# Patient Record
Sex: FEMALE | Race: WHITE | NOT HISPANIC OR LATINO | Employment: FULL TIME | URBAN - METROPOLITAN AREA
[De-identification: names, ages, dates, MRNs, and addresses within clinical notes are randomized per-mention and may not be internally consistent; named-entity substitution may affect disease eponyms.]

---

## 2017-02-06 DIAGNOSIS — I42.0 CARDIOMYOPATHY, DILATED, NONISCHEMIC: ICD-10-CM

## 2017-02-06 DIAGNOSIS — I50.22 CHRONIC SYSTOLIC HEART FAILURE: ICD-10-CM

## 2017-02-06 RX ORDER — CARVEDILOL 6.25 MG/1
TABLET ORAL
Qty: 60 TABLET | Refills: 0 | Status: SHIPPED | OUTPATIENT
Start: 2017-02-06 | End: 2017-03-16 | Stop reason: SDUPTHER

## 2017-03-07 DIAGNOSIS — I42.0 CARDIOMYOPATHY, DILATED, NONISCHEMIC: ICD-10-CM

## 2017-03-07 DIAGNOSIS — I50.22 CHRONIC SYSTOLIC HEART FAILURE: ICD-10-CM

## 2017-03-07 RX ORDER — LISINOPRIL 5 MG/1
TABLET ORAL
Qty: 60 TABLET | Refills: 0 | Status: SHIPPED | OUTPATIENT
Start: 2017-03-07 | End: 2017-03-16 | Stop reason: SDUPTHER

## 2017-03-13 DIAGNOSIS — I50.22 CHRONIC SYSTOLIC HEART FAILURE: ICD-10-CM

## 2017-03-13 DIAGNOSIS — I42.0 CARDIOMYOPATHY, DILATED, NONISCHEMIC: ICD-10-CM

## 2017-03-13 RX ORDER — CARVEDILOL 6.25 MG/1
TABLET ORAL
Qty: 60 TABLET | Refills: 0 | OUTPATIENT
Start: 2017-03-13

## 2017-03-15 ENCOUNTER — TELEPHONE (OUTPATIENT)
Dept: CARDIOLOGY | Facility: CLINIC | Age: 52
End: 2017-03-15

## 2017-03-15 NOTE — TELEPHONE ENCOUNTER
----- Message from Jerzy Del Angel sent at 3/14/2017  4:39 PM CDT -----  Contact: Patient  Pt needs a return call regarding being worked into the schedule for an appt. Pt's states that she has this Thursday off and can come in. Pt can be reached @ ..[phone] (home)  Thank you/ [facility]

## 2017-03-16 ENCOUNTER — OFFICE VISIT (OUTPATIENT)
Dept: CARDIOLOGY | Facility: CLINIC | Age: 52
End: 2017-03-16
Payer: COMMERCIAL

## 2017-03-16 VITALS
HEIGHT: 64 IN | DIASTOLIC BLOOD PRESSURE: 70 MMHG | HEART RATE: 78 BPM | BODY MASS INDEX: 23.45 KG/M2 | SYSTOLIC BLOOD PRESSURE: 136 MMHG | WEIGHT: 137.38 LBS

## 2017-03-16 DIAGNOSIS — I50.22 CHRONIC SYSTOLIC HEART FAILURE: ICD-10-CM

## 2017-03-16 DIAGNOSIS — I50.9 CHF (NYHA CLASS III, ACC/AHA STAGE C): Primary | Chronic | ICD-10-CM

## 2017-03-16 DIAGNOSIS — F17.200 SMOKER: ICD-10-CM

## 2017-03-16 DIAGNOSIS — I42.0 CARDIOMYOPATHY, DILATED, NONISCHEMIC: ICD-10-CM

## 2017-03-16 PROCEDURE — 1160F RVW MEDS BY RX/DR IN RCRD: CPT | Mod: S$GLB,,, | Performed by: NURSE PRACTITIONER

## 2017-03-16 PROCEDURE — 99214 OFFICE O/P EST MOD 30 MIN: CPT | Mod: S$GLB,,, | Performed by: NURSE PRACTITIONER

## 2017-03-16 PROCEDURE — 93000 ELECTROCARDIOGRAM COMPLETE: CPT | Mod: S$GLB,,, | Performed by: INTERNAL MEDICINE

## 2017-03-16 PROCEDURE — 99999 PR PBB SHADOW E&M-EST. PATIENT-LVL III: CPT | Mod: PBBFAC,,, | Performed by: NURSE PRACTITIONER

## 2017-03-16 RX ORDER — CARVEDILOL 12.5 MG/1
12.5 TABLET ORAL 2 TIMES DAILY
Qty: 60 TABLET | Refills: 6 | Status: SHIPPED | OUTPATIENT
Start: 2017-03-16 | End: 2017-12-22 | Stop reason: SDUPTHER

## 2017-03-16 RX ORDER — MELOXICAM 15 MG/1
TABLET ORAL
COMMUNITY
Start: 2017-03-13 | End: 2017-06-06

## 2017-03-16 RX ORDER — FUROSEMIDE 20 MG/1
20 TABLET ORAL DAILY
Qty: 30 TABLET | Refills: 1 | Status: ON HOLD | OUTPATIENT
Start: 2017-03-16 | End: 2020-05-28 | Stop reason: HOSPADM

## 2017-03-16 RX ORDER — POTASSIUM CHLORIDE 20 MEQ/1
20 TABLET, EXTENDED RELEASE ORAL DAILY PRN
Qty: 30 TABLET | Refills: 1 | Status: SHIPPED | OUTPATIENT
Start: 2017-03-16 | End: 2020-05-29

## 2017-03-16 RX ORDER — LISINOPRIL 5 MG/1
TABLET ORAL
Qty: 60 TABLET | Refills: 6 | Status: SHIPPED | OUTPATIENT
Start: 2017-03-16 | End: 2018-02-01 | Stop reason: SDUPTHER

## 2017-03-16 NOTE — PROGRESS NOTES
Subjective:    Patient ID:  Melissa Petty is a 52 y.o. female who presents for follow-up of Congestive Heart Failure      HPI   Ms Petty is a 52 year old female with a PMHx of Systolic Heart Failure, nonischemic cardiomyopathy, and smoker. She visits the clinic today for follow up for CHF. Patient was last seen in cardiology clinic on 9/2/2015. She has been feeling well. Denies chest pain, shortness of breath, palpitations, syncope or dizziness. Denies orthopnea or PND. Vital signs stable today in clinic. 2 D Echo on 9/16/2015 showed low normal to mildly depressed left ventricular systolic function (EF 50-55%), regional wall motion abnormalities noted and left ventricular diastolic dysfunction. Improved from moderately depressed left ventricular function (EF 30%) on 6/1/2013. She reports compliance with low sodium diet and limits her fluid intake.     Review of Systems   Constitution: Negative for diaphoresis, weakness, malaise/fatigue, weight gain and weight loss.   HENT: Negative for congestion and nosebleeds.    Eyes: Negative for blurred vision and double vision.   Cardiovascular: Negative for chest pain, claudication, cyanosis, dyspnea on exertion, irregular heartbeat, leg swelling, near-syncope, orthopnea, palpitations, paroxysmal nocturnal dyspnea and syncope.   Respiratory: Negative for cough, hemoptysis, shortness of breath, sputum production and wheezing.    Hematologic/Lymphatic: Negative for bleeding problem. Does not bruise/bleed easily.   Skin: Negative for rash.   Musculoskeletal: Negative for back pain, falls, joint pain, joint swelling and neck pain.   Gastrointestinal: Negative for abdominal pain, heartburn and vomiting.   Genitourinary: Negative for dysuria, frequency and hematuria.   Neurological: Negative for difficulty with concentration, dizziness, focal weakness, light-headedness, numbness and seizures.   Psychiatric/Behavioral: Negative for depression, memory loss and substance abuse.    Allergic/Immunologic: Negative for HIV exposure and hives.               Past Medical History:   Diagnosis Date    Cardiomyopathy     CHF (congestive heart failure)     Hypertension      No past surgical history on file.  Family History   Problem Relation Age of Onset    Heart failure Mother     Hypertension Brother      Social History     Social History    Marital status: Single     Spouse name: N/A    Number of children: N/A    Years of education: N/A     Social History Main Topics    Smoking status: Current Every Day Smoker     Packs/day: 0.25     Years: 32.00    Smokeless tobacco: Never Used    Alcohol use No    Drug use: No    Sexual activity: Not on file     Other Topics Concern    Not on file     Social History Narrative     Review of patient's allergies indicates:   Allergen Reactions    Pcn [penicillins]      Unknown reaction       Current Outpatient Prescriptions on File Prior to Visit   Medication Sig Dispense Refill    aspirin (ECOTRIN) 81 MG EC tablet Take 81 mg by mouth once daily.      folic acid (FOLVITE) 800 MCG Tab Take 400 mcg by mouth once daily.      multivitamin capsule Take 1 capsule by mouth once daily.      thiamine (VITAMIN B-1) 100 MG tablet Take 250 mg by mouth once daily.      [DISCONTINUED] carvedilol (COREG) 6.25 MG tablet TAKE ONE TABLET BY MOUTH TWICE DAILY 60 tablet 0    [DISCONTINUED] lisinopril (PRINIVIL,ZESTRIL) 5 MG tablet TAKE ONE TABLET BY MOUTH EVERY 12 HOURS 60 tablet 0    [DISCONTINUED] furosemide (LASIX) 20 MG tablet Take 1 tablet (20 mg total) by mouth once daily. PRN 30 tablet 3    [DISCONTINUED] potassium chloride SA (K-DUR,KLOR-CON) 20 MEQ tablet PRN, takes with lasix      [DISCONTINUED] zolpidem (AMBIEN) 5 MG Tab        No current facility-administered medications on file prior to visit.      .  Objective:    Physical Exam   Constitutional: She is oriented to person, place, and time. She appears well-developed and well-nourished.   HENT:  "  Head: Normocephalic and atraumatic.   Eyes: Pupils are equal, round, and reactive to light.   Neck: Normal range of motion. No JVD present.   Cardiovascular: Exam reveals no gallop and no friction rub.    No murmur heard.  Pulmonary/Chest: Effort normal and breath sounds normal. No respiratory distress. She has no wheezes. She has no rales. She exhibits no tenderness.   Abdominal: Soft. Bowel sounds are normal. She exhibits no distension and no mass. There is no tenderness. There is no rebound and no guarding.   Musculoskeletal: She exhibits no edema or deformity.   Neurological: She is alert and oriented to person, place, and time.   Skin: Skin is warm and dry.   Psychiatric: She has a normal mood and affect. Her behavior is normal. Judgment and thought content normal.   Nursing note and vitals reviewed.           Chemistry        Component Value Date/Time     09/02/2015 1253    K 3.9 09/02/2015 1253     09/02/2015 1253    CO2 26 09/02/2015 1253    BUN 17 09/02/2015 1253    CREATININE 0.7 09/02/2015 1253    GLU 81 09/02/2015 1253        Component Value Date/Time    CALCIUM 9.5 09/02/2015 1253    ALKPHOS 80 06/24/2013 1503    AST 25 06/24/2013 1503    ALT 26 06/24/2013 1503    BILITOT 0.5 06/24/2013 1503          Lab Results   Component Value Date    TSH 0.332 (L) 05/31/2013     Lab Results   Component Value Date    INR 1.1 05/31/2013     Lab Results   Component Value Date    WBC 10.18 06/04/2013    HGB 16.9 (H) 06/04/2013    HCT 50.2 (H) 06/04/2013    MCV 99.6 (H) 06/04/2013     06/04/2013     /70 (BP Location: Right arm, Patient Position: Sitting, BP Method: Manual)  Pulse 78 Comment: radial  Ht 5' 4" (1.626 m)  Wt 62.3 kg (137 lb 5.6 oz)  BMI 23.58 kg/m2    Assessment:       1. CHF (NYHA class III, ACC/AHA stage C)    2. Chronic systolic heart failure    3. Cardiomyopathy, dilated, nonischemic    4. Smoker        Patient doing well, no signs of Decompensated Systolic Heart Failure " on exam. Vital signs and EKG stable.   She was instructed on the importance of maintaining a low sodium diet and limiting fluid intake.         Plan:       Will continue current medications and treatment plan  Risk modification   Low sodium diet of 1.5-2 grams daily and limit fluid intake to 1.5-2 liters daily  OMT-Increase Coreg 12.5 mg BID  BP log at home twice daily   Follow up in clinic in 3-4 months

## 2017-03-16 NOTE — MR AVS SNAPSHOT
[facility]  [address] [facility] [place] [address]  Phone: [phone]  Fax: [phone]                  Melissa Petty   3/16/2017 10:30 AM   Office Visit    Description:  Female : 1965   Provider:  Trevor Martinez NP   Department:  [facility]           Reason for Visit     Congestive Heart Failure           Diagnoses this Visit        Comments    CHF (NYHA class III, ACC/AHA stage C)    -  Primary     Chronic systolic heart failure         Cardiomyopathy, dilated, nonischemic         Smoker                To Do List           Goals (5 Years of Data)     None      Follow-Up and Disposition     Return in about 4 months (around 2017).       These Medications        Disp Refills Start End    carvedilol (COREG) 12.5 MG tablet 60 tablet 6 3/16/2017     Take 1 tablet (12.5 mg total) by mouth 2 (two) times daily. - Oral    Pharmacy: [facility] [address] [place] [address] Ph #: [phone]       lisinopril (PRINIVIL,ZESTRIL) 5 MG tablet 60 tablet 6 3/16/2017     TAKE ONE TABLET BY MOUTH EVERY 12 HOURS    Pharmacy: [facility] [address] [place] [address] Ph #: [phone]       furosemide (LASIX) 20 MG tablet 30 tablet 1 3/16/2017     Take 1 tablet (20 mg total) by mouth once daily. PRN - Oral    Pharmacy: [facility] [address] [place] [address] Ph #: [phone]       potassium chloride SA (K-DUR,KLOR-CON) 20 MEQ tablet 30 tablet 1 3/16/2017     Take 1 tablet (20 mEq total) by mouth daily as needed. PRN, takes with lasix - Oral    Pharmacy: [address] Ph #: [phone]         [facility] Call     [facility] On Call Nurse Care Line -  Assistance  Registered nurses in the [facility] provide clinical advisement, health education, appointment booking, and other advisory services.  Call for this free service  "at [phone].             Medications           CHANGE how you are taking these medications     Start Taking Instead of    carvedilol (COREG) 12.5 MG tablet carvedilol (COREG) 6.25 MG tablet    Dosage:  Take 1 tablet (12.5 mg total) by mouth 2 (two) times daily. Dosage:  TAKE ONE TABLET BY MOUTH TWICE DAILY    Reason for Change:  Reorder     potassium chloride SA (K-DUR,KLOR-CON) 20 MEQ tablet potassium chloride SA (K-DUR,KLOR-CON) 20 MEQ tablet    Dosage:  Take 1 tablet (20 mEq total) by mouth daily as needed. PRN, takes with lasix Dosage:  PRN, takes with lasix    Reason for Change:  Reorder       STOP taking these medications     zolpidem (AMBIEN) 5 MG Tab            Verify that the below list of medications is an accurate representation of the medications you are currently taking.  If none reported, the list may be blank. If incorrect, please contact your healthcare provider. Carry this list with you in case of emergency.           Current Medications     aspirin (ECOTRIN) 81 MG EC tablet Take 81 mg by mouth once daily.    carvedilol (COREG) 12.5 MG tablet Take 1 tablet (12.5 mg total) by mouth 2 (two) times daily.    folic acid (FOLVITE) 800 MCG Tab Take 400 mcg by mouth once daily.    lisinopril (PRINIVIL,ZESTRIL) 5 MG tablet TAKE ONE TABLET BY MOUTH EVERY 12 HOURS    meloxicam (MOBIC) 15 MG tablet     multivitamin capsule Take 1 capsule by mouth once daily.    thiamine (VITAMIN B-1) 100 MG tablet Take 250 mg by mouth once daily.    furosemide (LASIX) 20 MG tablet Take 1 tablet (20 mg total) by mouth once daily. PRN    potassium chloride SA (K-DUR,KLOR-CON) 20 MEQ tablet Take 1 tablet (20 mEq total) by mouth daily as needed. PRN, takes with lasix           Clinical Reference Information           Your Vitals Were     BP Pulse Height Weight BMI    136/70 (BP Location: Right arm, Patient Position: Sitting, BP Method: Manual) 78 5' 4" (1.626 m) 62.3 kg (137 lb 5.6 oz) 23.58 kg/m2      Blood Pressure          " Most Recent Value    BP  136/70      Allergies as of 3/16/2017     Pcn [Penicillins]      Immunizations Administered on Date of Encounter - 3/16/2017     None      Orders Placed During Today's Visit     Future Labs/Procedures Expected by Expires    SCHEDULED EKG 12-LEAD (to Muse)  As directed 3/16/2018      [facility] Sign-Up     Activating your [facility] account is as easy as 1-2-3!     1) Visit [email or web address], select Sign Up Now, enter this activation code and your date of birth, then select Next.  [email or web address]  Expires: 4/30/2017 11:01 AM      2) Create a username and password to use when you visit [facility] in the future and select a security question in case you lose your password and select Next.    3) Enter your e-mail address and click Sign Up!    Additional Information  If you have questions, please e-mail [email or web address] or call [phone] to talk to our [facility] staff. Remember, [facility] is NOT to be used for urgent needs. For medical emergencies, dial 911.         Smoking Cessation     If you would like to quit smoking:   You may be eligible for free services if you are a [place] resident and started smoking cigarettes before September 1, 1988.  Call the Smoking Cessation [employer] (SCT) toll free at (919) 023-7568 or (785) 435-4265.   Call 8-109-QUIT-NOW if you do not meet the above criteria.            Language Assistance Services     ATTENTION: Language assistance services are available, free of charge. Please call [phone].      ATENCIÓN: Si habla español, tiene a culp disposición servicios gratuitos de asistencia lingüística. Llame al [phone].     CHÚ Ý: N?u b?n nói Ti?ng Vi?t, có các d?ch v? h? tr? ngôn ng? mi?n phí dành cho b?n. G?i s? [phone].         [facility] complies with applicable Federal civil rights laws and does not discriminate on the basis of race, color, national origin, age, disability, or sex.

## 2017-06-06 ENCOUNTER — HOSPITAL ENCOUNTER (OUTPATIENT)
Dept: RADIOLOGY | Facility: HOSPITAL | Age: 52
Discharge: HOME | End: 2017-06-06
Attending: NURSE PRACTITIONER
Payer: COMMERCIAL

## 2017-06-06 ENCOUNTER — OFFICE VISIT (OUTPATIENT)
Dept: URGENT CARE | Facility: CLINIC | Age: 52
End: 2017-06-06
Payer: COMMERCIAL

## 2017-06-06 VITALS
OXYGEN SATURATION: 97 % | TEMPERATURE: 96 F | HEIGHT: 64 IN | WEIGHT: 131.94 LBS | DIASTOLIC BLOOD PRESSURE: 82 MMHG | BODY MASS INDEX: 22.53 KG/M2 | RESPIRATION RATE: 18 BRPM | HEART RATE: 84 BPM | SYSTOLIC BLOOD PRESSURE: 138 MMHG

## 2017-06-06 DIAGNOSIS — I10 ESSENTIAL HYPERTENSION: ICD-10-CM

## 2017-06-06 DIAGNOSIS — F17.200 TOBACCO USE DISORDER: Primary | ICD-10-CM

## 2017-06-06 DIAGNOSIS — M25.551 PAIN OF RIGHT HIP JOINT: ICD-10-CM

## 2017-06-06 PROCEDURE — 99203 OFFICE O/P NEW LOW 30 MIN: CPT | Mod: 25,S$GLB,, | Performed by: NURSE PRACTITIONER

## 2017-06-06 PROCEDURE — 99999 PR PBB SHADOW E&M-EST. PATIENT-LVL V: CPT | Mod: PBBFAC,,, | Performed by: NURSE PRACTITIONER

## 2017-06-06 PROCEDURE — 96372 THER/PROPH/DIAG INJ SC/IM: CPT | Mod: S$GLB,,, | Performed by: NURSE PRACTITIONER

## 2017-06-06 PROCEDURE — 73502 X-RAY EXAM HIP UNI 2-3 VIEWS: CPT | Mod: TC,PO,RT

## 2017-06-06 PROCEDURE — 73502 X-RAY EXAM HIP UNI 2-3 VIEWS: CPT | Mod: 26,RT,, | Performed by: RADIOLOGY

## 2017-06-06 RX ORDER — ASPIRIN 81 MG/1
81 TABLET ORAL
COMMUNITY
End: 2020-07-02

## 2017-06-06 RX ORDER — MELOXICAM 15 MG/1
15 TABLET ORAL
COMMUNITY
Start: 2017-04-06 | End: 2017-06-06

## 2017-06-06 RX ORDER — NAPROXEN 500 MG/1
500 TABLET ORAL
COMMUNITY
Start: 2017-05-16 | End: 2019-12-18

## 2017-06-06 RX ORDER — KETOROLAC TROMETHAMINE 30 MG/ML
60 INJECTION, SOLUTION INTRAMUSCULAR; INTRAVENOUS ONCE
Status: COMPLETED | OUTPATIENT
Start: 2017-06-06 | End: 2017-06-06

## 2017-06-06 RX ADMIN — KETOROLAC TROMETHAMINE 60 MG: 30 INJECTION, SOLUTION INTRAMUSCULAR; INTRAVENOUS at 04:06

## 2017-06-06 NOTE — PATIENT INSTRUCTIONS
PLAN: X-ray right hip  Consult Rheumatology  Toradol 60 mg im now  Advise increase p.o. fluids--water/juice & rest  Practice good handwashing.  Advise continue medications  Discussed smoking cessation  Tylenol or Ibuprofen for fever, headache and body aches.  Advise warm heat  Advise follow up with PCP  Advise  go to ER if symptoms worsen or fail to improve with treatment.

## 2017-06-06 NOTE — PROGRESS NOTES
CHIEF COMPLAINT/REASON FOR VISIT:  Right hip pain    HISTORY OF PRESENT ILLNESS:  52-year-old female complains of right hip pain on and off 1-2 years ago. Patient denies any specific injury or trauma.  Patient admits seen and treated with a steroid injection 2 months ago per orthopedic with little relief.  Patient requesting x-ray right hip.  Admits Given Naprosyn per orthopedics with little relief.  Patient admits still smoking.  Discuss smoking cessation.  Patient denies chest pain, shortness of breath, congestion, fever, cough, urinary discomfort.       Past Medical History:   Diagnosis Date    Cardiomyopathy     CHF (congestive heart failure)     Hypertension           Social History     Social History    Marital status: Single     Spouse name: N/A    Number of children: N/A    Years of education: N/A     Occupational History    Not on file.     Social History Main Topics    Smoking status: Current Every Day Smoker     Packs/day: 0.25     Years: 32.00    Smokeless tobacco: Never Used    Alcohol use No    Drug use: No    Sexual activity: Not on file     Other Topics Concern    Not on file     Social History Narrative    No narrative on file          Family History   Problem Relation Age of Onset    Heart failure Mother     Hypertension Brother        ROS:  GENERAL: No fever, chills, fatigability or weight loss.  SKIN: No rashes, itching or changes in color or texture of skin.  CHEST: Denies cyanosis, wheezing, cough and sputum production.  CARDIOVASCULAR: Denies chest pain, PND, orthopnea or reduced exercise tolerance.  ABDOMEN: Appetite fine. No weight loss. Denies diarrhea, abdominal pain, hematemesis or blood in stool.  MUSCULOSKELETAL: right hip pain.  NEUROLOGIC: No history of seizures, paralysis, alteration of gait or coordination.  PSYCHIATRIC: Denies mood swings, depression or suicidal thoughts.    PE:   APPEARANCE: Well nourished, well developed, in mild distress.   SKIN: Normal skin  turgor, no rashes or lesions..  CHEST: no respiratory symptoms.  CARDIOVASCULAR: Regular rate and rhythm   MUSCULOSKELETAL: Right hip with limited range of motion due to pain, right lateral femur with minimal tenderness on palpation, bilateral knees with full range of motion, no crepitus  NEUROLOGIC: No sensory deficits. Gait & Posture: Normal gait and fine motion. No cerebellar signs.  MENTAL STATUS: Patient alert, oriented x 3 & conversant.    PLAN: X-ray right hip  Consult Rheumatology  Toradol 60 mg IM now  Advise increase p.o. fluids--water/juice & rest  Practice good handwashing.  Advise continue medications  Discussed smoking cessation  Tylenol or Ibuprofen for fever, headache and body aches.  Advise warm heat  Advise follow up with PCP  Advise  go to ER if symptoms worsen or fail to improve with treatment.      DIAGNOSIS:   Right hip pain  Hypertension   Tobacco use disorder

## 2017-06-07 ENCOUNTER — TELEPHONE (OUTPATIENT)
Dept: URGENT CARE | Facility: CLINIC | Age: 52
End: 2017-06-07

## 2017-06-07 NOTE — TELEPHONE ENCOUNTER
----- Message from Ifeoma Rehman sent at 6/7/2017 10:07 AM CDT -----  Contact: Pt  Pt is requesting to have xray results from 06/06/17. Pt can be reached at [phone].

## 2017-06-07 NOTE — TELEPHONE ENCOUNTER
I have already spoke with patient, and gave her the results of the xray, patient states she will go to her own ortho doctor.

## 2017-07-14 ENCOUNTER — TELEPHONE (OUTPATIENT)
Dept: CARDIOLOGY | Facility: CLINIC | Age: 52
End: 2017-07-14

## 2017-07-14 NOTE — TELEPHONE ENCOUNTER
Called to patient to reschedule follow up appointment with mid-level provider--patient states will call our office back when ready to reschedule

## 2017-09-06 ENCOUNTER — CLINICAL SUPPORT (OUTPATIENT)
Dept: CARDIOLOGY | Facility: CLINIC | Age: 52
End: 2017-09-06
Payer: COMMERCIAL

## 2017-09-06 ENCOUNTER — TELEPHONE (OUTPATIENT)
Dept: CARDIOLOGY | Facility: CLINIC | Age: 52
End: 2017-09-06

## 2017-09-06 ENCOUNTER — TELEPHONE (OUTPATIENT)
Dept: CARDIOLOGY | Facility: HOSPITAL | Age: 52
End: 2017-09-06

## 2017-09-06 ENCOUNTER — OFFICE VISIT (OUTPATIENT)
Dept: CARDIOLOGY | Facility: CLINIC | Age: 52
End: 2017-09-06
Payer: COMMERCIAL

## 2017-09-06 VITALS
SYSTOLIC BLOOD PRESSURE: 120 MMHG | HEIGHT: 63 IN | HEART RATE: 95 BPM | WEIGHT: 132 LBS | BODY MASS INDEX: 23.39 KG/M2 | DIASTOLIC BLOOD PRESSURE: 80 MMHG

## 2017-09-06 DIAGNOSIS — Z01.810 PREOP CARDIOVASCULAR EXAM: Primary | ICD-10-CM

## 2017-09-06 DIAGNOSIS — Z01.810 PREOP CARDIOVASCULAR EXAM: ICD-10-CM

## 2017-09-06 DIAGNOSIS — I10 ESSENTIAL HYPERTENSION: ICD-10-CM

## 2017-09-06 DIAGNOSIS — R94.31 ABNORMAL ECG: ICD-10-CM

## 2017-09-06 DIAGNOSIS — I42.0 CARDIOMYOPATHY, DILATED, NONISCHEMIC: ICD-10-CM

## 2017-09-06 DIAGNOSIS — F17.200 SMOKER: ICD-10-CM

## 2017-09-06 DIAGNOSIS — I50.22 CHRONIC SYSTOLIC HEART FAILURE: ICD-10-CM

## 2017-09-06 LAB
DIASTOLIC DYSFUNCTION: NO
ESTIMATED PA SYSTOLIC PRESSURE: 24.6
RETIRED EF AND QEF - SEE NOTES: 60 (ref 55–65)

## 2017-09-06 PROCEDURE — 93000 ELECTROCARDIOGRAM COMPLETE: CPT | Mod: S$GLB,,, | Performed by: INTERNAL MEDICINE

## 2017-09-06 PROCEDURE — 99999 PR PBB SHADOW E&M-EST. PATIENT-LVL III: CPT | Mod: PBBFAC,,, | Performed by: INTERNAL MEDICINE

## 2017-09-06 PROCEDURE — 3008F BODY MASS INDEX DOCD: CPT | Mod: S$GLB,,, | Performed by: INTERNAL MEDICINE

## 2017-09-06 PROCEDURE — 93306 TTE W/DOPPLER COMPLETE: CPT | Mod: S$GLB,,, | Performed by: INTERNAL MEDICINE

## 2017-09-06 PROCEDURE — 99214 OFFICE O/P EST MOD 30 MIN: CPT | Mod: S$GLB,,, | Performed by: INTERNAL MEDICINE

## 2017-09-06 PROCEDURE — 3074F SYST BP LT 130 MM HG: CPT | Mod: S$GLB,,, | Performed by: INTERNAL MEDICINE

## 2017-09-06 PROCEDURE — 3079F DIAST BP 80-89 MM HG: CPT | Mod: S$GLB,,, | Performed by: INTERNAL MEDICINE

## 2017-09-06 NOTE — PROGRESS NOTES
Subjective:    Patient ID:  eMlissa Petty is a 52 y.o. female who presents for evaluation of Pre-op Exam (Rt hip replacement)      HPI pt presents for preop eval.  First time I have seen pt.   Pt due for right hip replacement Monday at [facility], Dr. Sanjiv Lewis.  She has no unusual dyspnea, some with walking with her hip discomfort due to arthritis.  No pnd/orthopnea.  No unusual leg edema.  No chest pain/discomfort sxs.  ecg today shows NSR, possible LAE, nonspecific T wave abnl.  No new ecg changes compared to prior one.  No syncope.  Last surgery 2009 hip surgery.  She had EF 30% in 2013 and had stress MPI at time and showed no ischemia by report.  F/u echo 2015 showed EF 50%, + wma.      Patient Active Problem List   Diagnosis    Smoker    Chronic systolic heart failure    CHF (NYHA class III, ACC/AHA stage C)    Abnormal ECG    Essential hypertension    Preop cardiovascular exam     Past Medical History:   Diagnosis Date    Cardiomyopathy     CHF (congestive heart failure)     Hypertension        Current Outpatient Prescriptions:     carvedilol (COREG) 12.5 MG tablet, Take 1 tablet (12.5 mg total) by mouth 2 (two) times daily., Disp: 60 tablet, Rfl: 6    folic acid (FOLVITE) 800 MCG Tab, Take 400 mcg by mouth once daily., Disp: , Rfl:     lisinopril (PRINIVIL,ZESTRIL) 5 MG tablet, TAKE ONE TABLET BY MOUTH EVERY 12 HOURS, Disp: 60 tablet, Rfl: 6    multivitamin capsule, Take 1 capsule by mouth once daily., Disp: , Rfl:     thiamine (VITAMIN B-1) 100 MG tablet, Take 250 mg by mouth once daily., Disp: , Rfl:     aspirin (ECOTRIN) 81 MG EC tablet, Take 81 mg by mouth., Disp: , Rfl:     furosemide (LASIX) 20 MG tablet, Take 1 tablet (20 mg total) by mouth once daily. PRN, Disp: 30 tablet, Rfl: 1    naproxen (NAPROSYN) 500 MG tablet, Take 500 mg by mouth., Disp: , Rfl:     potassium chloride SA (K-DUR,KLOR-CON) 20 MEQ tablet, Take 1 tablet (20 mEq total) by mouth daily as needed. PRN,  "takes with lasix, Disp: 30 tablet, Rfl: 1      Review of Systems   Constitution: Negative.   HENT: Negative.    Eyes: Negative.    Cardiovascular: Positive for dyspnea on exertion.   Respiratory: Positive for shortness of breath.    Endocrine: Negative.    Hematologic/Lymphatic: Negative.    Skin: Negative.    Musculoskeletal: Positive for arthritis and joint pain.   Gastrointestinal: Negative.    Genitourinary: Negative.    Neurological: Negative.    Psychiatric/Behavioral: Negative.    Allergic/Immunologic: Negative.        /80 (BP Location: Left arm, Patient Position: Sitting)   Pulse 95   Ht 5' 3" (1.6 m)   Wt 59.9 kg (132 lb)   BMI 23.38 kg/m²     Wt Readings from Last 3 Encounters:   09/06/17 59.9 kg (132 lb)   06/06/17 59.8 kg (131 lb 15.1 oz)   03/16/17 62.3 kg (137 lb 5.6 oz)     Temp Readings from Last 3 Encounters:   06/06/17 96.1 °F (35.6 °C) (Tympanic)     BP Readings from Last 3 Encounters:   09/06/17 120/80   06/06/17 138/82   03/16/17 136/70     Pulse Readings from Last 3 Encounters:   09/06/17 95   06/06/17 84   03/16/17 78          Objective:    Physical Exam   Constitutional: She is oriented to person, place, and time. Vital signs are normal. She appears well-developed and well-nourished. She is active and cooperative. She does not have a sickly appearance. She does not appear ill. No distress.   HENT:   Head: Normocephalic.   Neck: Neck supple. Normal carotid pulses, no hepatojugular reflux and no JVD present. Carotid bruit is not present. No thyromegaly present.   Cardiovascular: Normal rate, regular rhythm, S1 normal, S2 normal, normal heart sounds and normal pulses.  PMI is not displaced.  Exam reveals no gallop and no friction rub.    No murmur heard.  Pulses:       Radial pulses are 2+ on the right side, and 2+ on the left side.   Pulmonary/Chest: Effort normal and breath sounds normal. She has no wheezes. She has no rales.   Abdominal: Soft. Normal appearance, normal aorta and " bowel sounds are normal. She exhibits no pulsatile liver, no abdominal bruit, no ascites and no mass. There is no splenomegaly or hepatomegaly. There is no tenderness.   Musculoskeletal: She exhibits no edema.   Lymphadenopathy:     She has no cervical adenopathy.   Neurological: She is alert and oriented to person, place, and time.   Skin: Skin is warm. She is not diaphoretic.   Psychiatric: She has a normal mood and affect. Her behavior is normal.   Nursing note and vitals reviewed.      I have reviewed all pertinent labs and cardiac studies.      Chemistry        Component Value Date/Time     09/02/2015 1253    K 3.9 09/02/2015 1253     09/02/2015 1253    CO2 26 09/02/2015 1253    BUN 17 09/02/2015 1253    CREATININE 0.7 09/02/2015 1253    GLU 81 09/02/2015 1253        Component Value Date/Time    CALCIUM 9.5 09/02/2015 1253    ALKPHOS 80 06/24/2013 1503    AST 25 06/24/2013 1503    ALT 26 06/24/2013 1503    BILITOT 0.5 06/24/2013 1503    ESTGFRAFRICA >60.0 09/02/2015 1253    EGFRNONAA >60.0 09/02/2015 1253        Lab Results   Component Value Date    WBC 10.18 06/04/2013    HGB 16.9 (H) 06/04/2013    HCT 50.2 (H) 06/04/2013    MCV 99.6 (H) 06/04/2013     06/04/2013     No results found for: LABA1C, HGBA1C  No results found for: CHOL  No results found for: HDL  No results found for: LDLCALC  No results found for: TRIG  No results found for: CHOLHDL        Assessment:       1. Preop cardiovascular exam    2. Smoker    3. Chronic systolic heart failure    4. Cardiomyopathy, dilated, nonischemic    5. Abnormal ECG    6. Essential hypertension         Plan:             STABLE CHRONIC CHF.  NO ACUTE CHF SXS OR SXS TO SUGGEST UNSTABLE CORONARY ISCHEMIA BY HISTORY AND EXAM AND PT HAS STABLE ECG.  WILL REASSESS LVEF PRIOR TO SURGERY SINCE IT HAS BEEN 2 YEARS.  SMOKING CESSATION ADVISED  CONTINUE CURRENT MEDS.  IF ECHO IS STABLE, PT MAY PROCEED WITH ORTHOPEDIC SURGERY AT MODERATE CV RISK.  F/U 6 MONTHS.

## 2017-09-06 NOTE — TELEPHONE ENCOUNTER
----- Message from Mili Conrad sent at 9/6/2017 12:04 PM CDT -----  Contact: pt  The pt states she is having a hip replacement on 9/11 and needs to come in tomorrow for a surgery clearance appt, pt can be reached at [phone]///thxMW

## 2017-09-07 NOTE — TELEPHONE ENCOUNTER
Spoke with patient and gave results of test--informed patient of Dr. Hopkins's clearance--patient states clearance needs to be faxed to Dr. Sanjiv Jacobsen

## 2017-09-07 NOTE — TELEPHONE ENCOUNTER
Please call pt  Echo results are stable. Heart strength reported as normal on report    Fax clearance to her surgeon for clearance asap  May have hip surgery at moderate CV risk    Dr Hopkins

## 2017-11-03 ENCOUNTER — TELEPHONE (OUTPATIENT)
Dept: CARDIOLOGY | Facility: CLINIC | Age: 52
End: 2017-11-03

## 2017-11-03 NOTE — TELEPHONE ENCOUNTER
Pt called to let Dr Hopkins know that she did have her hip replacement surg that he gave clearance for 9/6/17.     She says when she was discharged from the hospital her bp was running low and they told her to not take her carvedilol and lisinopril and to monitor her bp.   She says she knows she takes these for her chf and not her bp and wants to know what Dr Hopkins suggest as far as continuing/restarting her meds.     She has been off of them for almost 3 weeks now  bp today was 145/80's     Please advise.

## 2017-12-18 DIAGNOSIS — I50.22 CHRONIC SYSTOLIC HEART FAILURE: ICD-10-CM

## 2017-12-18 DIAGNOSIS — I42.0 CARDIOMYOPATHY, DILATED, NONISCHEMIC: ICD-10-CM

## 2017-12-18 RX ORDER — CARVEDILOL 12.5 MG/1
TABLET ORAL
Qty: 60 TABLET | Refills: 6 | Status: CANCELLED | OUTPATIENT
Start: 2017-12-18

## 2017-12-22 DIAGNOSIS — I50.22 CHRONIC SYSTOLIC HEART FAILURE: ICD-10-CM

## 2017-12-22 DIAGNOSIS — I42.0 CARDIOMYOPATHY, DILATED, NONISCHEMIC: ICD-10-CM

## 2017-12-22 RX ORDER — CARVEDILOL 12.5 MG/1
12.5 TABLET ORAL 2 TIMES DAILY
Qty: 60 TABLET | Refills: 6 | Status: SHIPPED | OUTPATIENT
Start: 2017-12-22 | End: 2018-11-01 | Stop reason: SDUPTHER

## 2017-12-22 NOTE — TELEPHONE ENCOUNTER
----- Message from Mili Conrad sent at 12/22/2017 11:18 AM CST -----  Contact: pt  The pt request a call concerning a medication refill, pt can be reached at [phone]///thxMW

## 2017-12-22 NOTE — TELEPHONE ENCOUNTER
Approved Medications   carvedilol (COREG) 12.5 MG tablet  Take 1 tablet (12.5 mg total) by mouth 2 (two) times daily.  Disp: 60 tablet Refills: 6    Class: Normal Start: 12/22/2017   For: Chronic systolic heart failure; Cardiomyopathy, dilated, nonischemic  Approved by: Ye Welch MD  To be filled at: [facility] [address] [place] [address] AVENUEPhone: [phone]

## 2018-02-01 DIAGNOSIS — I42.0 CARDIOMYOPATHY, DILATED, NONISCHEMIC: ICD-10-CM

## 2018-02-01 DIAGNOSIS — I50.22 CHRONIC SYSTOLIC HEART FAILURE: ICD-10-CM

## 2018-02-02 RX ORDER — LISINOPRIL 5 MG/1
TABLET ORAL
Qty: 60 TABLET | Refills: 6 | Status: SHIPPED | OUTPATIENT
Start: 2018-02-02 | End: 2018-12-14 | Stop reason: SDUPTHER

## 2018-11-01 ENCOUNTER — OFFICE VISIT (OUTPATIENT)
Dept: FAMILY MEDICINE | Facility: CLINIC | Age: 53
End: 2018-11-01
Payer: COMMERCIAL

## 2018-11-01 VITALS
SYSTOLIC BLOOD PRESSURE: 136 MMHG | OXYGEN SATURATION: 97 % | BODY MASS INDEX: 24.68 KG/M2 | WEIGHT: 139.31 LBS | HEART RATE: 95 BPM | TEMPERATURE: 96 F | DIASTOLIC BLOOD PRESSURE: 86 MMHG | HEIGHT: 63 IN

## 2018-11-01 DIAGNOSIS — I42.0 CARDIOMYOPATHY, DILATED, NONISCHEMIC: ICD-10-CM

## 2018-11-01 DIAGNOSIS — J20.9 ACUTE BRONCHITIS, UNSPECIFIED ORGANISM: Primary | ICD-10-CM

## 2018-11-01 DIAGNOSIS — I50.22 CHRONIC SYSTOLIC HEART FAILURE: ICD-10-CM

## 2018-11-01 DIAGNOSIS — F17.200 SMOKER: ICD-10-CM

## 2018-11-01 DIAGNOSIS — I10 ESSENTIAL HYPERTENSION: ICD-10-CM

## 2018-11-01 PROCEDURE — 96372 THER/PROPH/DIAG INJ SC/IM: CPT | Mod: S$GLB,,, | Performed by: FAMILY MEDICINE

## 2018-11-01 PROCEDURE — 3008F BODY MASS INDEX DOCD: CPT | Mod: CPTII,S$GLB,, | Performed by: FAMILY MEDICINE

## 2018-11-01 PROCEDURE — 3075F SYST BP GE 130 - 139MM HG: CPT | Mod: CPTII,S$GLB,, | Performed by: FAMILY MEDICINE

## 2018-11-01 PROCEDURE — 3079F DIAST BP 80-89 MM HG: CPT | Mod: CPTII,S$GLB,, | Performed by: FAMILY MEDICINE

## 2018-11-01 PROCEDURE — 99214 OFFICE O/P EST MOD 30 MIN: CPT | Mod: 25,S$GLB,, | Performed by: FAMILY MEDICINE

## 2018-11-01 PROCEDURE — 99999 PR PBB SHADOW E&M-EST. PATIENT-LVL III: CPT | Mod: PBBFAC,,, | Performed by: FAMILY MEDICINE

## 2018-11-01 RX ORDER — CARVEDILOL 12.5 MG/1
TABLET ORAL
Qty: 60 TABLET | Refills: 6 | Status: SHIPPED | OUTPATIENT
Start: 2018-11-01 | End: 2019-09-25 | Stop reason: SDUPTHER

## 2018-11-01 RX ORDER — PROMETHAZINE HYDROCHLORIDE AND DEXTROMETHORPHAN HYDROBROMIDE 6.25; 15 MG/5ML; MG/5ML
5 SYRUP ORAL 3 TIMES DAILY PRN
Qty: 118 ML | Refills: 0 | Status: SHIPPED | OUTPATIENT
Start: 2018-11-01 | End: 2018-11-11

## 2018-11-01 RX ORDER — PREDNISONE 20 MG/1
TABLET ORAL
Qty: 10 TABLET | Refills: 0 | Status: SHIPPED | OUTPATIENT
Start: 2018-11-01 | End: 2019-12-18

## 2018-11-01 RX ORDER — BETAMETHASONE SODIUM PHOSPHATE AND BETAMETHASONE ACETATE 3; 3 MG/ML; MG/ML
6 INJECTION, SUSPENSION INTRA-ARTICULAR; INTRALESIONAL; INTRAMUSCULAR; SOFT TISSUE
Status: COMPLETED | OUTPATIENT
Start: 2018-11-01 | End: 2018-11-01

## 2018-11-01 RX ADMIN — BETAMETHASONE SODIUM PHOSPHATE AND BETAMETHASONE ACETATE 6 MG: 3; 3 INJECTION, SUSPENSION INTRA-ARTICULAR; INTRALESIONAL; INTRAMUSCULAR; SOFT TISSUE at 03:11

## 2018-11-01 NOTE — PROGRESS NOTES
Subjective:       Patient ID: Melissa Petty is a 53 y.o. female.    Chief Complaint: Cough      HPI Comments:       Current Outpatient Medications:     aspirin (ECOTRIN) 81 MG EC tablet, Take 81 mg by mouth., Disp: , Rfl:     carvedilol (COREG) 12.5 MG tablet, TAKE ONE TABLET BY MOUTH TWICE DAILY, Disp: 60 tablet, Rfl: 6    folic acid (FOLVITE) 800 MCG Tab, Take 400 mcg by mouth once daily., Disp: , Rfl:     furosemide (LASIX) 20 MG tablet, Take 1 tablet (20 mg total) by mouth once daily. PRN, Disp: 30 tablet, Rfl: 1    lisinopril (PRINIVIL,ZESTRIL) 5 MG tablet, TAKE ONE TABLET BY MOUTH EVERY 12 HOURS, Disp: 60 tablet, Rfl: 6    multivitamin capsule, Take 1 capsule by mouth once daily., Disp: , Rfl:     naproxen (NAPROSYN) 500 MG tablet, Take 500 mg by mouth., Disp: , Rfl:     potassium chloride SA (K-DUR,KLOR-CON) 20 MEQ tablet, Take 1 tablet (20 mEq total) by mouth daily as needed. PRN, takes with lasix, Disp: 30 tablet, Rfl: 1    thiamine (VITAMIN B-1) 100 MG tablet, Take 250 mg by mouth once daily., Disp: , Rfl:     predniSONE (DELTASONE) 20 MG tablet, Take 2 tabs by mouth for 5 days, Disp: 10 tablet, Rfl: 0    promethazine-dextromethorphan (PROMETHAZINE-DM) 6.25-15 mg/5 mL Syrp, Take 5 mLs by mouth 3 (three) times daily as needed., Disp: 118 mL, Rfl: 0  No current facility-administered medications for this visit.       This is my 1st time seeing this patient.  She is a smoker and has history of hypertension.    Neck presents with a one-week history of postnasal drip, coughing with clear sputum.  Fatigue.  Some sweats and chills.  Sputum is clear.  No GI symptoms.  Taking Zyrtec, Benadryl, Robitussin DM.  History of bronchitis.  No history of pneumonia or hospitalization.  Does get wheezing when she has a respiratory tract infection.  Never used an inhaler.          Review of Systems   Constitutional: Positive for chills, diaphoresis and fatigue. Negative for activity change, appetite change and  "fever.   HENT: Positive for congestion, postnasal drip and rhinorrhea. Negative for sore throat.    Respiratory: Positive for cough and wheezing. Negative for shortness of breath.    Cardiovascular: Negative for chest pain.   Gastrointestinal: Negative for abdominal pain, diarrhea and nausea.   Genitourinary: Negative for difficulty urinating.   Musculoskeletal: Negative for arthralgias and myalgias.   Neurological: Negative for dizziness and headaches.   Psychiatric/Behavioral: Negative for confusion.       Objective:      Vitals:    11/01/18 1454 11/01/18 1509   BP: (!) 160/100 136/86   Pulse: 95    Temp: 96 °F (35.6 °C)    TempSrc: Tympanic    SpO2: 97%    Weight: 63.2 kg (139 lb 5.3 oz)    Height: 5' 3" (1.6 m)    PainSc: 0-No pain      Physical Exam   Constitutional: She is oriented to person, place, and time. She appears well-developed and well-nourished.  Non-toxic appearance. She appears ill. No distress.   HENT:   Head: Normocephalic.   Right Ear: Tympanic membrane, external ear and ear canal normal.   Left Ear: Tympanic membrane, external ear and ear canal normal.   Nose: Mucosal edema and rhinorrhea present.   Mouth/Throat: Mucous membranes are normal. Posterior oropharyngeal edema present. No oropharyngeal exudate or posterior oropharyngeal erythema.   Neck: Neck supple. No thyromegaly present.   Cardiovascular: Normal rate, regular rhythm and normal heart sounds.   No murmur heard.  Pulmonary/Chest: Effort normal and breath sounds normal. She has no wheezes. She has no rales.   Abdominal: Soft. She exhibits no distension.   Musculoskeletal: She exhibits no edema.   Lymphadenopathy:     She has no cervical adenopathy.   Neurological: She is alert and oriented to person, place, and time.   Skin: Skin is warm and dry. She is not diaphoretic.   Psychiatric: She has a normal mood and affect. Her behavior is normal. Judgment and thought content normal.   Nursing note and vitals reviewed.      Assessment:     "   1. Acute bronchitis, unspecified organism    2. Smoker    3. Essential hypertension        Plan:   Acute bronchitis, unspecified organism  Comments:  [facility] IM, followed by 5 days of prednisone.  Continue Mucinex DM.  Promethazine DM for nighttime cough.  Fluids and rest  Orders:  -     betamethasone acetate-betamethasone sodium phosphate injection 6 mg    Smoker  Comments:  Not interested in cessation at this time.  Has been cutting back lately    Essential hypertension  Comments:  Appears to be controlled. managed by her cardiologist.  She has no PCP.  Only goes to the doctor when she is sick    Other orders  -     promethazine-dextromethorphan (PROMETHAZINE-DM) 6.25-15 mg/5 mL Syrp; Take 5 mLs by mouth 3 (three) times daily as needed.  Dispense: 118 mL; Refill: 0  -     predniSONE (DELTASONE) 20 MG tablet; Take 2 tabs by mouth for 5 days  Dispense: 10 tablet; Refill: 0

## 2018-12-11 DIAGNOSIS — I50.22 CHRONIC SYSTOLIC HEART FAILURE: ICD-10-CM

## 2018-12-11 DIAGNOSIS — I42.0 CARDIOMYOPATHY, DILATED, NONISCHEMIC: ICD-10-CM

## 2018-12-14 DIAGNOSIS — I42.0 CARDIOMYOPATHY, DILATED, NONISCHEMIC: ICD-10-CM

## 2018-12-14 DIAGNOSIS — I50.22 CHRONIC SYSTOLIC HEART FAILURE: ICD-10-CM

## 2018-12-14 RX ORDER — LISINOPRIL 5 MG/1
TABLET ORAL
Qty: 60 TABLET | Refills: 6 | OUTPATIENT
Start: 2018-12-14

## 2018-12-14 RX ORDER — LISINOPRIL 5 MG/1
5 TABLET ORAL EVERY 12 HOURS
Qty: 60 TABLET | Refills: 2 | Status: SHIPPED | OUTPATIENT
Start: 2018-12-14 | End: 2019-05-07 | Stop reason: SDUPTHER

## 2019-05-07 DIAGNOSIS — I50.22 CHRONIC SYSTOLIC HEART FAILURE: ICD-10-CM

## 2019-05-07 DIAGNOSIS — I42.0 CARDIOMYOPATHY, DILATED, NONISCHEMIC: ICD-10-CM

## 2019-05-07 RX ORDER — LISINOPRIL 5 MG/1
TABLET ORAL
Qty: 60 TABLET | Refills: 2 | Status: SHIPPED | OUTPATIENT
Start: 2019-05-07 | End: 2019-09-26 | Stop reason: SDUPTHER

## 2019-09-25 DIAGNOSIS — I50.22 CHRONIC SYSTOLIC HEART FAILURE: ICD-10-CM

## 2019-09-25 DIAGNOSIS — I42.0 CARDIOMYOPATHY, DILATED, NONISCHEMIC: ICD-10-CM

## 2019-09-25 RX ORDER — LISINOPRIL 5 MG/1
5 TABLET ORAL EVERY 12 HOURS
Qty: 60 TABLET | Refills: 2 | OUTPATIENT
Start: 2019-09-25

## 2019-09-25 RX ORDER — CARVEDILOL 12.5 MG/1
TABLET ORAL
Qty: 60 TABLET | Refills: 6 | Status: SHIPPED | OUTPATIENT
Start: 2019-09-25 | End: 2019-09-26 | Stop reason: SDUPTHER

## 2019-09-25 RX ORDER — LISINOPRIL 5 MG/1
TABLET ORAL
Qty: 60 TABLET | Refills: 2 | OUTPATIENT
Start: 2019-09-25

## 2019-09-26 ENCOUNTER — CLINICAL SUPPORT (OUTPATIENT)
Dept: CARDIOLOGY | Facility: CLINIC | Age: 54
End: 2019-09-26
Payer: COMMERCIAL

## 2019-09-26 ENCOUNTER — LAB VISIT (OUTPATIENT)
Dept: LAB | Facility: HOSPITAL | Age: 54
End: 2019-09-26
Attending: PHYSICIAN ASSISTANT
Payer: COMMERCIAL

## 2019-09-26 ENCOUNTER — OFFICE VISIT (OUTPATIENT)
Dept: CARDIOLOGY | Facility: CLINIC | Age: 54
End: 2019-09-26
Payer: COMMERCIAL

## 2019-09-26 VITALS
DIASTOLIC BLOOD PRESSURE: 82 MMHG | HEART RATE: 96 BPM | BODY MASS INDEX: 24.76 KG/M2 | RESPIRATION RATE: 16 BRPM | SYSTOLIC BLOOD PRESSURE: 134 MMHG | HEIGHT: 63 IN | WEIGHT: 139.75 LBS

## 2019-09-26 DIAGNOSIS — F17.200 SMOKER: ICD-10-CM

## 2019-09-26 DIAGNOSIS — I49.3 FREQUENT PVCS: Primary | ICD-10-CM

## 2019-09-26 DIAGNOSIS — I50.22 CHRONIC SYSTOLIC HEART FAILURE: ICD-10-CM

## 2019-09-26 DIAGNOSIS — I42.0 CARDIOMYOPATHY, DILATED, NONISCHEMIC: ICD-10-CM

## 2019-09-26 DIAGNOSIS — I49.3 FREQUENT PVCS: ICD-10-CM

## 2019-09-26 DIAGNOSIS — R94.31 NONSPECIFIC ABNORMAL ELECTROCARDIOGRAM (ECG) (EKG): ICD-10-CM

## 2019-09-26 LAB
ALBUMIN SERPL BCP-MCNC: 3.8 G/DL (ref 3.5–5.2)
ALP SERPL-CCNC: 88 U/L (ref 55–135)
ALT SERPL W/O P-5'-P-CCNC: 13 U/L (ref 10–44)
ANION GAP SERPL CALC-SCNC: 9 MMOL/L (ref 8–16)
AST SERPL-CCNC: 15 U/L (ref 10–40)
BILIRUB SERPL-MCNC: 0.7 MG/DL (ref 0.1–1)
BUN SERPL-MCNC: 11 MG/DL (ref 6–20)
CALCIUM SERPL-MCNC: 9.4 MG/DL (ref 8.7–10.5)
CHLORIDE SERPL-SCNC: 107 MMOL/L (ref 95–110)
CO2 SERPL-SCNC: 27 MMOL/L (ref 23–29)
CREAT SERPL-MCNC: 0.7 MG/DL (ref 0.5–1.4)
EST. GFR  (AFRICAN AMERICAN): >60 ML/MIN/1.73 M^2
EST. GFR  (NON AFRICAN AMERICAN): >60 ML/MIN/1.73 M^2
GLUCOSE SERPL-MCNC: 91 MG/DL (ref 70–110)
MAGNESIUM SERPL-MCNC: 1.8 MG/DL (ref 1.6–2.6)
POTASSIUM SERPL-SCNC: 4.2 MMOL/L (ref 3.5–5.1)
PROT SERPL-MCNC: 6.7 G/DL (ref 6–8.4)
SODIUM SERPL-SCNC: 143 MMOL/L (ref 136–145)

## 2019-09-26 PROCEDURE — 80053 COMPREHEN METABOLIC PANEL: CPT

## 2019-09-26 PROCEDURE — 36415 COLL VENOUS BLD VENIPUNCTURE: CPT

## 2019-09-26 PROCEDURE — 3008F BODY MASS INDEX DOCD: CPT | Mod: CPTII,S$GLB,, | Performed by: PHYSICIAN ASSISTANT

## 2019-09-26 PROCEDURE — 83735 ASSAY OF MAGNESIUM: CPT

## 2019-09-26 PROCEDURE — 3008F PR BODY MASS INDEX (BMI) DOCUMENTED: ICD-10-PCS | Mod: CPTII,S$GLB,, | Performed by: PHYSICIAN ASSISTANT

## 2019-09-26 PROCEDURE — 3079F PR MOST RECENT DIASTOLIC BLOOD PRESSURE 80-89 MM HG: ICD-10-PCS | Mod: CPTII,S$GLB,, | Performed by: PHYSICIAN ASSISTANT

## 2019-09-26 PROCEDURE — 99214 PR OFFICE/OUTPT VISIT, EST, LEVL IV, 30-39 MIN: ICD-10-PCS | Mod: S$GLB,,, | Performed by: PHYSICIAN ASSISTANT

## 2019-09-26 PROCEDURE — 93000 ELECTROCARDIOGRAM COMPLETE: CPT | Mod: S$GLB,,, | Performed by: PHYSICIAN ASSISTANT

## 2019-09-26 PROCEDURE — 99999 PR PBB SHADOW E&M-EST. PATIENT-LVL III: ICD-10-PCS | Mod: PBBFAC,,, | Performed by: PHYSICIAN ASSISTANT

## 2019-09-26 PROCEDURE — 93000 EKG 12-LEAD: ICD-10-PCS | Mod: S$GLB,,, | Performed by: PHYSICIAN ASSISTANT

## 2019-09-26 PROCEDURE — 99214 OFFICE O/P EST MOD 30 MIN: CPT | Mod: S$GLB,,, | Performed by: PHYSICIAN ASSISTANT

## 2019-09-26 PROCEDURE — 99999 PR PBB SHADOW E&M-EST. PATIENT-LVL III: CPT | Mod: PBBFAC,,, | Performed by: PHYSICIAN ASSISTANT

## 2019-09-26 PROCEDURE — 3075F SYST BP GE 130 - 139MM HG: CPT | Mod: CPTII,S$GLB,, | Performed by: PHYSICIAN ASSISTANT

## 2019-09-26 PROCEDURE — 3079F DIAST BP 80-89 MM HG: CPT | Mod: CPTII,S$GLB,, | Performed by: PHYSICIAN ASSISTANT

## 2019-09-26 PROCEDURE — 3075F PR MOST RECENT SYSTOLIC BLOOD PRESS GE 130-139MM HG: ICD-10-PCS | Mod: CPTII,S$GLB,, | Performed by: PHYSICIAN ASSISTANT

## 2019-09-26 RX ORDER — LISINOPRIL 5 MG/1
5 TABLET ORAL EVERY 12 HOURS
Qty: 180 TABLET | Refills: 3 | Status: SHIPPED | OUTPATIENT
Start: 2019-09-26 | End: 2019-11-13 | Stop reason: ALTCHOICE

## 2019-09-26 RX ORDER — CARVEDILOL 12.5 MG/1
12.5 TABLET ORAL 2 TIMES DAILY
Qty: 180 TABLET | Refills: 3 | Status: SHIPPED | OUTPATIENT
Start: 2019-09-26 | End: 2019-09-26 | Stop reason: DRUGHIGH

## 2019-09-26 RX ORDER — CARVEDILOL 25 MG/1
25 TABLET ORAL 2 TIMES DAILY WITH MEALS
Qty: 180 TABLET | Refills: 3 | Status: SHIPPED | OUTPATIENT
Start: 2019-09-26 | End: 2020-11-02 | Stop reason: SDUPTHER

## 2019-09-26 NOTE — TELEPHONE ENCOUNTER
Please phone patient.CMP reviewed and is stable. Mg WNL.    Proceed with echo and stress test as we discussed    Thanks

## 2019-10-11 ENCOUNTER — HOSPITAL ENCOUNTER (OUTPATIENT)
Dept: PULMONOLOGY | Facility: HOSPITAL | Age: 54
Discharge: HOME | End: 2019-10-11
Attending: PHYSICIAN ASSISTANT
Payer: COMMERCIAL

## 2019-10-11 ENCOUNTER — HOSPITAL ENCOUNTER (OUTPATIENT)
Dept: CARDIOLOGY | Facility: HOSPITAL | Age: 54
Discharge: HOME | End: 2019-10-11
Attending: PHYSICIAN ASSISTANT
Payer: COMMERCIAL

## 2019-10-11 ENCOUNTER — HOSPITAL ENCOUNTER (OUTPATIENT)
Dept: RADIOLOGY | Facility: HOSPITAL | Age: 54
Discharge: HOME | End: 2019-10-11
Attending: PHYSICIAN ASSISTANT
Payer: COMMERCIAL

## 2019-10-11 DIAGNOSIS — F17.200 SMOKER: ICD-10-CM

## 2019-10-11 DIAGNOSIS — I42.0 CARDIOMYOPATHY, DILATED, NONISCHEMIC: ICD-10-CM

## 2019-10-11 DIAGNOSIS — I50.22 CHRONIC SYSTOLIC HEART FAILURE: ICD-10-CM

## 2019-10-11 DIAGNOSIS — R94.31 NONSPECIFIC ABNORMAL ELECTROCARDIOGRAM (ECG) (EKG): ICD-10-CM

## 2019-10-11 DIAGNOSIS — I49.3 FREQUENT PVCS: ICD-10-CM

## 2019-10-11 LAB
AORTIC VALVE REGURGITATION: ABNORMAL
DIASTOLIC DYSFUNCTION: NO
DIASTOLIC DYSFUNCTION: YES
ESTIMATED PA SYSTOLIC PRESSURE: 16.1
MITRAL VALVE REGURGITATION: ABNORMAL
RETIRED EF AND QEF - SEE NOTES: 25 (ref 55–65)
TRICUSPID VALVE REGURGITATION: ABNORMAL

## 2019-10-11 PROCEDURE — 93018 NM MULTI PHARM STRESS CARDIAC COMPONENT: ICD-10-PCS | Mod: ,,, | Performed by: INTERNAL MEDICINE

## 2019-10-11 PROCEDURE — 93016 NM MULTI PHARM STRESS CARDIAC COMPONENT: ICD-10-PCS | Mod: ,,, | Performed by: INTERNAL MEDICINE

## 2019-10-11 PROCEDURE — 78452 HT MUSCLE IMAGE SPECT MULT: CPT | Mod: 26,,, | Performed by: INTERNAL MEDICINE

## 2019-10-11 PROCEDURE — 93306 2D ECHO WITH COLOR FLOW DOPPLER: ICD-10-PCS | Mod: 26,,, | Performed by: INTERNAL MEDICINE

## 2019-10-11 PROCEDURE — 63600175 PHARM REV CODE 636 W HCPCS: Performed by: PHYSICIAN ASSISTANT

## 2019-10-11 PROCEDURE — 93306 TTE W/DOPPLER COMPLETE: CPT

## 2019-10-11 PROCEDURE — 93016 CV STRESS TEST SUPVJ ONLY: CPT | Mod: ,,, | Performed by: INTERNAL MEDICINE

## 2019-10-11 PROCEDURE — 78452 HT MUSCLE IMAGE SPECT MULT: CPT | Mod: TC

## 2019-10-11 PROCEDURE — 93018 CV STRESS TEST I&R ONLY: CPT | Mod: ,,, | Performed by: INTERNAL MEDICINE

## 2019-10-11 PROCEDURE — 93017 CV STRESS TEST TRACING ONLY: CPT

## 2019-10-11 PROCEDURE — 93306 TTE W/DOPPLER COMPLETE: CPT | Mod: 26,,, | Performed by: INTERNAL MEDICINE

## 2019-10-11 PROCEDURE — 78452 NM MULTI PHARM STRESS CARDIAC COMPONENT: ICD-10-PCS | Mod: 26,,, | Performed by: INTERNAL MEDICINE

## 2019-10-11 RX ORDER — REGADENOSON 0.08 MG/ML
0.4 INJECTION, SOLUTION INTRAVENOUS ONCE
Status: COMPLETED | OUTPATIENT
Start: 2019-10-11 | End: 2019-10-11

## 2019-10-11 RX ADMIN — REGADENOSON 0.4 MG: 0.08 INJECTION, SOLUTION INTRAVENOUS at 02:10

## 2019-10-15 ENCOUNTER — TELEPHONE (OUTPATIENT)
Dept: CARDIOLOGY | Facility: CLINIC | Age: 54
End: 2019-10-15

## 2019-10-15 NOTE — TELEPHONE ENCOUNTER
Patient contacted and was advised about her Echo results; patient was reminded about appointment on 10/17/2019 at 1:15pm to discuss more details on a plan.  MPI stress test negative for ischemia/injury. 2D echo showed depressed EF of 25%. Explained findings, recommended LifeVest. Patient stated understanding with no questions or concerns.

## 2019-10-17 ENCOUNTER — OFFICE VISIT (OUTPATIENT)
Dept: CARDIOLOGY | Facility: CLINIC | Age: 54
End: 2019-10-17
Payer: COMMERCIAL

## 2019-10-17 ENCOUNTER — CLINICAL SUPPORT (OUTPATIENT)
Dept: CARDIOLOGY | Facility: CLINIC | Age: 54
End: 2019-10-17
Payer: COMMERCIAL

## 2019-10-17 VITALS
HEIGHT: 63 IN | WEIGHT: 139.56 LBS | BODY MASS INDEX: 24.73 KG/M2 | SYSTOLIC BLOOD PRESSURE: 160 MMHG | DIASTOLIC BLOOD PRESSURE: 96 MMHG | HEART RATE: 77 BPM

## 2019-10-17 DIAGNOSIS — I42.0 CARDIOMYOPATHY, DILATED, NONISCHEMIC: ICD-10-CM

## 2019-10-17 DIAGNOSIS — I50.22 CHRONIC SYSTOLIC HEART FAILURE: ICD-10-CM

## 2019-10-17 DIAGNOSIS — I50.9 CHF (NYHA CLASS III, ACC/AHA STAGE C): Chronic | ICD-10-CM

## 2019-10-17 DIAGNOSIS — R94.31 ABNORMAL ECG: Primary | ICD-10-CM

## 2019-10-17 DIAGNOSIS — F17.200 SMOKER: ICD-10-CM

## 2019-10-17 DIAGNOSIS — I10 ESSENTIAL HYPERTENSION: ICD-10-CM

## 2019-10-17 DIAGNOSIS — I49.3 PVC (PREMATURE VENTRICULAR CONTRACTION): ICD-10-CM

## 2019-10-17 PROCEDURE — 99214 PR OFFICE/OUTPT VISIT, EST, LEVL IV, 30-39 MIN: ICD-10-PCS | Mod: S$GLB,,, | Performed by: PHYSICIAN ASSISTANT

## 2019-10-17 PROCEDURE — 99214 OFFICE O/P EST MOD 30 MIN: CPT | Mod: S$GLB,,, | Performed by: PHYSICIAN ASSISTANT

## 2019-10-17 PROCEDURE — 93000 EKG 12-LEAD: ICD-10-PCS | Mod: S$GLB,,, | Performed by: PHYSICIAN ASSISTANT

## 2019-10-17 PROCEDURE — 3077F SYST BP >= 140 MM HG: CPT | Mod: CPTII,S$GLB,, | Performed by: PHYSICIAN ASSISTANT

## 2019-10-17 PROCEDURE — 3080F DIAST BP >= 90 MM HG: CPT | Mod: CPTII,S$GLB,, | Performed by: PHYSICIAN ASSISTANT

## 2019-10-17 PROCEDURE — 3077F PR MOST RECENT SYSTOLIC BLOOD PRESSURE >= 140 MM HG: ICD-10-PCS | Mod: CPTII,S$GLB,, | Performed by: PHYSICIAN ASSISTANT

## 2019-10-17 PROCEDURE — 3080F PR MOST RECENT DIASTOLIC BLOOD PRESSURE >= 90 MM HG: ICD-10-PCS | Mod: CPTII,S$GLB,, | Performed by: PHYSICIAN ASSISTANT

## 2019-10-17 PROCEDURE — 93000 ELECTROCARDIOGRAM COMPLETE: CPT | Mod: S$GLB,,, | Performed by: PHYSICIAN ASSISTANT

## 2019-10-17 PROCEDURE — 3008F PR BODY MASS INDEX (BMI) DOCUMENTED: ICD-10-PCS | Mod: CPTII,S$GLB,, | Performed by: PHYSICIAN ASSISTANT

## 2019-10-17 PROCEDURE — 99999 PR PBB SHADOW E&M-EST. PATIENT-LVL IV: ICD-10-PCS | Mod: PBBFAC,,, | Performed by: PHYSICIAN ASSISTANT

## 2019-10-17 PROCEDURE — 99999 PR PBB SHADOW E&M-EST. PATIENT-LVL IV: CPT | Mod: PBBFAC,,, | Performed by: PHYSICIAN ASSISTANT

## 2019-10-17 PROCEDURE — 3008F BODY MASS INDEX DOCD: CPT | Mod: CPTII,S$GLB,, | Performed by: PHYSICIAN ASSISTANT

## 2019-10-17 NOTE — PROGRESS NOTES
Subjective:    Patient ID:  Melissa Petty is a 54 y.o. female who presents for follow-up of Congestive Heart Failure and Hypertension      HPI  Ms. Petty is a 54 year old female patient whose current medical conditions include chronic systolic CHF/dilated NICM, tobacco abuse, and abnormal EKG who presents today for follow-up. Patient previously seen by me and had MPI stress test and 2D echo ordered for further evaluation. She returns today and states she is doing well. No real cardiac complaints. No chest pain or anginal equivalent. No SOB. No PND, orthopnea, LE edema, abdominal bloating. No lightheadedness, dizziness, near syncope, or syncope. Occasional palpitations, non-bothersome. BP elevated today however patient was stressed/nervous. EKG today reviewed showed SR with occasional PVC's, QTc improved. 2D echo showed EF of 25-30%. MPI stress test negative for ischemia/injury.     Review of Systems   Constitution: Negative for chills, decreased appetite, fever and malaise/fatigue.   HENT: Negative for congestion, hoarse voice and sore throat.    Eyes: Negative for blurred vision and discharge.   Cardiovascular: Positive for palpitations. Negative for chest pain, claudication, cyanosis, dyspnea on exertion, irregular heartbeat, leg swelling, near-syncope, orthopnea and paroxysmal nocturnal dyspnea.   Respiratory: Negative for cough, hemoptysis, shortness of breath, snoring, sputum production and wheezing.    Endocrine: Negative for cold intolerance and heat intolerance.   Hematologic/Lymphatic: Negative for bleeding problem. Does not bruise/bleed easily.   Skin: Negative for rash.   Musculoskeletal: Positive for arthritis and joint pain. Negative for back pain, joint swelling, muscle cramps, muscle weakness and myalgias.   Gastrointestinal: Negative for abdominal pain, constipation, diarrhea, heartburn, melena and nausea.   Genitourinary: Negative for hematuria.   Neurological: Negative for dizziness, focal  "weakness, headaches, light-headedness, loss of balance, numbness, paresthesias, seizures and weakness.   Psychiatric/Behavioral: Negative for memory loss. The patient does not have insomnia.    Allergic/Immunologic: Negative for hives.     BP (!) 160/96 (BP Location: Left arm)   Pulse 77   Ht 5' 3" (1.6 m)   Wt 63.3 kg (139 lb 8.8 oz)   BMI 24.72 kg/m²     Objective:    Physical Exam   Constitutional: She is oriented to person, place, and time. She appears well-developed and well-nourished. No distress.   HENT:   Head: Normocephalic and atraumatic.   Eyes: Pupils are equal, round, and reactive to light. Right eye exhibits no discharge. Left eye exhibits no discharge.   Neck: Neck supple. No JVD present.   Cardiovascular: Normal rate, regular rhythm, S1 normal, S2 normal, normal heart sounds and intact distal pulses.  Occasional extrasystoles are present.   No murmur heard.  Pulmonary/Chest: Effort normal and breath sounds normal. No respiratory distress. She has no wheezes. She has no rales.   Abdominal: Soft. She exhibits no distension.   Musculoskeletal: She exhibits no edema.   Neurological: She is alert and oriented to person, place, and time.   Skin: Skin is warm and dry. She is not diaphoretic. No erythema.   Psychiatric: She has a normal mood and affect. Her behavior is normal. Thought content normal.   Nursing note and vitals reviewed.    Impression: NORMAL MYOCARDIAL PERFUSION  1. The perfusion scan is free of evidence for myocardial ischemia or injury.   2. There is a mild intensity fixed defect in the anteroapical wall of the left ventricle, likely secondary to soft tissue attenuation.   3. Resting wall motion is physiologic.   4. There is resting LV dysfunction with a reduced ejection fraction of 28 %.   5. The left ventricular volume is mildly increased at rest.   6. The extracardiac distribution of radioactivity is normal.     2D Echo CONCLUSIONS     1 - Severely depressed left ventricular systolic " function (EF 25-30%).     2 - Impaired LV relaxation, normal LAP (grade 1 diastolic dysfunction).     3 - Normal right ventricular systolic function .     4 - Mild to moderate mitral regurgitation.     5 - Mild aortic regurgitation.     6 - Mild left atrial enlargement.     7 - Concentric hypertrophy.       Chemistry        Component Value Date/Time     09/26/2019 1030    K 4.2 09/26/2019 1030     09/26/2019 1030    CO2 27 09/26/2019 1030    BUN 11 09/26/2019 1030    CREATININE 0.7 09/26/2019 1030    GLU 91 09/26/2019 1030        Component Value Date/Time    CALCIUM 9.4 09/26/2019 1030    ALKPHOS 88 09/26/2019 1030    AST 15 09/26/2019 1030    ALT 13 09/26/2019 1030    BILITOT 0.7 09/26/2019 1030    ESTGFRAFRICA >60 09/26/2019 1030    EGFRNONAA >60 09/26/2019 1030          Assessment:       1. Abnormal ECG    2. Cardiomyopathy, dilated, nonischemic    3. CHF (NYHA class III, ACC/AHA stage C)    4. Essential hypertension    5. Smoker    6. PVC (premature ventricular contraction)      Patient presents for f/u. Doing clinically well. CHF compensated. Results discussed in detail. Will need to optimize medical therapy. Increase Coreg to 25 mg BID. Continue other meds. Needs 24-48 hour holter, she will call back to schedule. EP referral.   Plan:   -Increase Coreg to 25 mg BID  -Continue other meds  -Cardiac low salt diet, discussed dietary restrictions  -24-48 hour Holter, patient will call to schedule  -EP referral   -Declined LifeVest  -Nurse visit same day as EP appt (Yvonne

## 2019-10-18 DIAGNOSIS — I49.3 PVC (PREMATURE VENTRICULAR CONTRACTION): Primary | ICD-10-CM

## 2019-11-04 ENCOUNTER — TELEPHONE (OUTPATIENT)
Dept: CARDIOLOGY | Facility: CLINIC | Age: 54
End: 2019-11-04

## 2019-11-07 ENCOUNTER — CLINICAL SUPPORT (OUTPATIENT)
Dept: CARDIOLOGY | Facility: CLINIC | Age: 54
End: 2019-11-07
Attending: PHYSICIAN ASSISTANT
Payer: COMMERCIAL

## 2019-11-07 DIAGNOSIS — I49.3 PVC (PREMATURE VENTRICULAR CONTRACTION): ICD-10-CM

## 2019-11-07 PROCEDURE — 93224 XTRNL ECG REC UP TO 48 HRS: CPT | Mod: S$GLB,,, | Performed by: INTERNAL MEDICINE

## 2019-11-07 PROCEDURE — 93224 HOLTER MONITOR - 48 HOUR: ICD-10-PCS | Mod: S$GLB,,, | Performed by: INTERNAL MEDICINE

## 2019-11-13 ENCOUNTER — INITIAL CONSULT (OUTPATIENT)
Dept: CARDIOLOGY | Facility: CLINIC | Age: 54
End: 2019-11-13
Payer: COMMERCIAL

## 2019-11-13 ENCOUNTER — TELEPHONE (OUTPATIENT)
Dept: CARDIOLOGY | Facility: CLINIC | Age: 54
End: 2019-11-13

## 2019-11-13 ENCOUNTER — CLINICAL SUPPORT (OUTPATIENT)
Dept: CARDIOLOGY | Facility: CLINIC | Age: 54
End: 2019-11-13
Payer: COMMERCIAL

## 2019-11-13 VITALS
HEART RATE: 78 BPM | HEIGHT: 63 IN | WEIGHT: 139.25 LBS | DIASTOLIC BLOOD PRESSURE: 90 MMHG | BODY MASS INDEX: 24.67 KG/M2 | SYSTOLIC BLOOD PRESSURE: 122 MMHG

## 2019-11-13 DIAGNOSIS — I42.0 CARDIOMYOPATHY, DILATED, NONISCHEMIC: Primary | ICD-10-CM

## 2019-11-13 DIAGNOSIS — I50.22 CHRONIC SYSTOLIC HEART FAILURE: Primary | ICD-10-CM

## 2019-11-13 DIAGNOSIS — I50.9 CHF (NYHA CLASS III, ACC/AHA STAGE C): Chronic | ICD-10-CM

## 2019-11-13 DIAGNOSIS — I50.22 CHRONIC SYSTOLIC HEART FAILURE: ICD-10-CM

## 2019-11-13 DIAGNOSIS — I49.3 PVC (PREMATURE VENTRICULAR CONTRACTION): Primary | ICD-10-CM

## 2019-11-13 DIAGNOSIS — I42.0 CARDIOMYOPATHY, DILATED, NONISCHEMIC: ICD-10-CM

## 2019-11-13 PROCEDURE — 99999 PR PBB SHADOW E&M-EST. PATIENT-LVL III: CPT | Mod: PBBFAC,,, | Performed by: INTERNAL MEDICINE

## 2019-11-13 PROCEDURE — 99245 PR OFFICE CONSULTATION,LEVEL V: ICD-10-PCS | Mod: S$GLB,,, | Performed by: INTERNAL MEDICINE

## 2019-11-13 PROCEDURE — 99245 OFF/OP CONSLTJ NEW/EST HI 55: CPT | Mod: S$GLB,,, | Performed by: INTERNAL MEDICINE

## 2019-11-13 PROCEDURE — 99999 PR PBB SHADOW E&M-EST. PATIENT-LVL III: ICD-10-PCS | Mod: PBBFAC,,, | Performed by: INTERNAL MEDICINE

## 2019-11-13 RX ORDER — AMIODARONE HYDROCHLORIDE 200 MG/1
200 TABLET ORAL DAILY
Qty: 30 TABLET | Refills: 11 | Status: SHIPPED | OUTPATIENT
Start: 2019-11-13 | End: 2020-07-02

## 2019-11-13 NOTE — TELEPHONE ENCOUNTER
Patient contacted and was advised that her;   Holter monitor showed lots of PVC's. Patient was educated on the PVC's.in short detail.  Patient was also advised to mention the  PVC findings for details during her appt with Dr. Russo today. Patient stated understanding with no questions or concerns.

## 2019-11-13 NOTE — PROGRESS NOTES
Subjective:    Patient ID:  Melissa Petty is a 54 y.o. female who presents for evaluation of Cardiomyopathy      54 yoF here for arrhythmia assessment and consideration for ICD. She had NICM diagnosed 2013 when her EF was 30%. She had normal EF 2015 and 2017. At the time of her 30% EF she had <1% PVCs on a holter. She had worsening symptoms leading to reassessment. Her EF was 25-30% on recent echo. PVC burden was 9%. She is on coreg and lisinopril currently and has taken these agents for many years. No syncope ro near syncope. She does not feel palpitations. Mild PAINTER and fatigue. PVCs are LBBB V3 transition, L/I axis.     Holter 10/19: 9% PVCs    NM Stress test 10/19:  Nuclear Quantitative Functional Analysis:   LVEF: 28 %  LVED Volume: 212 ml  LVES Volume: 154 ml    Impression: NORMAL MYOCARDIAL PERFUSION  1. The perfusion scan is free of evidence for myocardial ischemia or injury.   2. There is a mild intensity fixed defect in the anteroapical wall of the left ventricle, likely secondary to soft tissue attenuation.   3. Resting wall motion is physiologic.   4. There is resting LV dysfunction with a reduced ejection fraction of 28 %.   5. The left ventricular volume is mildly increased at rest.   6. The extracardiac distribution of radioactivity is normal.     Echo 10/19:  CONCLUSIONS     1 - Severely depressed left ventricular systolic function (EF 25-30%).     2 - Impaired LV relaxation, normal LAP (grade 1 diastolic dysfunction).     3 - Normal right ventricular systolic function .     4 - Mild to moderate mitral regurgitation.     5 - Mild aortic regurgitation.     6 - Mild left atrial enlargement.     7 - Concentric hypertrophy.     Past Medical History:  No date: Cardiomyopathy  No date: CHF (congestive heart failure)  No date: Hypertension    Past Surgical History:  No date: JOINT REPLACEMENT    Social History    Socioeconomic History      Marital status: Single      Spouse name: Not on file      Number  of children: Not on file      Years of education: Not on file      Highest education level: Not on file    Occupational History      Not on file    Social Needs      Financial resource strain: Not on file      Food insecurity:        Worry: Not on file        Inability: Not on file      Transportation needs:        Medical: Not on file        Non-medical: Not on file    Tobacco Use      Smoking status: Current Every Day Smoker        Packs/day: 0.25        Years: 32.00        Pack years: 8      Smokeless tobacco: Never Used    Substance and Sexual Activity      Alcohol use: No      Drug use: No      Sexual activity: Not on file    Lifestyle      Physical activity:        Days per week: Not on file        Minutes per session: Not on file      Stress: Not on file    Relationships      Social connections:        Talks on phone: Not on file        Gets together: Not on file        Attends [employer] service: Not on file        Active member of club or organization: Not on file        Attends meetings of clubs or organizations: Not on file        Relationship status: Not on file    Other Topics      Concerns:        Not on file    Social History Narrative      Not on file      Review of patient's family history indicates:  Problem: Heart failure      Relation: Mother          Age of Onset: (Not Specified)  Problem: Hypertension      Relation: Brother          Age of Onset: (Not Specified)        Review of Systems   Constitution: Negative for chills, decreased appetite, fever and malaise/fatigue.   HENT: Negative for congestion, hoarse voice and sore throat.    Eyes: Negative for blurred vision and discharge.   Cardiovascular: Positive for palpitations. Negative for chest pain, claudication, cyanosis, dyspnea on exertion, irregular heartbeat, leg swelling, near-syncope, orthopnea and paroxysmal nocturnal dyspnea.   Respiratory: Negative for cough, hemoptysis, shortness of breath, snoring, sputum production and wheezing.     Endocrine: Negative for cold intolerance and heat intolerance.   Hematologic/Lymphatic: Negative for bleeding problem. Does not bruise/bleed easily.   Skin: Negative for rash.   Musculoskeletal: Positive for arthritis and joint pain. Negative for back pain, joint swelling, muscle cramps, muscle weakness and myalgias.   Gastrointestinal: Negative for abdominal pain, constipation, diarrhea, heartburn, melena and nausea.   Genitourinary: Negative for hematuria.   Neurological: Negative for dizziness, focal weakness, headaches, light-headedness, loss of balance, numbness, paresthesias, seizures and weakness.   Psychiatric/Behavioral: Negative for memory loss. The patient does not have insomnia.    Allergic/Immunologic: Negative for hives.        Objective:    Physical Exam   Constitutional: She is oriented to person, place, and time. She appears well-developed and well-nourished. No distress.   HENT:   Head: Normocephalic and atraumatic.   Eyes: Pupils are equal, round, and reactive to light. Right eye exhibits no discharge. Left eye exhibits no discharge.   Neck: Neck supple. No JVD present.   Cardiovascular: Normal rate, regular rhythm, S1 normal, S2 normal, normal heart sounds and intact distal pulses.  Occasional extrasystoles are present.   No murmur heard.  Pulmonary/Chest: Effort normal and breath sounds normal. No respiratory distress. She has no wheezes. She has no rales.   Abdominal: Soft. She exhibits no distension.   Musculoskeletal: She exhibits no edema.   Neurological: She is alert and oriented to person, place, and time.   Skin: Skin is warm and dry. She is not diaphoretic. No erythema.   Psychiatric: She has a normal mood and affect. Her behavior is normal. Thought content normal.   Nursing note and vitals reviewed.        Assessment:       1. PVC (premature ventricular contraction)    2. CHF (NYHA class III, ACC/AHA stage C)    3. Cardiomyopathy, dilated, nonischemic    4. Chronic systolic heart  failure         Plan:       54 yoF here for ICD consideration and PVC management. She has recurrence of cardiomyopathy with 9% PVCs. At that rate, her PVC burden would not be expected to cause cardiomyopathy. Typically PVC cardiomyopathy occurs at rates above 15%. Her PVC morphology is consistent with an OT source. When she had impaired EF in 2013, she had very rare PVCs, <1%. I discussed options including medical management of PVCs, ablation and ICD. We agreed to attempt PVC suppression and reassess PVC burden in 6 weeks. Amiodarone 200 mg qd. Holter and echo in 6 weeks. Follow up in 8 weeks.

## 2019-11-13 NOTE — PROGRESS NOTES
Per providers recommendations pt has agreed to start entresto. She has been instructed to stop lisinopril today 11/13 and start entresto 24/26mg on 11/16.   Bmp has been scheduled in 1 week. With nurse bp/titration visit again in 2 weeks.   The patient has been notified of this information and all questions answered.

## 2019-11-13 NOTE — PATIENT INSTRUCTIONS
Start amiodarone 200 mg once a day  We will recheck your PVCs in January with a holter  We will recheck your heart function in January with an echocardiogram

## 2019-11-13 NOTE — LETTER
November 13, 2019      Faustina Barajas PA-C  [address] [place] [address] [address] [id number]           [facility]  [address] [place] [address] , [address] FLOOR  [address] [address]  Phone: [phone]  Fax: [phone]          Patient: Melissa Petty   MR Number: [id number]   YOB: 1965   Date of Visit: 11/13/2019       Dear Faustina Barajas:    Thank you for referring Melissa Petty to me for evaluation. Attached you will find relevant portions of my assessment and plan of care.    If you have questions, please do not hesitate to call me. I look forward to following Melissa Petty along with you.    Sincerely,    Ryan Russo MD    Enclosure  CC:  No Recipients    If you would like to receive this communication electronically, please contact [email or web address] or [phone] to request more information on [employer] access.    For providers and/or their staff who would like to refer a patient to [facility], please contact us through our one-stop-shop provider referral line, [facility], at [phone].    If you feel you have received this communication in error or would no longer like to receive these types of communications, please e-mail [email or web address]

## 2019-11-13 NOTE — TELEPHONE ENCOUNTER
Please phone patient. Holter monitor showed lots of PVC's.    Keep appt with Dr. Russo today    Thanks

## 2019-11-20 ENCOUNTER — LAB VISIT (OUTPATIENT)
Dept: LAB | Facility: HOSPITAL | Age: 54
End: 2019-11-20
Attending: INTERNAL MEDICINE
Payer: COMMERCIAL

## 2019-11-20 DIAGNOSIS — I50.22 CHRONIC SYSTOLIC HEART FAILURE: ICD-10-CM

## 2019-11-20 LAB
ANION GAP SERPL CALC-SCNC: 7 MMOL/L (ref 8–16)
BUN SERPL-MCNC: 13 MG/DL (ref 6–20)
CALCIUM SERPL-MCNC: 9.4 MG/DL (ref 8.7–10.5)
CHLORIDE SERPL-SCNC: 108 MMOL/L (ref 95–110)
CO2 SERPL-SCNC: 29 MMOL/L (ref 23–29)
CREAT SERPL-MCNC: 0.7 MG/DL (ref 0.5–1.4)
EST. GFR  (AFRICAN AMERICAN): >60 ML/MIN/1.73 M^2
EST. GFR  (NON AFRICAN AMERICAN): >60 ML/MIN/1.73 M^2
GLUCOSE SERPL-MCNC: 94 MG/DL (ref 70–110)
POTASSIUM SERPL-SCNC: 4.3 MMOL/L (ref 3.5–5.1)
SODIUM SERPL-SCNC: 144 MMOL/L (ref 136–145)

## 2019-11-20 PROCEDURE — 36415 COLL VENOUS BLD VENIPUNCTURE: CPT

## 2019-11-20 PROCEDURE — 80048 BASIC METABOLIC PNL TOTAL CA: CPT

## 2019-11-21 ENCOUNTER — TELEPHONE (OUTPATIENT)
Dept: CARDIOLOGY | Facility: HOSPITAL | Age: 54
End: 2019-11-21

## 2019-11-21 NOTE — TELEPHONE ENCOUNTER
Notified of lab results. Pt says she is feeling fine.   She says she had intermittent dizziness before starting entresto but it is no worse than before.   No other c/o at this time. No HF symptoms.

## 2019-11-27 ENCOUNTER — CLINICAL SUPPORT (OUTPATIENT)
Dept: CARDIOLOGY | Facility: CLINIC | Age: 54
End: 2019-11-27
Payer: COMMERCIAL

## 2019-11-27 VITALS
HEIGHT: 63 IN | DIASTOLIC BLOOD PRESSURE: 80 MMHG | WEIGHT: 139.56 LBS | SYSTOLIC BLOOD PRESSURE: 128 MMHG | HEART RATE: 78 BPM | BODY MASS INDEX: 24.73 KG/M2

## 2019-11-27 DIAGNOSIS — I50.22 CHRONIC SYSTOLIC HEART FAILURE: ICD-10-CM

## 2019-11-27 DIAGNOSIS — I50.9 CHF (NYHA CLASS III, ACC/AHA STAGE C): Primary | Chronic | ICD-10-CM

## 2019-11-27 PROCEDURE — 99999 PR PBB SHADOW E&M-EST. PATIENT-LVL II: CPT | Mod: PBBFAC,,,

## 2019-11-27 PROCEDURE — 99999 PR PBB SHADOW E&M-EST. PATIENT-LVL II: ICD-10-PCS | Mod: PBBFAC,,,

## 2019-12-02 DIAGNOSIS — I50.22 CHRONIC SYSTOLIC HEART FAILURE: ICD-10-CM

## 2019-12-02 DIAGNOSIS — I50.9 CHF (NYHA CLASS III, ACC/AHA STAGE C): Chronic | ICD-10-CM

## 2019-12-18 ENCOUNTER — OFFICE VISIT (OUTPATIENT)
Dept: CARDIOLOGY | Facility: CLINIC | Age: 54
End: 2019-12-18
Payer: COMMERCIAL

## 2019-12-18 ENCOUNTER — LAB VISIT (OUTPATIENT)
Dept: LAB | Facility: HOSPITAL | Age: 54
End: 2019-12-18
Attending: PHYSICIAN ASSISTANT
Payer: COMMERCIAL

## 2019-12-18 VITALS
HEART RATE: 65 BPM | HEIGHT: 63 IN | DIASTOLIC BLOOD PRESSURE: 88 MMHG | OXYGEN SATURATION: 96 % | SYSTOLIC BLOOD PRESSURE: 146 MMHG | WEIGHT: 141.31 LBS | BODY MASS INDEX: 25.04 KG/M2

## 2019-12-18 DIAGNOSIS — F17.200 SMOKER: ICD-10-CM

## 2019-12-18 DIAGNOSIS — I49.3 PVC (PREMATURE VENTRICULAR CONTRACTION): Primary | ICD-10-CM

## 2019-12-18 DIAGNOSIS — R94.31 ABNORMAL ECG: ICD-10-CM

## 2019-12-18 DIAGNOSIS — I42.0 CARDIOMYOPATHY, DILATED, NONISCHEMIC: ICD-10-CM

## 2019-12-18 DIAGNOSIS — I50.9 CHF (NYHA CLASS III, ACC/AHA STAGE C): Chronic | ICD-10-CM

## 2019-12-18 DIAGNOSIS — I10 ESSENTIAL HYPERTENSION: ICD-10-CM

## 2019-12-18 LAB
ANION GAP SERPL CALC-SCNC: 8 MMOL/L (ref 8–16)
BUN SERPL-MCNC: 14 MG/DL (ref 6–20)
CALCIUM SERPL-MCNC: 9.1 MG/DL (ref 8.7–10.5)
CHLORIDE SERPL-SCNC: 106 MMOL/L (ref 95–110)
CO2 SERPL-SCNC: 28 MMOL/L (ref 23–29)
CREAT SERPL-MCNC: 0.7 MG/DL (ref 0.5–1.4)
EST. GFR  (AFRICAN AMERICAN): >60 ML/MIN/1.73 M^2
EST. GFR  (NON AFRICAN AMERICAN): >60 ML/MIN/1.73 M^2
GLUCOSE SERPL-MCNC: 73 MG/DL (ref 70–110)
POTASSIUM SERPL-SCNC: 4.1 MMOL/L (ref 3.5–5.1)
SODIUM SERPL-SCNC: 142 MMOL/L (ref 136–145)

## 2019-12-18 PROCEDURE — 99999 PR PBB SHADOW E&M-EST. PATIENT-LVL III: ICD-10-PCS | Mod: PBBFAC,,, | Performed by: PHYSICIAN ASSISTANT

## 2019-12-18 PROCEDURE — 99214 OFFICE O/P EST MOD 30 MIN: CPT | Mod: S$GLB,,, | Performed by: PHYSICIAN ASSISTANT

## 2019-12-18 PROCEDURE — 3008F PR BODY MASS INDEX (BMI) DOCUMENTED: ICD-10-PCS | Mod: CPTII,S$GLB,, | Performed by: PHYSICIAN ASSISTANT

## 2019-12-18 PROCEDURE — 3079F PR MOST RECENT DIASTOLIC BLOOD PRESSURE 80-89 MM HG: ICD-10-PCS | Mod: CPTII,S$GLB,, | Performed by: PHYSICIAN ASSISTANT

## 2019-12-18 PROCEDURE — 3008F BODY MASS INDEX DOCD: CPT | Mod: CPTII,S$GLB,, | Performed by: PHYSICIAN ASSISTANT

## 2019-12-18 PROCEDURE — 36415 COLL VENOUS BLD VENIPUNCTURE: CPT

## 2019-12-18 PROCEDURE — 3079F DIAST BP 80-89 MM HG: CPT | Mod: CPTII,S$GLB,, | Performed by: PHYSICIAN ASSISTANT

## 2019-12-18 PROCEDURE — 99999 PR PBB SHADOW E&M-EST. PATIENT-LVL III: CPT | Mod: PBBFAC,,, | Performed by: PHYSICIAN ASSISTANT

## 2019-12-18 PROCEDURE — 3077F SYST BP >= 140 MM HG: CPT | Mod: CPTII,S$GLB,, | Performed by: PHYSICIAN ASSISTANT

## 2019-12-18 PROCEDURE — 3077F PR MOST RECENT SYSTOLIC BLOOD PRESSURE >= 140 MM HG: ICD-10-PCS | Mod: CPTII,S$GLB,, | Performed by: PHYSICIAN ASSISTANT

## 2019-12-18 PROCEDURE — 80048 BASIC METABOLIC PNL TOTAL CA: CPT

## 2019-12-18 PROCEDURE — 99214 PR OFFICE/OUTPT VISIT, EST, LEVL IV, 30-39 MIN: ICD-10-PCS | Mod: S$GLB,,, | Performed by: PHYSICIAN ASSISTANT

## 2019-12-18 RX ORDER — LISINOPRIL 5 MG/1
5 TABLET ORAL DAILY
COMMUNITY
End: 2019-12-18 | Stop reason: SDUPTHER

## 2019-12-18 NOTE — PROGRESS NOTES
Subjective:    Patient ID:  Melissa Petty is a 54 y.o. female who presents for follow-up of Abnormal ECG (1 month f/u)      HPI  Ms. Petty is a 54 year old female patient whose current medical conditions include chronic systolic CHF/dilated NICM, tobacco abuse, abnormal EKG, and frequent PVC's who presents today for follow-up. Patient returns today and states she is feeling ok. Complains of some lower back pain. Cardiac wise, remains stable. No chest pain, SOB, or other anginal equivalent. NO palpitations. No lightheadedness, dizziness, near syncope, or syncope. No PND, orthopnea, abdominal bloating, or weight gain. BP elevated today in clinic. Patient reports compliance with her medications. Recently started back on Lisinopril due to cost issues with Entresto. Still smoking 1 ppd, not ready to quit.    Evaluated by EP, Dr. Russo and placed on amiodarone. Repeat Holter monitor scheduled for January to reassess PVC burden. Echo to reassess EF planned for that time also.     Review of Systems   Constitution: Negative for chills, decreased appetite, fever and malaise/fatigue.   HENT: Negative for congestion, hoarse voice and sore throat.    Eyes: Negative for blurred vision and discharge.   Cardiovascular: Negative for chest pain, claudication, cyanosis, dyspnea on exertion, irregular heartbeat, leg swelling, near-syncope, orthopnea, palpitations and paroxysmal nocturnal dyspnea.   Respiratory: Negative for cough, hemoptysis, shortness of breath, snoring, sputum production and wheezing.    Endocrine: Negative for cold intolerance and heat intolerance.   Hematologic/Lymphatic: Negative for bleeding problem. Does not bruise/bleed easily.   Skin: Negative for rash.   Musculoskeletal: Positive for back pain. Negative for arthritis, joint pain, joint swelling, muscle cramps, muscle weakness and myalgias.   Gastrointestinal: Negative for abdominal pain, constipation, diarrhea, heartburn, melena and nausea.    Genitourinary: Negative for hematuria.   Neurological: Negative for dizziness, focal weakness, headaches, light-headedness, loss of balance, numbness, paresthesias, seizures and weakness.   Psychiatric/Behavioral: Negative for memory loss. The patient does not have insomnia.    Allergic/Immunologic: Negative for hives.        Objective:    Physical Exam   Constitutional: She is oriented to person, place, and time. She appears well-developed and well-nourished. No distress.   HENT:   Head: Normocephalic and atraumatic.   Eyes: Pupils are equal, round, and reactive to light. Right eye exhibits no discharge. Left eye exhibits no discharge.   Neck: Neck supple. No JVD present.   Cardiovascular: Normal rate, regular rhythm, S1 normal, S2 normal and normal heart sounds.  Occasional extrasystoles are present.   No murmur heard.  Pulmonary/Chest: Effort normal and breath sounds normal. No respiratory distress. She has no wheezes. She has no rales.   Abdominal: Soft. She exhibits no distension. There is no tenderness.   Musculoskeletal: She exhibits no edema.   Neurological: She is alert and oriented to person, place, and time.   Skin: Skin is warm and dry. She is not diaphoretic. No erythema.   Psychiatric: She has a normal mood and affect. Her behavior is normal. Thought content normal.   Nursing note and vitals reviewed.    Impression: NORMAL MYOCARDIAL PERFUSION  1. The perfusion scan is free of evidence for myocardial ischemia or injury.   2. There is a mild intensity fixed defect in the anteroapical wall of the left ventricle, likely secondary to soft tissue attenuation.   3. Resting wall motion is physiologic.   4. There is resting LV dysfunction with a reduced ejection fraction of 28 %.   5. The left ventricular volume is mildly increased at rest.   6. The extracardiac distribution of radioactivity is normal.     2D Echo CONCLUSIONS     1 - Severely depressed left ventricular systolic function (EF 25-30%).     2 -  Impaired LV relaxation, normal LAP (grade 1 diastolic dysfunction).     3 - Normal right ventricular systolic function .     4 - Mild to moderate mitral regurgitation.     5 - Mild aortic regurgitation.     6 - Mild left atrial enlargement.     7 - Concentric hypertrophy.   Assessment:       1. PVC (premature ventricular contraction)    2. Abnormal ECG    3. Cardiomyopathy, dilated, nonischemic    4. CHF (NYHA class III, ACC/AHA stage C)    5. Essential hypertension    6. Smoker      Presents for f/u. Overall, doing well. Stable CV wise. Back on ACEi now due to cost issues with Entresto (she waited appropriate time period). Being followed by EP. On good medical therapy. Increase Lisinopril to 10 mg daily, will plan on repeat BMP in next 2-3 weeks. Keep appt for echo and Holter.   Plan:   -Increase Lisinopril to 10 mg daily  -Continue current medical management and risk factor modification  -Cardiac, low salt diet  -Increase activity level  -Keep f/u appt for Holter and echo  -Will plan on repeat BMP in 2-3 weeks, follow-up TBA pending review of labs  -Counseled on smoking cessation

## 2019-12-19 ENCOUNTER — TELEPHONE (OUTPATIENT)
Dept: CARDIOLOGY | Facility: CLINIC | Age: 54
End: 2019-12-19

## 2019-12-19 NOTE — TELEPHONE ENCOUNTER
Please phone patient. BMP reviewed and is stable. Schedule nurse visit in 2-3 weeks for repeat BP check.    I will decide on f/u labs at that time    Thanks

## 2019-12-19 NOTE — TELEPHONE ENCOUNTER
Patient has been notified of this information.Patient declined to schedule at the moment. Patient said she  has to check her schedule and she will call the clinic back to schedule a 2-3 nurse visit.           FYI only

## 2020-01-13 ENCOUNTER — CLINICAL SUPPORT (OUTPATIENT)
Dept: CARDIOLOGY | Facility: CLINIC | Age: 55
End: 2020-01-13
Attending: INTERNAL MEDICINE
Payer: COMMERCIAL

## 2020-01-13 ENCOUNTER — LAB VISIT (OUTPATIENT)
Dept: LAB | Facility: HOSPITAL | Age: 55
End: 2020-01-13
Attending: PHYSICIAN ASSISTANT
Payer: COMMERCIAL

## 2020-01-13 DIAGNOSIS — I50.22 CHRONIC SYSTOLIC HEART FAILURE: ICD-10-CM

## 2020-01-13 DIAGNOSIS — I49.3 PVC (PREMATURE VENTRICULAR CONTRACTION): ICD-10-CM

## 2020-01-13 DIAGNOSIS — R94.31 NONSPECIFIC ABNORMAL ELECTROCARDIOGRAM (ECG) (EKG): ICD-10-CM

## 2020-01-13 DIAGNOSIS — F17.200 SMOKER: ICD-10-CM

## 2020-01-13 DIAGNOSIS — I42.0 CARDIOMYOPATHY, DILATED, NONISCHEMIC: ICD-10-CM

## 2020-01-13 PROCEDURE — 36415 COLL VENOUS BLD VENIPUNCTURE: CPT

## 2020-01-13 PROCEDURE — 93306 TTE W/DOPPLER COMPLETE: CPT | Mod: S$GLB,,, | Performed by: INTERNAL MEDICINE

## 2020-01-13 PROCEDURE — 80053 COMPREHEN METABOLIC PANEL: CPT

## 2020-01-13 PROCEDURE — 93306 2D ECHO WITH COLOR FLOW DOPPLER: ICD-10-PCS | Mod: S$GLB,,, | Performed by: INTERNAL MEDICINE

## 2020-01-14 ENCOUNTER — TELEPHONE (OUTPATIENT)
Dept: CARDIOLOGY | Facility: CLINIC | Age: 55
End: 2020-01-14

## 2020-01-14 LAB
ALBUMIN SERPL BCP-MCNC: 4 G/DL (ref 3.5–5.2)
ALP SERPL-CCNC: 106 U/L (ref 55–135)
ALT SERPL W/O P-5'-P-CCNC: 9 U/L (ref 10–44)
ANION GAP SERPL CALC-SCNC: 10 MMOL/L (ref 8–16)
AST SERPL-CCNC: 15 U/L (ref 10–40)
BILIRUB SERPL-MCNC: 0.5 MG/DL (ref 0.1–1)
BUN SERPL-MCNC: 16 MG/DL (ref 6–20)
CALCIUM SERPL-MCNC: 9.3 MG/DL (ref 8.7–10.5)
CHLORIDE SERPL-SCNC: 102 MMOL/L (ref 95–110)
CO2 SERPL-SCNC: 29 MMOL/L (ref 23–29)
CREAT SERPL-MCNC: 0.8 MG/DL (ref 0.5–1.4)
DIASTOLIC DYSFUNCTION: NO
EST. GFR  (AFRICAN AMERICAN): >60 ML/MIN/1.73 M^2
EST. GFR  (NON AFRICAN AMERICAN): >60 ML/MIN/1.73 M^2
ESTIMATED PA SYSTOLIC PRESSURE: 19.71
GLUCOSE SERPL-MCNC: 89 MG/DL (ref 70–110)
MITRAL VALVE REGURGITATION: NORMAL
POTASSIUM SERPL-SCNC: 3.7 MMOL/L (ref 3.5–5.1)
PROT SERPL-MCNC: 7.3 G/DL (ref 6–8.4)
RETIRED EF AND QEF - SEE NOTES: 60 (ref 55–65)
SODIUM SERPL-SCNC: 141 MMOL/L (ref 136–145)

## 2020-02-10 DIAGNOSIS — I42.0 CARDIOMYOPATHY, DILATED, NONISCHEMIC: ICD-10-CM

## 2020-02-10 NOTE — TELEPHONE ENCOUNTER
Pt requesting refill on medication. Pt states is taking medication twice a day instead of once a day.Needs the correct instructions on the medication.

## 2020-02-13 ENCOUNTER — HOSPITAL ENCOUNTER (OUTPATIENT)
Dept: CARDIOLOGY | Facility: HOSPITAL | Age: 55
Discharge: HOME | End: 2020-02-13
Attending: INTERNAL MEDICINE
Payer: COMMERCIAL

## 2020-02-13 DIAGNOSIS — I49.3 PVC (PREMATURE VENTRICULAR CONTRACTION): ICD-10-CM

## 2020-02-13 DIAGNOSIS — I50.22 CHRONIC SYSTOLIC HEART FAILURE: ICD-10-CM

## 2020-02-13 PROCEDURE — 93227 XTRNL ECG REC<48 HR R&I: CPT | Mod: ,,, | Performed by: INTERNAL MEDICINE

## 2020-02-13 PROCEDURE — 93225 XTRNL ECG REC<48 HRS REC: CPT

## 2020-02-13 PROCEDURE — 93227 HOLTER MONITOR - 24 HOUR (CUPID ONLY): ICD-10-PCS | Mod: ,,, | Performed by: INTERNAL MEDICINE

## 2020-02-15 LAB
OHS CV EVENT MONITOR DAY: 0
OHS CV HOLTER LENGTH DECIMAL HOURS: 24
OHS CV HOLTER LENGTH HOURS: 24
OHS CV HOLTER LENGTH MINUTES: 0

## 2020-02-18 DIAGNOSIS — I49.3 PVC (PREMATURE VENTRICULAR CONTRACTION): Primary | ICD-10-CM

## 2020-02-19 ENCOUNTER — OFFICE VISIT (OUTPATIENT)
Dept: CARDIOLOGY | Facility: CLINIC | Age: 55
End: 2020-02-19
Payer: COMMERCIAL

## 2020-02-19 ENCOUNTER — HOSPITAL ENCOUNTER (OUTPATIENT)
Dept: CARDIOLOGY | Facility: HOSPITAL | Age: 55
Discharge: HOME | End: 2020-02-19
Attending: INTERNAL MEDICINE
Payer: COMMERCIAL

## 2020-02-19 VITALS
SYSTOLIC BLOOD PRESSURE: 140 MMHG | DIASTOLIC BLOOD PRESSURE: 88 MMHG | HEART RATE: 68 BPM | HEIGHT: 63 IN | WEIGHT: 141.88 LBS | BODY MASS INDEX: 25.14 KG/M2

## 2020-02-19 DIAGNOSIS — I42.0 CARDIOMYOPATHY, DILATED, NONISCHEMIC: ICD-10-CM

## 2020-02-19 DIAGNOSIS — I49.3 PVC (PREMATURE VENTRICULAR CONTRACTION): Primary | ICD-10-CM

## 2020-02-19 DIAGNOSIS — I49.3 PVC (PREMATURE VENTRICULAR CONTRACTION): ICD-10-CM

## 2020-02-19 DIAGNOSIS — I50.22 CHRONIC SYSTOLIC HEART FAILURE: ICD-10-CM

## 2020-02-19 PROCEDURE — 3008F PR BODY MASS INDEX (BMI) DOCUMENTED: ICD-10-PCS | Mod: CPTII,S$GLB,, | Performed by: INTERNAL MEDICINE

## 2020-02-19 PROCEDURE — 3079F DIAST BP 80-89 MM HG: CPT | Mod: CPTII,S$GLB,, | Performed by: INTERNAL MEDICINE

## 2020-02-19 PROCEDURE — 3008F BODY MASS INDEX DOCD: CPT | Mod: CPTII,S$GLB,, | Performed by: INTERNAL MEDICINE

## 2020-02-19 PROCEDURE — 99999 PR PBB SHADOW E&M-EST. PATIENT-LVL III: CPT | Mod: PBBFAC,,, | Performed by: INTERNAL MEDICINE

## 2020-02-19 PROCEDURE — 93010 EKG 12-LEAD: ICD-10-PCS | Mod: ,,, | Performed by: INTERNAL MEDICINE

## 2020-02-19 PROCEDURE — 99214 OFFICE O/P EST MOD 30 MIN: CPT | Mod: S$GLB,,, | Performed by: INTERNAL MEDICINE

## 2020-02-19 PROCEDURE — 3077F PR MOST RECENT SYSTOLIC BLOOD PRESSURE >= 140 MM HG: ICD-10-PCS | Mod: CPTII,S$GLB,, | Performed by: INTERNAL MEDICINE

## 2020-02-19 PROCEDURE — 93010 ELECTROCARDIOGRAM REPORT: CPT | Mod: ,,, | Performed by: INTERNAL MEDICINE

## 2020-02-19 PROCEDURE — 93005 ELECTROCARDIOGRAM TRACING: CPT

## 2020-02-19 PROCEDURE — 3077F SYST BP >= 140 MM HG: CPT | Mod: CPTII,S$GLB,, | Performed by: INTERNAL MEDICINE

## 2020-02-19 PROCEDURE — 99999 PR PBB SHADOW E&M-EST. PATIENT-LVL III: ICD-10-PCS | Mod: PBBFAC,,, | Performed by: INTERNAL MEDICINE

## 2020-02-19 PROCEDURE — 3079F PR MOST RECENT DIASTOLIC BLOOD PRESSURE 80-89 MM HG: ICD-10-PCS | Mod: CPTII,S$GLB,, | Performed by: INTERNAL MEDICINE

## 2020-02-19 PROCEDURE — 99214 PR OFFICE/OUTPT VISIT, EST, LEVL IV, 30-39 MIN: ICD-10-PCS | Mod: S$GLB,,, | Performed by: INTERNAL MEDICINE

## 2020-02-19 NOTE — PROGRESS NOTES
Subjective:    Patient ID:  Melissa Petty is a 55 y.o. female who presents for follow-up of PVC (premature ventricular contraction)      54 yoF here for arrhythmia assessment and consideration for ICD.     11/19: She had NICM diagnosed 2013 when her EF was 30%. She had normal EF 2015 and 2017. At the time of her 30% EF she had <1% PVCs on a holter. She had worsening symptoms leading to reassessment. Her EF was 25-30% on recent echo. PVC burden was 9%. She is on coreg and lisinopril currently and has taken these agents for many years. No syncope ro near syncope. She does not feel palpitations. Mild PAINTER and fatigue. PVCs are LBBB V3 transition, L/I axis.     Amiodarone 200 mg qd started    Interval history: EF normalized 1/2020 and PVC count was 44    Echo 1/2020:  CONCLUSIONS     1 - Eccentric hypertrophy.     2 - No wall motion abnormalities.     3 - Normal left ventricular systolic function (EF 60-65%).     4 - Normal left ventricular diastolic function.     5 - Normal right ventricular systolic function .     6 - The estimated PA systolic pressure is greater than 20 mmHg.     7 - Mild mitral regurgitation.     Holter 2/2020 0% PVCs    Holter 10/19: 9% PVCs    NM Stress test 10/19:  Nuclear Quantitative Functional Analysis:   LVEF: 28 %  LVED Volume: 212 ml  LVES Volume: 154 ml    Impression: NORMAL MYOCARDIAL PERFUSION  1. The perfusion scan is free of evidence for myocardial ischemia or injury.   2. There is a mild intensity fixed defect in the anteroapical wall of the left ventricle, likely secondary to soft tissue attenuation.   3. Resting wall motion is physiologic.   4. There is resting LV dysfunction with a reduced ejection fraction of 28 %.   5. The left ventricular volume is mildly increased at rest.   6. The extracardiac distribution of radioactivity is normal.     Echo 10/19:  CONCLUSIONS     1 - Severely depressed left ventricular systolic function (EF 25-30%).     2 - Impaired LV relaxation, normal  LAP (grade 1 diastolic dysfunction).     3 - Normal right ventricular systolic function .     4 - Mild to moderate mitral regurgitation.     5 - Mild aortic regurgitation.     6 - Mild left atrial enlargement.     7 - Concentric hypertrophy.     Past Medical History:  No date: Cardiomyopathy  No date: CHF (congestive heart failure)  No date: Hypertension    Past Surgical History:  No date: JOINT REPLACEMENT    Social History    Socioeconomic History      Marital status: Single      Spouse name: Not on file      Number of children: Not on file      Years of education: Not on file      Highest education level: Not on file    Occupational History      Not on file    Social Needs      Financial resource strain: Not on file      Food insecurity:        Worry: Not on file        Inability: Not on file      Transportation needs:        Medical: Not on file        Non-medical: Not on file    Tobacco Use      Smoking status: Current Every Day Smoker        Packs/day: 0.25        Years: 32.00        Pack years: 8      Smokeless tobacco: Never Used    Substance and Sexual Activity      Alcohol use: Yes        Comment: Occassionally      Drug use: No      Sexual activity: Not on file    Lifestyle      Physical activity:        Days per week: Not on file        Minutes per session: Not on file      Stress: Not on file    Relationships      Social connections:        Talks on phone: Not on file        Gets together: Not on file        Attends Orthodox service: Not on file        Active member of club or organization: Not on file        Attends meetings of clubs or organizations: Not on file        Relationship status: Not on file    Other Topics      Concerns:        Not on file    Social History Narrative      Not on file      Review of patient's family history indicates:  Problem: Heart failure      Relation: Mother          Age of Onset: (Not Specified)  Problem: Hypertension      Relation: Brother          Age of Onset: (Not  Specified)        Review of Systems   Constitution: Negative for chills, decreased appetite, fever and malaise/fatigue.   HENT: Negative for congestion, hoarse voice and sore throat.    Eyes: Negative for blurred vision and discharge.   Cardiovascular: Positive for palpitations. Negative for chest pain, claudication, cyanosis, dyspnea on exertion, irregular heartbeat, leg swelling, near-syncope, orthopnea and paroxysmal nocturnal dyspnea.   Respiratory: Negative for cough, hemoptysis, shortness of breath, snoring, sputum production and wheezing.    Endocrine: Negative for cold intolerance and heat intolerance.   Hematologic/Lymphatic: Negative for bleeding problem. Does not bruise/bleed easily.   Skin: Negative for rash.   Musculoskeletal: Positive for arthritis and joint pain. Negative for back pain, joint swelling, muscle cramps, muscle weakness and myalgias.   Gastrointestinal: Negative for abdominal pain, constipation, diarrhea, heartburn, melena and nausea.   Genitourinary: Negative for hematuria.   Neurological: Negative for dizziness, focal weakness, headaches, light-headedness, loss of balance, numbness, paresthesias, seizures and weakness.   Psychiatric/Behavioral: Negative for memory loss. The patient does not have insomnia.    Allergic/Immunologic: Negative for hives.        Objective:    Physical Exam   Constitutional: She is oriented to person, place, and time. She appears well-developed and well-nourished. No distress.   HENT:   Head: Normocephalic and atraumatic.   Eyes: Pupils are equal, round, and reactive to light. Right eye exhibits no discharge. Left eye exhibits no discharge.   Neck: Neck supple. No JVD present.   Cardiovascular: Normal rate, regular rhythm, S1 normal, S2 normal, normal heart sounds and intact distal pulses.  Occasional extrasystoles are present.   No murmur heard.  Pulmonary/Chest: Effort normal and breath sounds normal. No respiratory distress. She has no wheezes. She has no  rales.   Abdominal: Soft. She exhibits no distension.   Musculoskeletal: She exhibits no edema.   Neurological: She is alert and oriented to person, place, and time.   Skin: Skin is warm and dry. She is not diaphoretic. No erythema.   Psychiatric: She has a normal mood and affect. Her behavior is normal. Thought content normal.   Nursing note and vitals reviewed.    ECG: NSR nl MO, QRS, QTc        Assessment:       1. PVC (premature ventricular contraction)    2. Chronic systolic heart failure    3. Cardiomyopathy, dilated, nonischemic         Plan:       55 yoF here for PVC management. EF normalized with resolution of PVCs on amiodarone 200 mg qd. Continue current therapy. Will reassess in 6m and stop amio if no PVCs.

## 2020-03-23 ENCOUNTER — TELEPHONE (OUTPATIENT)
Dept: CARDIOLOGY | Facility: CLINIC | Age: 55
End: 2020-03-23

## 2020-03-23 NOTE — TELEPHONE ENCOUNTER
Pt called with concerns/questions regarding covid-19. Advised precautions to take to limit germs, social distancing etc.   She denies any HF symptoms at this time.

## 2020-05-26 ENCOUNTER — HOSPITAL ENCOUNTER (INPATIENT)
Facility: HOSPITAL | Age: 55
LOS: 2 days | Discharge: HOME | DRG: 291 | End: 2020-05-28
Attending: EMERGENCY MEDICINE | Admitting: FAMILY MEDICINE
Payer: COMMERCIAL

## 2020-05-26 ENCOUNTER — OFFICE VISIT (OUTPATIENT)
Dept: URGENT CARE | Facility: CLINIC | Age: 55
DRG: 291 | End: 2020-05-26
Payer: COMMERCIAL

## 2020-05-26 VITALS
HEART RATE: 84 BPM | HEIGHT: 63 IN | BODY MASS INDEX: 25.33 KG/M2 | WEIGHT: 142.94 LBS | SYSTOLIC BLOOD PRESSURE: 179 MMHG | DIASTOLIC BLOOD PRESSURE: 127 MMHG | TEMPERATURE: 97 F | OXYGEN SATURATION: 92 %

## 2020-05-26 DIAGNOSIS — J96.01 ACUTE RESPIRATORY FAILURE WITH HYPOXIA AND HYPERCAPNIA: ICD-10-CM

## 2020-05-26 DIAGNOSIS — I50.22 CHRONIC SYSTOLIC HEART FAILURE: ICD-10-CM

## 2020-05-26 DIAGNOSIS — R06.02 SOB (SHORTNESS OF BREATH): ICD-10-CM

## 2020-05-26 DIAGNOSIS — J44.1 COPD EXACERBATION: Primary | ICD-10-CM

## 2020-05-26 DIAGNOSIS — J96.02 ACUTE RESPIRATORY FAILURE WITH HYPOXIA AND HYPERCAPNIA: ICD-10-CM

## 2020-05-26 DIAGNOSIS — F17.200 TOBACCO USE DISORDER: Primary | ICD-10-CM

## 2020-05-26 DIAGNOSIS — I50.9 ACUTE EXACERBATION OF CHF (CONGESTIVE HEART FAILURE): ICD-10-CM

## 2020-05-26 DIAGNOSIS — I50.9 ACUTE ON CHRONIC CONGESTIVE HEART FAILURE, UNSPECIFIED HEART FAILURE TYPE: ICD-10-CM

## 2020-05-26 DIAGNOSIS — I42.0 CARDIOMYOPATHY, DILATED, NONISCHEMIC: ICD-10-CM

## 2020-05-26 DIAGNOSIS — J44.1 COPD WITH ACUTE EXACERBATION: ICD-10-CM

## 2020-05-26 DIAGNOSIS — I10 ESSENTIAL HYPERTENSION: ICD-10-CM

## 2020-05-26 DIAGNOSIS — R07.9 CHEST PAIN: ICD-10-CM

## 2020-05-26 DIAGNOSIS — R53.83 FATIGUE, UNSPECIFIED TYPE: ICD-10-CM

## 2020-05-26 DIAGNOSIS — I50.9 CHF (NYHA CLASS III, ACC/AHA STAGE C): Chronic | ICD-10-CM

## 2020-05-26 PROBLEM — I16.1 HYPERTENSIVE EMERGENCY: Status: ACTIVE | Noted: 2020-05-26

## 2020-05-26 LAB
ALBUMIN SERPL BCP-MCNC: 4.3 G/DL (ref 3.5–5.2)
ALLENS TEST: ABNORMAL
ALLENS TEST: ABNORMAL
ALP SERPL-CCNC: 127 U/L (ref 55–135)
ALT SERPL W/O P-5'-P-CCNC: 30 U/L (ref 10–44)
ANION GAP SERPL CALC-SCNC: 13 MMOL/L (ref 8–16)
AST SERPL-CCNC: 40 U/L (ref 10–40)
BASOPHILS # BLD AUTO: 0.06 K/UL (ref 0–0.2)
BASOPHILS NFR BLD: 0.5 % (ref 0–1.9)
BILIRUB SERPL-MCNC: 0.9 MG/DL (ref 0.1–1)
BNP SERPL-MCNC: 2066 PG/ML (ref 0–99)
BUN SERPL-MCNC: 12 MG/DL (ref 6–20)
CALCIUM SERPL-MCNC: 9.2 MG/DL (ref 8.7–10.5)
CHLORIDE SERPL-SCNC: 105 MMOL/L (ref 95–110)
CO2 SERPL-SCNC: 22 MMOL/L (ref 23–29)
CREAT SERPL-MCNC: 0.9 MG/DL (ref 0.5–1.4)
DELSYS: ABNORMAL
DELSYS: ABNORMAL
DIFFERENTIAL METHOD: ABNORMAL
EOSINOPHIL # BLD AUTO: 0.4 K/UL (ref 0–0.5)
EOSINOPHIL NFR BLD: 2.8 % (ref 0–8)
EP: 6
ERYTHROCYTE [DISTWIDTH] IN BLOOD BY AUTOMATED COUNT: 13.2 % (ref 11.5–14.5)
ERYTHROCYTE [SEDIMENTATION RATE] IN BLOOD BY WESTERGREN METHOD: 12 MM/H
ERYTHROCYTE [SEDIMENTATION RATE] IN BLOOD BY WESTERGREN METHOD: 25 MM/H
EST. GFR  (AFRICAN AMERICAN): >60 ML/MIN/1.73 M^2
EST. GFR  (NON AFRICAN AMERICAN): >60 ML/MIN/1.73 M^2
FIO2: 65
FIO2: 80
GLUCOSE SERPL-MCNC: 153 MG/DL (ref 70–110)
HCO3 UR-SCNC: 26 MMOL/L (ref 24–28)
HCO3 UR-SCNC: 26.6 MMOL/L (ref 24–28)
HCT VFR BLD AUTO: 57.3 % (ref 37–48.5)
HGB BLD-MCNC: 18 G/DL (ref 12–16)
IMM GRANULOCYTES # BLD AUTO: 0.04 K/UL (ref 0–0.04)
IMM GRANULOCYTES NFR BLD AUTO: 0.3 % (ref 0–0.5)
IP: 12
LACTATE SERPL-SCNC: 3.9 MMOL/L (ref 0.5–2.2)
LYMPHOCYTES # BLD AUTO: 4 K/UL (ref 1–4.8)
LYMPHOCYTES NFR BLD: 32.2 % (ref 18–48)
MCH RBC QN AUTO: 32.4 PG (ref 27–31)
MCHC RBC AUTO-ENTMCNC: 31.4 G/DL (ref 32–36)
MCV RBC AUTO: 103 FL (ref 82–98)
MODE: ABNORMAL
MODE: ABNORMAL
MONOCYTES # BLD AUTO: 0.8 K/UL (ref 0.3–1)
MONOCYTES NFR BLD: 6.2 % (ref 4–15)
NEUTROPHILS # BLD AUTO: 7.2 K/UL (ref 1.8–7.7)
NEUTROPHILS NFR BLD: 58 % (ref 38–73)
NRBC BLD-RTO: 0 /100 WBC
PCO2 BLDA: 55.7 MMHG (ref 35–45)
PCO2 BLDA: 71.5 MMHG (ref 35–45)
PH SMN: 7.18 [PH] (ref 7.35–7.45)
PH SMN: 7.28 [PH] (ref 7.35–7.45)
PLATELET # BLD AUTO: 197 K/UL (ref 150–350)
PMV BLD AUTO: 10.6 FL (ref 9.2–12.9)
PO2 BLDA: 83 MMHG (ref 80–100)
PO2 BLDA: 94 MMHG (ref 80–100)
POC BE: -1 MMOL/L
POC BE: -2 MMOL/L
POC SATURATED O2: 94 % (ref 95–100)
POC SATURATED O2: 95 % (ref 95–100)
POTASSIUM SERPL-SCNC: 4.6 MMOL/L (ref 3.5–5.1)
PROCALCITONIN SERPL IA-MCNC: 0.07 NG/ML
PROT SERPL-MCNC: 8.1 G/DL (ref 6–8.4)
RBC # BLD AUTO: 5.55 M/UL (ref 4–5.4)
SAMPLE: ABNORMAL
SAMPLE: ABNORMAL
SARS-COV-2 RDRP RESP QL NAA+PROBE: NEGATIVE
SITE: ABNORMAL
SITE: ABNORMAL
SODIUM SERPL-SCNC: 140 MMOL/L (ref 136–145)
TROPONIN I SERPL DL<=0.01 NG/ML-MCNC: 0.01 NG/ML (ref 0–0.03)
TROPONIN I SERPL DL<=0.01 NG/ML-MCNC: 0.03 NG/ML (ref 0–0.03)
VT: 350
WBC # BLD AUTO: 12.44 K/UL (ref 3.9–12.7)

## 2020-05-26 PROCEDURE — 3077F SYST BP >= 140 MM HG: CPT | Mod: CPTII,S$GLB,, | Performed by: NURSE PRACTITIONER

## 2020-05-26 PROCEDURE — 3080F PR MOST RECENT DIASTOLIC BLOOD PRESSURE >= 90 MM HG: ICD-10-PCS | Mod: CPTII,S$GLB,, | Performed by: NURSE PRACTITIONER

## 2020-05-26 PROCEDURE — 83605 ASSAY OF LACTIC ACID: CPT

## 2020-05-26 PROCEDURE — 93010 ELECTROCARDIOGRAM REPORT: CPT | Mod: ,,, | Performed by: INTERNAL MEDICINE

## 2020-05-26 PROCEDURE — 99291 CRITICAL CARE FIRST HOUR: CPT | Mod: 25

## 2020-05-26 PROCEDURE — 3077F PR MOST RECENT SYSTOLIC BLOOD PRESSURE >= 140 MM HG: ICD-10-PCS | Mod: CPTII,S$GLB,, | Performed by: NURSE PRACTITIONER

## 2020-05-26 PROCEDURE — 36600 WITHDRAWAL OF ARTERIAL BLOOD: CPT

## 2020-05-26 PROCEDURE — 99900035 HC TECH TIME PER 15 MIN (STAT)

## 2020-05-26 PROCEDURE — 99999 PR PBB SHADOW E&M-EST. PATIENT-LVL III: ICD-10-PCS | Mod: PBBFAC,,, | Performed by: NURSE PRACTITIONER

## 2020-05-26 PROCEDURE — 96365 THER/PROPH/DIAG IV INF INIT: CPT | Mod: 59

## 2020-05-26 PROCEDURE — 63600175 PHARM REV CODE 636 W HCPCS: Performed by: EMERGENCY MEDICINE

## 2020-05-26 PROCEDURE — 3008F PR BODY MASS INDEX (BMI) DOCUMENTED: ICD-10-PCS | Mod: CPTII,S$GLB,, | Performed by: NURSE PRACTITIONER

## 2020-05-26 PROCEDURE — 99213 PR OFFICE/OUTPT VISIT, EST, LEVL III, 20-29 MIN: ICD-10-PCS | Mod: S$GLB,,, | Performed by: NURSE PRACTITIONER

## 2020-05-26 PROCEDURE — 99999 PR PBB SHADOW E&M-EST. PATIENT-LVL III: CPT | Mod: PBBFAC,,, | Performed by: NURSE PRACTITIONER

## 2020-05-26 PROCEDURE — 93010 EKG 12-LEAD: ICD-10-PCS | Mod: ,,, | Performed by: INTERNAL MEDICINE

## 2020-05-26 PROCEDURE — 25000003 PHARM REV CODE 250: Performed by: FAMILY MEDICINE

## 2020-05-26 PROCEDURE — 96366 THER/PROPH/DIAG IV INF ADDON: CPT

## 2020-05-26 PROCEDURE — 3008F BODY MASS INDEX DOCD: CPT | Mod: CPTII,S$GLB,, | Performed by: NURSE PRACTITIONER

## 2020-05-26 PROCEDURE — 82803 BLOOD GASES ANY COMBINATION: CPT

## 2020-05-26 PROCEDURE — 84145 PROCALCITONIN (PCT): CPT

## 2020-05-26 PROCEDURE — 99213 OFFICE O/P EST LOW 20 MIN: CPT | Mod: S$GLB,,, | Performed by: NURSE PRACTITIONER

## 2020-05-26 PROCEDURE — 27000190 HC CPAP FULL FACE MASK W/VALVE

## 2020-05-26 PROCEDURE — 63600175 PHARM REV CODE 636 W HCPCS: Performed by: FAMILY MEDICINE

## 2020-05-26 PROCEDURE — 51702 INSERT TEMP BLADDER CATH: CPT

## 2020-05-26 PROCEDURE — 25000242 PHARM REV CODE 250 ALT 637 W/ HCPCS: Performed by: FAMILY MEDICINE

## 2020-05-26 PROCEDURE — 84484 ASSAY OF TROPONIN QUANT: CPT | Mod: 91

## 2020-05-26 PROCEDURE — 85025 COMPLETE CBC W/AUTO DIFF WBC: CPT

## 2020-05-26 PROCEDURE — 84484 ASSAY OF TROPONIN QUANT: CPT

## 2020-05-26 PROCEDURE — 96375 TX/PRO/DX INJ NEW DRUG ADDON: CPT

## 2020-05-26 PROCEDURE — 63700000 PHARM REV CODE 250 ALT 637 W/O HCPCS: Performed by: FAMILY MEDICINE

## 2020-05-26 PROCEDURE — 3080F DIAST BP >= 90 MM HG: CPT | Mod: CPTII,S$GLB,, | Performed by: NURSE PRACTITIONER

## 2020-05-26 PROCEDURE — 93005 ELECTROCARDIOGRAM TRACING: CPT

## 2020-05-26 PROCEDURE — 25000003 PHARM REV CODE 250: Performed by: EMERGENCY MEDICINE

## 2020-05-26 PROCEDURE — 83880 ASSAY OF NATRIURETIC PEPTIDE: CPT

## 2020-05-26 PROCEDURE — 20000000 HC ICU ROOM

## 2020-05-26 PROCEDURE — 87040 BLOOD CULTURE FOR BACTERIA: CPT

## 2020-05-26 PROCEDURE — 94640 AIRWAY INHALATION TREATMENT: CPT

## 2020-05-26 PROCEDURE — 25000242 PHARM REV CODE 250 ALT 637 W/ HCPCS: Performed by: EMERGENCY MEDICINE

## 2020-05-26 PROCEDURE — 80053 COMPREHEN METABOLIC PANEL: CPT

## 2020-05-26 PROCEDURE — U0002 COVID-19 LAB TEST NON-CDC: HCPCS

## 2020-05-26 RX ORDER — ASPIRIN 81 MG/1
81 TABLET ORAL DAILY
Status: DISCONTINUED | OUTPATIENT
Start: 2020-05-27 | End: 2020-05-28 | Stop reason: HOSPADM

## 2020-05-26 RX ORDER — PREDNISONE 20 MG/1
20 TABLET ORAL DAILY
Status: DISCONTINUED | OUTPATIENT
Start: 2020-05-27 | End: 2020-05-28 | Stop reason: HOSPADM

## 2020-05-26 RX ORDER — IPRATROPIUM BROMIDE AND ALBUTEROL SULFATE 2.5; .5 MG/3ML; MG/3ML
3 SOLUTION RESPIRATORY (INHALATION)
Status: DISCONTINUED | OUTPATIENT
Start: 2020-05-26 | End: 2020-05-28 | Stop reason: HOSPADM

## 2020-05-26 RX ORDER — ENOXAPARIN SODIUM 100 MG/ML
40 INJECTION SUBCUTANEOUS EVERY 24 HOURS
Status: DISCONTINUED | OUTPATIENT
Start: 2020-05-26 | End: 2020-05-28 | Stop reason: HOSPADM

## 2020-05-26 RX ORDER — ACETAMINOPHEN 325 MG/1
650 TABLET ORAL EVERY 6 HOURS PRN
Status: DISCONTINUED | OUTPATIENT
Start: 2020-05-26 | End: 2020-05-28 | Stop reason: HOSPADM

## 2020-05-26 RX ORDER — METHYLPREDNISOLONE SOD SUCC 125 MG
125 VIAL (EA) INJECTION
Status: COMPLETED | OUTPATIENT
Start: 2020-05-26 | End: 2020-05-26

## 2020-05-26 RX ORDER — AZITHROMYCIN 250 MG/1
250 TABLET, FILM COATED ORAL DAILY
Status: DISCONTINUED | OUTPATIENT
Start: 2020-05-27 | End: 2020-05-28 | Stop reason: HOSPADM

## 2020-05-26 RX ORDER — ONDANSETRON 2 MG/ML
4 INJECTION INTRAMUSCULAR; INTRAVENOUS EVERY 6 HOURS PRN
Status: DISCONTINUED | OUTPATIENT
Start: 2020-05-27 | End: 2020-05-28 | Stop reason: HOSPADM

## 2020-05-26 RX ORDER — CARVEDILOL 12.5 MG/1
25 TABLET ORAL 2 TIMES DAILY WITH MEALS
Status: DISCONTINUED | OUTPATIENT
Start: 2020-05-27 | End: 2020-05-28 | Stop reason: HOSPADM

## 2020-05-26 RX ORDER — ASPIRIN 325 MG
325 TABLET ORAL
Status: COMPLETED | OUTPATIENT
Start: 2020-05-26 | End: 2020-05-26

## 2020-05-26 RX ORDER — NITROGLYCERIN 20 MG/100ML
5 INJECTION INTRAVENOUS CONTINUOUS
Status: DISCONTINUED | OUTPATIENT
Start: 2020-05-26 | End: 2020-05-26 | Stop reason: SDUPTHER

## 2020-05-26 RX ORDER — NITROGLYCERIN 20 MG/100ML
5 INJECTION INTRAVENOUS CONTINUOUS
Status: DISCONTINUED | OUTPATIENT
Start: 2020-05-26 | End: 2020-05-27

## 2020-05-26 RX ORDER — NITROGLYCERIN 20 MG/100ML
INJECTION INTRAVENOUS
Status: DISPENSED
Start: 2020-05-26 | End: 2020-05-27

## 2020-05-26 RX ORDER — ONDANSETRON 2 MG/ML
4 INJECTION INTRAMUSCULAR; INTRAVENOUS
Status: COMPLETED | OUTPATIENT
Start: 2020-05-26 | End: 2020-05-26

## 2020-05-26 RX ORDER — AMIODARONE HYDROCHLORIDE 200 MG/1
200 TABLET ORAL DAILY
Status: DISCONTINUED | OUTPATIENT
Start: 2020-05-27 | End: 2020-05-28 | Stop reason: HOSPADM

## 2020-05-26 RX ORDER — LEVOFLOXACIN 5 MG/ML
750 INJECTION, SOLUTION INTRAVENOUS
Status: COMPLETED | OUTPATIENT
Start: 2020-05-26 | End: 2020-05-26

## 2020-05-26 RX ORDER — HYDRALAZINE HYDROCHLORIDE 20 MG/ML
10 INJECTION INTRAMUSCULAR; INTRAVENOUS EVERY 6 HOURS PRN
Status: DISCONTINUED | OUTPATIENT
Start: 2020-05-26 | End: 2020-05-28 | Stop reason: HOSPADM

## 2020-05-26 RX ORDER — IPRATROPIUM BROMIDE AND ALBUTEROL SULFATE 2.5; .5 MG/3ML; MG/3ML
3 SOLUTION RESPIRATORY (INHALATION)
Status: COMPLETED | OUTPATIENT
Start: 2020-05-26 | End: 2020-05-26

## 2020-05-26 RX ORDER — FUROSEMIDE 20 MG/1
20 TABLET ORAL DAILY
Status: DISCONTINUED | OUTPATIENT
Start: 2020-05-27 | End: 2020-05-28 | Stop reason: HOSPADM

## 2020-05-26 RX ORDER — AZITHROMYCIN 250 MG/1
500 TABLET, FILM COATED ORAL ONCE
Status: COMPLETED | OUTPATIENT
Start: 2020-05-26 | End: 2020-05-26

## 2020-05-26 RX ORDER — FUROSEMIDE 10 MG/ML
40 INJECTION INTRAMUSCULAR; INTRAVENOUS
Status: COMPLETED | OUTPATIENT
Start: 2020-05-26 | End: 2020-05-26

## 2020-05-26 RX ADMIN — ENOXAPARIN SODIUM 40 MG: 100 INJECTION SUBCUTANEOUS at 08:05

## 2020-05-26 RX ADMIN — METHYLPREDNISOLONE SODIUM SUCCINATE 125 MG: 125 INJECTION, POWDER, FOR SOLUTION INTRAMUSCULAR; INTRAVENOUS at 04:05

## 2020-05-26 RX ADMIN — ASPIRIN 325 MG: 325 TABLET, FILM COATED ORAL at 05:05

## 2020-05-26 RX ADMIN — FUROSEMIDE 40 MG: 10 INJECTION, SOLUTION INTRAMUSCULAR; INTRAVENOUS at 05:05

## 2020-05-26 RX ADMIN — IPRATROPIUM BROMIDE AND ALBUTEROL SULFATE 3 ML: .5; 3 SOLUTION RESPIRATORY (INHALATION) at 08:05

## 2020-05-26 RX ADMIN — NITROGLYCERIN 5 MCG/MIN: 20 INJECTION INTRAVENOUS at 05:05

## 2020-05-26 RX ADMIN — AZITHROMYCIN MONOHYDRATE 500 MG: 250 TABLET ORAL at 08:05

## 2020-05-26 RX ADMIN — IPRATROPIUM BROMIDE AND ALBUTEROL SULFATE 3 ML: .5; 3 SOLUTION RESPIRATORY (INHALATION) at 04:05

## 2020-05-26 RX ADMIN — LEVOFLOXACIN 750 MG: 5 INJECTION, SOLUTION INTRAVENOUS at 05:05

## 2020-05-26 RX ADMIN — NITROGLYCERIN 1 INCH: 20 OINTMENT TOPICAL at 04:05

## 2020-05-26 RX ADMIN — ACETAMINOPHEN 650 MG: 325 TABLET ORAL at 09:05

## 2020-05-26 RX ADMIN — ONDANSETRON 4 MG: 2 INJECTION INTRAMUSCULAR; INTRAVENOUS at 11:05

## 2020-05-26 NOTE — ED NOTES
Dr. Jacobsen at , pt is hypoxic, cyanotic, diaphoretic, in respiratory distress, patient in tripod position with labored breathing and accessory muscle use, CN aware, will move patient to critical care room.

## 2020-05-26 NOTE — ED NOTES
Pt with purewick in place. Purewick leaking and urine is pooling around pt. Pt agrees to vazquez catheter placement for skin integrity and accurate I&Os secondary to inability to move around without worsening of sob and work of breathing.

## 2020-05-26 NOTE — ED PROVIDER NOTES
SCRIBE #1 NOTE: I, Best Duran, am scribing for, and in the presence of, Julianna Jin Do, MD. I have scribed the entire note.       History     Chief Complaint   Patient presents with    Shortness of Breath     started last night, sent over by urgent care.      Review of patient's allergies indicates:   Allergen Reactions    Pcn [penicillins]      Unknown reaction           History of Present Illness     HPI    5/26/2020, 4:33 PM  History obtained from the patient   HPI limited due to severe respiratory distress of pt      History of Present Illness: Melissa Petty is a 55 y.o. female patient with a PMHx of cardiomyopathy, CHF, COPD, and HTN who presents to the Emergency Department for evaluation of shortness of breath which onset gradually last night. Symptoms are constant and severe. No mitigating or exacerbating factors reported. No associated sxs reported. Patient denies any fever. No prior Tx reported. No further complaints or concerns at this time.       Arrival mode: Personal vehicle    PCP: Primary Doctor No        Past Medical History:  Past Medical History:   Diagnosis Date    Cardiomyopathy     CHF (congestive heart failure)     COPD exacerbation 5/26/2020    Hypertension        Past Surgical History:  Past Surgical History:   Procedure Laterality Date    JOINT REPLACEMENT           Family History:  Family History   Problem Relation Age of Onset    Heart failure Mother     Hypertension Brother        Social History:  Social History     Tobacco Use    Smoking status: Current Every Day Smoker     Packs/day: 0.25     Years: 32.00     Pack years: 8.00    Smokeless tobacco: Never Used   Substance and Sexual Activity    Alcohol use: Yes     Comment: Occassionally    Drug use: No    Sexual activity: Unknown        Review of Systems     Review of Systems   Unable to perform ROS: Severe respiratory distress   Constitutional: Negative for fever.   Respiratory: Positive for shortness of breath.        Physical Exam     Initial Vitals [05/26/20 1624]   BP Pulse Resp Temp SpO2   (!) 177/109 93 (!) 24 97.9 °F (36.6 °C) (!) 86 %      MAP       --          Physical Exam  Nursing Notes and Vital Signs Reviewed.  Constitutional: Patient is in severe distress. Well-developed and well-nourished. Diaphoretic.  Head: Atraumatic. Normocephalic.  Eyes: PERRL. EOM intact. Conjunctivae are not pale. No scleral icterus.  ENT: Mucous membranes are moist. Oropharynx is clear and symmetric.    Neck: Supple. Full ROM.  Cardiovascular: Regular rate. Regular rhythm. No murmurs, rubs, or gallops. Distal pulses are 2+ and symmetric.  Pulmonary/Chest: Severe respiratory distress. Severe retractions. Decreased breath sounds throughout. Pt only able to speak one word at a time. No wheezing.  Abdominal: Soft and non-distended. There is no tenderness to palpation. No rebound or guarding.  Genitourinary: No CVA tenderness  Musculoskeletal: Moves all extremities. No obvious deformities. No edema. No calf tenderness.  Skin: Warm and dry.  Neurological:  Alert, awake, and appropriate. No acute focal neurological deficits are appreciated.  Psychiatric: Normal affect. Good eye contact. Appropriate in content.     ED Course   Critical Care  Date/Time: 5/26/2020 7:14 PM  Performed by: Julianna Jin Do, MD  Authorized by: Julianna Jin Do, MD   Direct patient critical care time: 15 minutes  Additional history critical care time: 5 minutes  Ordering / reviewing critical care time: 10 minutes  Documentation critical care time: 5 minutes  Consulting other physicians critical care time: 5 minutes  Total critical care time (exclusive of procedural time) : 40 minutes  Critical care time was exclusive of separately billable procedures and treating other patients and teaching time.  Critical care was necessary to treat or prevent imminent or life-threatening deterioration of the following conditions: respiratory failure and cardiac failure (Acute  respiratory failure, COPD, and heart failure).  Critical care was time spent personally by me on the following activities: blood draw for specimens, development of treatment plan with patient or surrogate, discussions with consultants, interpretation of cardiac output measurements, evaluation of patient's response to treatment, examination of patient, obtaining history from patient or surrogate, ordering and performing treatments and interventions, ordering and review of laboratory studies, ordering and review of radiographic studies, pulse oximetry, re-evaluation of patient's condition and review of old charts.        ED Vital Signs:  Vitals:    05/26/20 1750 05/26/20 1755 05/26/20 1800 05/26/20 1803   BP: (!) 166/99 (!) 168/98 (!) 157/95    Pulse: 106 106 102 101   Resp: (!) 32 (!) 38 (!) 31 (!) 31   Temp:       TempSrc:       SpO2: 98% 98% 98% 98%   Weight:       Height:        05/26/20 1805 05/26/20 1810 05/26/20 1815 05/26/20 1820   BP: (!) 169/99 (!) 166/97 (!) 162/98    Pulse: 103 98 95 92   Resp: (!) 31 (!) 29 (!) 30 (!) 29   Temp:       TempSrc:       SpO2: 98% 96% 97% 96%   Weight:       Height:        05/26/20 1825 05/26/20 1830 05/26/20 1835 05/26/20 1840   BP: (!) 164/96 (!) 155/86 (!) 146/85 135/79   Pulse: 91 89 87 85   Resp: (!) 29 (!) 36 (!) 35 (!) 33   Temp:       TempSrc:       SpO2: 95% 95% 95% 95%   Weight:       Height:        05/26/20 1846 05/26/20 1901 05/26/20 1916   BP: 134/80 (!) 153/85 (!) 150/88   Pulse: 85 87 80   Resp: (!) 36 (!) 32 (!) 36   Temp: 97.1 °F (36.2 °C)     TempSrc: Axillary     SpO2: (!) 94% 95% 95%   Weight:      Height:          Abnormal Lab Results:  Labs Reviewed   CBC W/ AUTO DIFFERENTIAL - Abnormal; Notable for the following components:       Result Value    RBC 5.55 (*)     Hemoglobin 18.0 (*)     Hematocrit 57.3 (*)     Mean Corpuscular Volume 103 (*)     Mean Corpuscular Hemoglobin 32.4 (*)     Mean Corpuscular Hemoglobin Conc 31.4 (*)     All other components  within normal limits   COMPREHENSIVE METABOLIC PANEL - Abnormal; Notable for the following components:    CO2 22 (*)     Glucose 153 (*)     All other components within normal limits   B-TYPE NATRIURETIC PEPTIDE - Abnormal; Notable for the following components:    BNP 2,066 (*)     All other components within normal limits   LACTIC ACID, PLASMA - Abnormal; Notable for the following components:    Lactate (Lactic Acid) 3.9 (*)     All other components within normal limits    Narrative:      La critical result(s) called and verbal readback obtained from Helen Cerna RN by SMITHA 05/26/2020 17:48   ISTAT PROCEDURE - Abnormal; Notable for the following components:    POC PH 7.179 (*)     POC PCO2 71.5 (*)     All other components within normal limits   ISTAT PROCEDURE - Abnormal; Notable for the following components:    POC PH 7.277 (*)     POC PCO2 55.7 (*)     POC SATURATED O2 94 (*)     All other components within normal limits   CULTURE, BLOOD   CULTURE, BLOOD   TROPONIN I   SARS-COV-2 RNA AMPLIFICATION, QUAL   D DIMER, QUANTITATIVE   TROPONIN I   D DIMER, QUANTITATIVE   TROPONIN I        All Lab Results:  Results for orders placed or performed during the hospital encounter of 05/26/20   CBC auto differential   Result Value Ref Range    WBC 12.44 3.90 - 12.70 K/uL    RBC 5.55 (H) 4.00 - 5.40 M/uL    Hemoglobin 18.0 (H) 12.0 - 16.0 g/dL    Hematocrit 57.3 (H) 37.0 - 48.5 %    Mean Corpuscular Volume 103 (H) 82 - 98 fL    Mean Corpuscular Hemoglobin 32.4 (H) 27.0 - 31.0 pg    Mean Corpuscular Hemoglobin Conc 31.4 (L) 32.0 - 36.0 g/dL    RDW 13.2 11.5 - 14.5 %    Platelets 197 150 - 350 K/uL    MPV 10.6 9.2 - 12.9 fL    Immature Granulocytes 0.3 0.0 - 0.5 %    Gran # (ANC) 7.2 1.8 - 7.7 K/uL    Immature Grans (Abs) 0.04 0.00 - 0.04 K/uL    Lymph # 4.0 1.0 - 4.8 K/uL    Mono # 0.8 0.3 - 1.0 K/uL    Eos # 0.4 0.0 - 0.5 K/uL    Baso # 0.06 0.00 - 0.20 K/uL    nRBC 0 0 /100 WBC    Gran% 58.0 38.0 - 73.0 %    Lymph% 32.2  18.0 - 48.0 %    Mono% 6.2 4.0 - 15.0 %    Eosinophil% 2.8 0.0 - 8.0 %    Basophil% 0.5 0.0 - 1.9 %    Differential Method Automated    Comprehensive metabolic panel   Result Value Ref Range    Sodium 140 136 - 145 mmol/L    Potassium 4.6 3.5 - 5.1 mmol/L    Chloride 105 95 - 110 mmol/L    CO2 22 (L) 23 - 29 mmol/L    Glucose 153 (H) 70 - 110 mg/dL    BUN, Bld 12 6 - 20 mg/dL    Creatinine 0.9 0.5 - 1.4 mg/dL    Calcium 9.2 8.7 - 10.5 mg/dL    Total Protein 8.1 6.0 - 8.4 g/dL    Albumin 4.3 3.5 - 5.2 g/dL    Total Bilirubin 0.9 0.1 - 1.0 mg/dL    Alkaline Phosphatase 127 55 - 135 U/L    AST 40 10 - 40 U/L    ALT 30 10 - 44 U/L    Anion Gap 13 8 - 16 mmol/L    eGFR if African American >60 >60 mL/min/1.73 m^2    eGFR if non African American >60 >60 mL/min/1.73 m^2   Troponin I #1   Result Value Ref Range    Troponin I 0.007 0.000 - 0.026 ng/mL   B-Type natriuretic peptide (BNP)   Result Value Ref Range    BNP 2,066 (H) 0 - 99 pg/mL   COVID-19 Rapid Screening   Result Value Ref Range    SARS-CoV-2 RNA, Amplification, Qual Negative Negative   Lactic acid, plasma   Result Value Ref Range    Lactate (Lactic Acid) 3.9 (HH) 0.5 - 2.2 mmol/L   ISTAT PROCEDURE   Result Value Ref Range    POC PH 7.179 (LL) 7.35 - 7.45    POC PCO2 71.5 (HH) 35 - 45 mmHg    POC PO2 94 80 - 100 mmHg    POC HCO3 26.6 24 - 28 mmol/L    POC BE -2 -2 to 2 mmol/L    POC SATURATED O2 95 95 - 100 %    Rate 12     Sample ARTERIAL     Site RR     Allens Test Pass     DelSys CPAP/BiPAP     Mode BiPAP     FiO2 80     IP 12     EP 6    ISTAT PROCEDURE   Result Value Ref Range    POC PH 7.277 (LL) 7.35 - 7.45    POC PCO2 55.7 (H) 35 - 45 mmHg    POC PO2 83 80 - 100 mmHg    POC HCO3 26.0 24 - 28 mmol/L    POC BE -1 -2 to 2 mmol/L    POC SATURATED O2 94 (L) 95 - 100 %    Rate 25     Sample ARTERIAL     Site RR     Allens Test Pass     DelSys CPAP/BiPAP     Mode AVAPS     Vt 350     FiO2 65        Imaging Results:  Imaging Results          X-Ray Chest AP  Portable (Final result)  Result time 05/26/20 16:58:37    Final result by Cooper Oswald MD (05/26/20 16:58:37)                 Impression:      Cardiomegaly and mild perihilar vascular congestion and edema.      Electronically signed by: Cooper Oswald  Date:    05/26/2020  Time:    16:58             Narrative:    EXAMINATION:  XR CHEST AP PORTABLE    CLINICAL HISTORY:  Chest Pain;    TECHNIQUE:  Single frontal view of the chest was performed.    COMPARISON:  Chest radiograph 05/31/2013; CTA chest 05/31/2013    FINDINGS:  Cardiac leads project over the chest.  Cardiomediastinal silhouette is enlarged, similar to the prior.  Mild perihilar vascular congestion with mild edema.  Hyperinflation of both lungs and prominence of the bronchovascular markings concerning for COPD.  Flattening of the diaphragms and chronic blunting of the bilateral costophrenic angles.  No pneumothorax.  Osseous structures appear intact.                                 The EKG was ordered, reviewed, and independently interpreted by the ED provider.  Interpretation time: 1700  Rate: 108 BPM  Rhythm: sinus tachycardia  Interpretation: Biatrial enlargement. LVH with QRS widening and repolarization abnormality. No STEMI.         The Emergency Provider reviewed the vital signs and test results, which are outlined above.     ED Discussion     4:59 PM: Pt was moved to room 5 immediately. Respiratory was called and pt was put on BiPAP.    5:42 PM: Re-evaluated pt. Pt states that she is feeling better but still has trouble breathing. Pt has retractions, through not as severe as before. Clinically, pt's condition appears to have improved. ABG pending. Will continue to closely monitor pt. D/w pt all pertinent results. D/w pt any concerns expressed at this time. Answered all questions. Pt expresses understanding at this time.    6:28 PM: Pt states she's feeling much better. Pt changed to AVAPS and is much more comfortable. ABG noted.    7:14 PM:  Re-evaluated pt. Pt is resting comfortably and is in no acute distress. Pt is doing very well on AVAPS. Pt no longer has retractions and is able to talk in full sentences now.  D/w pt all pertinent results. D/w pt any concerns expressed at this time. Answered all questions. Pt expresses understanding at this time.    7:26 PM: Discussed case with Stanley Bunn MD ([facility]). Dr. Bunn agrees with current care and management of pt and accepts admission.   Admitting Service: Hospital Medicine  Admitting Physician: Dr. Bunn  Admit to: IP/ICU    7:28 PM: Re-evaluated pt. I have discussed test results, shared treatment plan, and the need for admission with patient at bedside. Pt expresses understanding at this time and agrees with all information. All questions answered. Pt has no further questions or concerns at this time. Pt is ready for admit.       Medical Decision Making:   Clinical Tests:   Lab Tests: Ordered and Reviewed  Radiological Study: Ordered and Reviewed  Medical Tests: Ordered and Reviewed           ED Medication(s):  Medications   nitroGLYCERIN 50 mg in dextrose 5 % 250 mL infusion (TITRATING) (70 mcg/min Intravenous Rate/Dose Change 5/26/20 1920)   aspirin tablet 325 mg (325 mg Oral Given 5/26/20 1705)   methylPREDNISolone sodium succinate injection 125 mg (125 mg Intravenous Given 5/26/20 1650)   albuterol-ipratropium 2.5 mg-0.5 mg/3 mL nebulizer solution 3 mL (3 mLs Nebulization Given 5/26/20 1655)   nitroGLYCERIN 2% TD oint ointment 1 inch (1 inch Transdermal Given 5/26/20 1654)   levoFLOXacin 750 mg/150 mL IVPB 750 mg (0 mg Intravenous Stopped 5/26/20 1846)   furosemide injection 40 mg (40 mg Intravenous Given 5/26/20 1754)       New Prescriptions    No medications on file               Scribe Attestation:   Scribe #1: I performed the above scribed service and the documentation accurately describes the services I performed. I attest to the accuracy of the note.     Attending:   Physician Attestation  Statement for Scribe #1: I, Julianna Jin Do, MD, personally performed the services described in this documentation, as scribed by Best Duran, in my presence, and it is both accurate and complete.           Clinical Impression       ICD-10-CM ICD-9-CM   1. COPD exacerbation J44.1 491.21   2. Chest pain R07.9 786.50   3. COPD with acute exacerbation J44.1 491.21   4. Acute on chronic congestive heart failure, unspecified heart failure type I50.9 428.0   5. Acute respiratory failure with hypoxia and hypercapnia J96.01 518.81    J96.02        Disposition:   Disposition: Admitted  Condition: Serious       Julianna Jin Do, MD  05/26/20 1931

## 2020-05-26 NOTE — PROGRESS NOTES
"Chief complaint/reason for visit:  congestion, postnasal drip, SOB, fatigue     HISTORY OF PRESENT ILLNESS:   56 y/o female complains of  congestion, postnasal drip, SOB, fatigue, non productive cough onset last night.  Complains of sensation in chest on coughing and deep inspiration.  Denies seeking emergency room treatment.  Discussed with patient need for further evaluation at [facility].  Discussed elevated blood pressure and medications.  Patient admits takes medications every day.  Discussed options for Urgent Care to perform COVID-19 swab, takes 24-48 hours maybe longer to get results.  Discussed ER with need to swab further evaluation with lab work, EKG, x-rays.  Patient complains cough worse at night.  Denies trying any OTC medications.  Denies dizziness, blurred vision, nausea, vomiting, diarrhea, " aching all over", loss of appetite.  Discussed transportation to [facility] per EMS/patient deferred.  Patient admits will drive herself to ER         Past Medical History:   Diagnosis Date    Cardiomyopathy     CHF (congestive heart failure)     Hypertension        Past Surgical History:   Procedure Laterality Date    JOINT REPLACEMENT              Family History   Problem Relation Age of Onset    Heart failure Mother     Hypertension Brother             Social History     Socioeconomic History    Marital status: Single     Spouse name: Not on file    Number of children: Not on file    Years of education: Not on file    Highest education level: Not on file   Occupational History    Not on file   Social Needs    Financial resource strain: Not on file    Food insecurity:     Worry: Not on file     Inability: Not on file    Transportation needs:     Medical: Not on file     Non-medical: Not on file   Tobacco Use    Smoking status: Current Every Day Smoker     Packs/day: 0.25     Years: 32.00     Pack years: 8.00    Smokeless tobacco: Never Used   Substance and Sexual Activity    Alcohol use: Yes     " Comment: Occassionally    Drug use: No    Sexual activity: Not on file   Lifestyle    Physical activity:     Days per week: Not on file     Minutes per session: Not on file    Stress: Not on file   Relationships    Social connections:     Talks on phone: Not on file     Gets together: Not on file     Attends [employer] service: Not on file     Active member of club or organization: Not on file     Attends meetings of clubs or organizations: Not on file     Relationship status: Not on file   Other Topics Concern    Not on file   Social History Narrative    Not on file       ROS:  GENERAL: Reports fatigue.   SKIN: No rashes, itching or changes in color or texture of skin.  HEENT: Reports congestion,  hoarseness.   NODES: No masses or lesions. Denies swollen glands.  CHEST: Reports nonproductive productive cough. & wheezing  CARDIOVASCULAR: shortness of breath  ABDOMEN: Appetite fair, No weight loss.  MUSCULOSKELETAL: reports no back pain.  NEUROLOGIC: No history of seizures, paralysis, alteration of gait or coordination.  PSYCHIATRIC: Rosalio mood swings, depression.    PE:   APPEARANCE: Well nourished, well developed, in moderate distress, pulse ox 92% /127, tobacco odor noted  V/S: Reviewed.  SKIN: Normal skin turgor, no lesions.  HEENT: Turbinates red, Minimal red pharynx, TM's poor light reflex bilateral. No facial tenderness  CHEST:  Very minimal expiratory wheezing  on cough/ auscultation.  CARDIOVASCULAR: Regular rate and rhythm.   MUSCULOSKELETAL: FULLER without difficulty  NEUROLOGIC: No sensory deficits. Gait & Posture: normal, No cerebellar signs.  MENTAL STATUS: Patient alert, oriented x 3 & conversant.    PLAN: CXR, COVID-19 / patient deferred   Advise go to ER for further evaluation  Discussed EMS ambulance/patient deferred  Patient admits will go to E.R. per car  Discussed smoking cessation.  Cool mist humidifier/vaporizer.  Practice good handwashing...  Tylenol  for fever, headache and body  aches.  Advise follow up with PCP in 2-3 days for recheck  Advise go to ER if symptoms worsen or fail to improve with treatment.  AVS provided and reviewed with patient including supportive care, follow up, and red flag symptoms.   Patient verbalizes understanding and agrees with treatment plan. Discharged from Urgent Care in stable condition.      DIAGNOSIS:  SOB  Fatigue  Hypertension  Tobacco use disorder

## 2020-05-26 NOTE — PATIENT INSTRUCTIONS
PLAN: CXR, /deferred   Advise go to ER for further evaluate  Discussed EMS ambulance/patient deferred  Patient admits will go to E.R. per car  Discussed smoking cessation.  Cool mist humidifier/vaporizer.  Practice good handwashing...  Tylenol  for fever, headache and body aches.  Advise follow up with PCP in 2-3 days for recheck  Advise go to ER if symptoms worsen or fail to improve with treatment.  AVS provided and reviewed with patient including supportive care, follow up, and red flag symptoms.   Patient verbalizes understanding and agrees with treatment plan. Discharged from Urgent Care in stable condition.

## 2020-05-27 PROBLEM — M17.10 ARTHRITIS OF KNEE: Status: ACTIVE | Noted: 2017-04-06

## 2020-05-27 PROBLEM — I50.9 CHF (CONGESTIVE HEART FAILURE): Status: ACTIVE | Noted: 2020-05-27

## 2020-05-27 PROBLEM — I50.9 ACUTE EXACERBATION OF CHF (CONGESTIVE HEART FAILURE): Status: RESOLVED | Noted: 2020-05-26 | Resolved: 2020-05-27

## 2020-05-27 PROBLEM — R79.89 ELEVATED TROPONIN: Status: ACTIVE | Noted: 2020-05-27

## 2020-05-27 PROBLEM — I50.43 ACUTE ON CHRONIC COMBINED SYSTOLIC AND DIASTOLIC HEART FAILURE: Status: ACTIVE | Noted: 2020-05-27

## 2020-05-27 LAB
ALBUMIN SERPL BCP-MCNC: 3.9 G/DL (ref 3.5–5.2)
ALP SERPL-CCNC: 96 U/L (ref 55–135)
ALT SERPL W/O P-5'-P-CCNC: 32 U/L (ref 10–44)
ANION GAP SERPL CALC-SCNC: 10 MMOL/L (ref 8–16)
ANION GAP SERPL CALC-SCNC: 10 MMOL/L (ref 8–16)
AORTIC ROOT ANNULUS: 3.42 CM
ASCENDING AORTA: 3.28 CM
AST SERPL-CCNC: 26 U/L (ref 10–40)
AV INDEX (PROSTH): 0.68
AV MEAN GRADIENT: 7 MMHG
AV PEAK GRADIENT: 13 MMHG
AV VALVE AREA: 2.37 CM2
AV VELOCITY RATIO: 0.62
BASOPHILS # BLD AUTO: 0.01 K/UL (ref 0–0.2)
BASOPHILS NFR BLD: 0.2 % (ref 0–1.9)
BILIRUB SERPL-MCNC: 0.8 MG/DL (ref 0.1–1)
BSA FOR ECHO PROCEDURE: 1.69 M2
BUN SERPL-MCNC: 15 MG/DL (ref 6–20)
BUN SERPL-MCNC: 19 MG/DL (ref 6–20)
CALCIUM SERPL-MCNC: 8.7 MG/DL (ref 8.7–10.5)
CALCIUM SERPL-MCNC: 9.3 MG/DL (ref 8.7–10.5)
CHLORIDE SERPL-SCNC: 100 MMOL/L (ref 95–110)
CHLORIDE SERPL-SCNC: 101 MMOL/L (ref 95–110)
CO2 SERPL-SCNC: 27 MMOL/L (ref 23–29)
CO2 SERPL-SCNC: 30 MMOL/L (ref 23–29)
CREAT SERPL-MCNC: 0.7 MG/DL (ref 0.5–1.4)
CREAT SERPL-MCNC: 0.9 MG/DL (ref 0.5–1.4)
CV ECHO LV RWT: 0.51 CM
D DIMER PPP IA.FEU-MCNC: 0.7 MG/L FEU
DIFFERENTIAL METHOD: ABNORMAL
DOP CALC AO PEAK VEL: 1.77 M/S
DOP CALC AO VTI: 32 CM
DOP CALC LVOT AREA: 3.5 CM2
DOP CALC LVOT DIAMETER: 2.1 CM
DOP CALC LVOT PEAK VEL: 1.09 M/S
DOP CALC LVOT STROKE VOLUME: 75.85 CM3
DOP CALCLVOT PEAK VEL VTI: 21.91 CM
E WAVE DECELERATION TIME: 307.98 MSEC
E/A RATIO: 0.81
E/E' RATIO: 14.46 M/S
ECHO LV POSTERIOR WALL: 1.35 CM (ref 0.6–1.1)
EOSINOPHIL # BLD AUTO: 0 K/UL (ref 0–0.5)
EOSINOPHIL NFR BLD: 0 % (ref 0–8)
ERYTHROCYTE [DISTWIDTH] IN BLOOD BY AUTOMATED COUNT: 12.8 % (ref 11.5–14.5)
EST. GFR  (AFRICAN AMERICAN): >60 ML/MIN/1.73 M^2
EST. GFR  (AFRICAN AMERICAN): >60 ML/MIN/1.73 M^2
EST. GFR  (NON AFRICAN AMERICAN): >60 ML/MIN/1.73 M^2
EST. GFR  (NON AFRICAN AMERICAN): >60 ML/MIN/1.73 M^2
FRACTIONAL SHORTENING: 20 % (ref 28–44)
GLUCOSE SERPL-MCNC: 114 MG/DL (ref 70–110)
GLUCOSE SERPL-MCNC: 130 MG/DL (ref 70–110)
HCT VFR BLD AUTO: 47.5 % (ref 37–48.5)
HGB BLD-MCNC: 15.4 G/DL (ref 12–16)
IMM GRANULOCYTES # BLD AUTO: 0.02 K/UL (ref 0–0.04)
IMM GRANULOCYTES NFR BLD AUTO: 0.4 % (ref 0–0.5)
INTERVENTRICULAR SEPTUM: 1.37 CM (ref 0.6–1.1)
IVRT: 97.05 MSEC
LA MAJOR: 6.38 CM
LA MINOR: 3.71 CM
LACTATE SERPL-SCNC: 1.2 MMOL/L (ref 0.5–2.2)
LEFT ATRIUM SIZE: 4.12 CM
LEFT INTERNAL DIMENSION IN SYSTOLE: 4.22 CM (ref 2.1–4)
LEFT VENTRICLE DIASTOLIC VOLUME INDEX: 80.44 ML/M2
LEFT VENTRICLE DIASTOLIC VOLUME: 134.48 ML
LEFT VENTRICLE MASS INDEX: 182 G/M2
LEFT VENTRICLE SYSTOLIC VOLUME INDEX: 47.5 ML/M2
LEFT VENTRICLE SYSTOLIC VOLUME: 79.37 ML
LEFT VENTRICULAR INTERNAL DIMENSION IN DIASTOLE: 5.29 CM (ref 3.5–6)
LEFT VENTRICULAR MASS: 305.02 G
LV LATERAL E/E' RATIO: 13.43 M/S
LV SEPTAL E/E' RATIO: 15.67 M/S
LYMPHOCYTES # BLD AUTO: 0.4 K/UL (ref 1–4.8)
LYMPHOCYTES NFR BLD: 6.8 % (ref 18–48)
MAGNESIUM SERPL-MCNC: 1.5 MG/DL (ref 1.6–2.6)
MAGNESIUM SERPL-MCNC: 2.1 MG/DL (ref 1.6–2.6)
MCH RBC QN AUTO: 32.2 PG (ref 27–31)
MCHC RBC AUTO-ENTMCNC: 32.4 G/DL (ref 32–36)
MCV RBC AUTO: 99 FL (ref 82–98)
MONOCYTES # BLD AUTO: 0 K/UL (ref 0.3–1)
MONOCYTES NFR BLD: 0.6 % (ref 4–15)
MV PEAK A VEL: 1.16 M/S
MV PEAK E VEL: 0.94 M/S
NEUTROPHILS # BLD AUTO: 4.7 K/UL (ref 1.8–7.7)
NEUTROPHILS NFR BLD: 92 % (ref 38–73)
NRBC BLD-RTO: 0 /100 WBC
PHOSPHATE SERPL-MCNC: 3.7 MG/DL (ref 2.7–4.5)
PLATELET # BLD AUTO: 170 K/UL (ref 150–350)
PMV BLD AUTO: 10.8 FL (ref 9.2–12.9)
POTASSIUM SERPL-SCNC: 3.6 MMOL/L (ref 3.5–5.1)
POTASSIUM SERPL-SCNC: 4.5 MMOL/L (ref 3.5–5.1)
PROT SERPL-MCNC: 7.3 G/DL (ref 6–8.4)
RA MAJOR: 3.84 CM
RA PRESSURE: 3 MMHG
RBC # BLD AUTO: 4.79 M/UL (ref 4–5.4)
SINUS: 3.68 CM
SODIUM SERPL-SCNC: 137 MMOL/L (ref 136–145)
SODIUM SERPL-SCNC: 141 MMOL/L (ref 136–145)
STJ: 3.51 CM
TDI LATERAL: 0.07 M/S
TDI SEPTAL: 0.06 M/S
TDI: 0.07 M/S
TRICUSPID ANNULAR PLANE SYSTOLIC EXCURSION: 2.23 CM
TROPONIN I SERPL DL<=0.01 NG/ML-MCNC: 0.02 NG/ML (ref 0–0.03)
WBC # BLD AUTO: 5.13 K/UL (ref 3.9–12.7)

## 2020-05-27 PROCEDURE — 99291 CRITICAL CARE FIRST HOUR: CPT | Mod: ,,, | Performed by: INTERNAL MEDICINE

## 2020-05-27 PROCEDURE — 80053 COMPREHEN METABOLIC PANEL: CPT

## 2020-05-27 PROCEDURE — 94761 N-INVAS EAR/PLS OXIMETRY MLT: CPT

## 2020-05-27 PROCEDURE — 63700000 PHARM REV CODE 250 ALT 637 W/O HCPCS: Performed by: INTERNAL MEDICINE

## 2020-05-27 PROCEDURE — 84484 ASSAY OF TROPONIN QUANT: CPT

## 2020-05-27 PROCEDURE — 99223 1ST HOSP IP/OBS HIGH 75: CPT | Mod: 25,,, | Performed by: INTERNAL MEDICINE

## 2020-05-27 PROCEDURE — 63700000 PHARM REV CODE 250 ALT 637 W/O HCPCS: Performed by: FAMILY MEDICINE

## 2020-05-27 PROCEDURE — 99291 PR CRITICAL CARE, E/M 30-74 MINUTES: ICD-10-PCS | Mod: ,,, | Performed by: INTERNAL MEDICINE

## 2020-05-27 PROCEDURE — 99223 PR INITIAL HOSPITAL CARE,LEVL III: ICD-10-PCS | Mod: 25,,, | Performed by: INTERNAL MEDICINE

## 2020-05-27 PROCEDURE — 63600175 PHARM REV CODE 636 W HCPCS: Performed by: PHYSICIAN ASSISTANT

## 2020-05-27 PROCEDURE — 85379 FIBRIN DEGRADATION QUANT: CPT

## 2020-05-27 PROCEDURE — 80048 BASIC METABOLIC PNL TOTAL CA: CPT

## 2020-05-27 PROCEDURE — 84100 ASSAY OF PHOSPHORUS: CPT

## 2020-05-27 PROCEDURE — 85025 COMPLETE CBC W/AUTO DIFF WBC: CPT

## 2020-05-27 PROCEDURE — 63600175 PHARM REV CODE 636 W HCPCS: Performed by: FAMILY MEDICINE

## 2020-05-27 PROCEDURE — 21400001 HC TELEMETRY ROOM

## 2020-05-27 PROCEDURE — 97530 THERAPEUTIC ACTIVITIES: CPT

## 2020-05-27 PROCEDURE — 63600175 PHARM REV CODE 636 W HCPCS: Performed by: INTERNAL MEDICINE

## 2020-05-27 PROCEDURE — 25000003 PHARM REV CODE 250: Performed by: FAMILY MEDICINE

## 2020-05-27 PROCEDURE — 97116 GAIT TRAINING THERAPY: CPT

## 2020-05-27 PROCEDURE — 94640 AIRWAY INHALATION TREATMENT: CPT

## 2020-05-27 PROCEDURE — 25000003 PHARM REV CODE 250: Performed by: INTERNAL MEDICINE

## 2020-05-27 PROCEDURE — 27000221 HC OXYGEN, UP TO 24 HOURS

## 2020-05-27 PROCEDURE — 83605 ASSAY OF LACTIC ACID: CPT

## 2020-05-27 PROCEDURE — 97161 PT EVAL LOW COMPLEX 20 MIN: CPT

## 2020-05-27 PROCEDURE — 25000242 PHARM REV CODE 250 ALT 637 W/ HCPCS: Performed by: INTERNAL MEDICINE

## 2020-05-27 PROCEDURE — 83735 ASSAY OF MAGNESIUM: CPT

## 2020-05-27 PROCEDURE — 25000242 PHARM REV CODE 250 ALT 637 W/ HCPCS: Performed by: FAMILY MEDICINE

## 2020-05-27 PROCEDURE — 97165 OT EVAL LOW COMPLEX 30 MIN: CPT

## 2020-05-27 PROCEDURE — 36415 COLL VENOUS BLD VENIPUNCTURE: CPT

## 2020-05-27 RX ORDER — FAMOTIDINE 20 MG/1
20 TABLET, FILM COATED ORAL DAILY
Status: DISCONTINUED | OUTPATIENT
Start: 2020-05-27 | End: 2020-05-28 | Stop reason: DRUGHIGH

## 2020-05-27 RX ORDER — POTASSIUM CHLORIDE 20 MEQ/15ML
20 SOLUTION ORAL ONCE
Status: COMPLETED | OUTPATIENT
Start: 2020-05-27 | End: 2020-05-27

## 2020-05-27 RX ORDER — LISINOPRIL 10 MG/1
10 TABLET ORAL DAILY
Status: DISCONTINUED | OUTPATIENT
Start: 2020-05-27 | End: 2020-05-28 | Stop reason: HOSPADM

## 2020-05-27 RX ORDER — FUROSEMIDE 10 MG/ML
20 INJECTION INTRAMUSCULAR; INTRAVENOUS ONCE
Status: COMPLETED | OUTPATIENT
Start: 2020-05-27 | End: 2020-05-27

## 2020-05-27 RX ORDER — MAGNESIUM SULFATE HEPTAHYDRATE 40 MG/ML
2 INJECTION, SOLUTION INTRAVENOUS ONCE
Status: COMPLETED | OUTPATIENT
Start: 2020-05-27 | End: 2020-05-27

## 2020-05-27 RX ADMIN — POTASSIUM CHLORIDE 20 MEQ: 20 SOLUTION ORAL at 09:05

## 2020-05-27 RX ADMIN — AMIODARONE HYDROCHLORIDE 200 MG: 200 TABLET ORAL at 09:05

## 2020-05-27 RX ADMIN — ENOXAPARIN SODIUM 40 MG: 100 INJECTION SUBCUTANEOUS at 04:05

## 2020-05-27 RX ADMIN — FUROSEMIDE 20 MG: 20 TABLET ORAL at 09:05

## 2020-05-27 RX ADMIN — MAGNESIUM SULFATE HEPTAHYDRATE 2 G: 40 INJECTION, SOLUTION INTRAVENOUS at 09:05

## 2020-05-27 RX ADMIN — FUROSEMIDE 20 MG: 10 INJECTION, SOLUTION INTRAMUSCULAR; INTRAVENOUS at 04:05

## 2020-05-27 RX ADMIN — IPRATROPIUM BROMIDE AND ALBUTEROL SULFATE 3 ML: .5; 3 SOLUTION RESPIRATORY (INHALATION) at 01:05

## 2020-05-27 RX ADMIN — CARVEDILOL 25 MG: 12.5 TABLET, FILM COATED ORAL at 09:05

## 2020-05-27 RX ADMIN — LISINOPRIL 10 MG: 10 TABLET ORAL at 09:05

## 2020-05-27 RX ADMIN — ASPIRIN 81 MG: 81 TABLET, COATED ORAL at 09:05

## 2020-05-27 RX ADMIN — PREDNISONE 20 MG: 20 TABLET ORAL at 09:05

## 2020-05-27 RX ADMIN — AZITHROMYCIN MONOHYDRATE 250 MG: 250 TABLET ORAL at 09:05

## 2020-05-27 RX ADMIN — IPRATROPIUM BROMIDE AND ALBUTEROL SULFATE 3 ML: .5; 3 SOLUTION RESPIRATORY (INHALATION) at 07:05

## 2020-05-27 RX ADMIN — FAMOTIDINE 20 MG: 20 TABLET ORAL at 09:05

## 2020-05-27 NOTE — PT/OT/SLP EVAL
Physical Therapy Evaluation and Discharge Note    Patient Name:  Melissa Petty   MRN:  [id number]    Recommendations:     Discharge Recommendations:  home   Discharge Equipment Recommendations: none   Barriers to discharge: None    Assessment:     Melissa Petty is a 55 y.o. female admitted with a medical diagnosis of Acute exacerbation of CHF (congestive heart failure). .  At this time, patient is functioning at their prior level of function and does not require further acute PT services.     Recent Surgery: * No surgery found *    Plan:     During this hospitalization, patient does not require further acute PT services.  Please re-consult if situation changes.      Subjective     Chief Complaint:   Patient/Family Comments/goals:   Pain/Comfort:  · Pain Rating 1: 0/10    Patients cultural, spiritual, Mu-ism conflicts given the current situation:      Living Environment:  PT LIVES ALONE BUT SON STAYS WITH HER SOME TIMES, 1 STORY HOUSE NO STEPS, AMB INDEP COMMUNITY DISTANCES, STILL WORKS AND DRIVES, INDEP WITH ADL'S  Prior to admission, patients level of function was INDEP.  Equipment used at home: none.  DME owned (not currently used): none.  Upon discharge, patient will have assistance from SON.    Objective:     Communicated with NURSE DIAMOND prior to session.  Patient found supine with pulse ox (continuous), telemetry, peripheral IV, oxygen, blood pressure cuff, vazquez catheter upon PT entry to room.    General Precautions: Standard   Orthopedic Precautions:N/A   Braces: N/A     Exams:  · Cognitive Exam:  Patient is oriented to Person, Place, Time and Situation  · Postural Exam:  Patient presented with the following abnormalities:    · -       No postural abnormalities identified  · Sensation:    · -       Intact  · RLE ROM: WNL  · RLE Strength: WNL  · LLE ROM: WNL  · LLE Strength: WNL    Functional Mobility:  · Bed Mobility:     · Rolling Left:  modified independence  · Scooting: modified  independence  · Supine to Sit: modified independence  · Transfers:     · Sit to Stand:  modified independence with no AD  · Bed to Chair: modified independence with  no AD  using  Step Transfer  · Gait: PT ' NO AD WITH SBA, NO LOB OR SOB WITH O2 IN TOW  · Balance: GOOD    AM-PAC 6 CLICK MOBILITY  Total Score:21     Therapeutic Activities and Exercises:   PT EDUCATED IN ROLE OF P.T., PT EDUCATED IN BLE THEREX TO PERFORM WHILE SEATED IN CHAIR    AM-PAC 6 CLICK MOBILITY  Total Score:21     Patient left up in chair with all lines intact, call button in reach and NURSE notified.    GOALS:   Multidisciplinary Problems     Physical Therapy Goals     Not on file                History:     Past Medical History:   Diagnosis Date    Cardiomyopathy     CHF (congestive heart failure)     COPD exacerbation 5/26/2020    Hypertension        Past Surgical History:   Procedure Laterality Date    JOINT REPLACEMENT         Time Tracking:     PT Received On: 05/27/20  PT Start Time: 1130     PT Stop Time: 1155  PT Total Time (min): 25 min     Billable Minutes: Evaluation 15 and Gait Training 10    Osiris Macedo, PT  05/27/2020

## 2020-05-27 NOTE — ASSESSMENT & PLAN NOTE
5/26:  Patient denies history of COPD  However given long history of tobacco abuse  And hypercapnia noted on ABG, patient likely has undiagnosed COPD  Will need outpatient follow-up with pulmonology  Was started on Levaquin and Solu-Medrol in the ED  Will check procalcitonin  Will continue to treat COPD exacerbation  With azithromycin and prednisone  DuoNebs q.6 while awake

## 2020-05-27 NOTE — ED NOTES
Pt sitting upright in bed, with AVAPs in place. Pt with increased respirations and no increased work of breathing. Pt states she feels much better than she did when she got here. Pt with no current complaint and is aware of plan to admit. Will continue to monitor. Bed in low position, side rails up, call light in reach.

## 2020-05-27 NOTE — HPI
Ms. Petty is a 55 year old female patient whose current medical conditions include systolic CHF/dilated NICM, tobacco abuse, and frequent PVC's who presented to [facility] ED yesterday with a chief complaint of worsening SOB that onset the prior evening. Associated symptoms included orthopnea and BLE edema. Patient denied any associated fever, chills, chest pain, PND, palpitations, near syncope, or syncope. Initial workup revealed BNP > 2000, lactic acid of 3.9, and elevated BP (190/130). CXR showed findings consistent with pulmonary edema/vascular congestion. Patient was noted to be in respiratory distress with hypercapnia and was subsequently admitted to ICU on AVAPS. Cardiology consulted to assist with management. Patient seen and examined today. Feeling better, SOB improved. Admits to indulging in salty foods and states she missed some doses of her medications. Excellent diuresis since admission. Chart reviewed.  Echo 1/13/2020 showed normal EF, no WMA. Repeat pending. Prior MPI stress test in 10/19 negative for ischemia/injury. Troponin 0.007>0.029.

## 2020-05-27 NOTE — ED NOTES
Upon attempting to transport upstairs, pt had a sudden onset of nausea with dry heaving. Zofran administered. Dr Bunn notified.

## 2020-05-27 NOTE — HPI
Patient is a 55 y.o.  female with a PMHx of cardiomyopathy, CHF, PVC and HTN who presents to the Emergency Department for evaluation of shortness of breath which onset gradually last night.  Patient reports the symptoms have progressively gotten worse.  Denies any chest pain, fever or cough.  Associated symptoms include orthopnea, lower extremity edema.  Reports being compliant with medications.  Patient reveals that she has been smoking 1 pack per day for 20-30 years.  She is a full code and surrogate decision maker is her son.    In the ED, patient presenting with respiratory distress and hypercapnia noted on ABG.  She was initially started on BiPAP however was switched to AVAPS.  Highest blood pressure recorded was 190/130 and pulmonary congestion noted on chest x-ray.  She was started on nitroglycerin drip.  Patient was given 3 rounds of breathing treatments as started on Levaquin and Solu-Medrol. Patient was admitted to ICU for further monitoring.     Chief Complaint   Patient presents with    Shortness of Breath     started last night, sent over by urgent care.         Past Medical History:   Diagnosis Date    Cardiomyopathy     CHF (congestive heart failure)     COPD exacerbation 5/26/2020    Hypertension

## 2020-05-27 NOTE — ASSESSMENT & PLAN NOTE
Assistance with smoking cessation was offered, including:  []  Medications  [x]  Counseling  []  Printed Information on Smoking Cessation  []  Referral to a Smoking Cessation Program    Patient was counseled regarding smoking for 3-10 minutes.

## 2020-05-27 NOTE — ASSESSMENT & PLAN NOTE
-BP initially 190/130  -Improved since admission  -Continue Coreg, ACEi  -Titrate and adjust meds accordingly

## 2020-05-27 NOTE — ED NOTES
Notified hospital medicine, Dr Bunn of trial off AVAPS and asked if vapotherm may benefit pt. Dr Bunn instructs to do a trial with vapotherm and update him on pt status.

## 2020-05-27 NOTE — ASSESSMENT & PLAN NOTE
5/26:  BP > 180/120 with pulmonary congestion noted  Was started on nitro glycerin drip  Continue weaning drip as tolerated  Hydralazine p.r.n.  Resume home BP meds

## 2020-05-27 NOTE — PT/OT/SLP EVAL
Occupational Therapy   Evaluation and Discharge Note    Name: Melissa Petty  MRN: [id number]  Admitting Diagnosis:  Acute exacerbation of CHF (congestive heart failure)      Recommendations:     Discharge Recommendations: home  Discharge Equipment Recommendations:  none  Barriers to discharge:       Assessment:     Melissa Petty is a 55 y.o. female with a medical diagnosis of Acute exacerbation of CHF (congestive heart failure). At this time, patient is functioning at their prior level of function and does not require further acute OT services.     Plan:     During this hospitalization, patient does not require further acute OT services.  Please re-consult if situation changes.    · Plan of Care Reviewed with:     · PT PLACED ON PEOPLE 'S PROGRAM    Subjective     Chief Complaint:   Patient/Family Comments/goals:     Occupational Profile:  Living Environment: LIVES ALONE IN 1 STORY HOUSE  Previous level of function: (I) WITH ADL'S AND FUNCTIONAL MOBILITY. PT STILL WORK AND DRIVE  Roles and Routines: OCCUPATIONAL THERAPY  Equipment Used at home:  none  Assistance upon Discharge:     Pain/Comfort:  · Pain Rating 1: 0/10    Patients cultural, spiritual, [employer]alism conflicts given the current situation:      Objective:     Communicated with:  NURSE DIAMOND AND [facility] CHART REVIEW prior to session.  Patient found HOB elevated with peripheral IV, pulse ox (continuous), telemetry, blood pressure cuff, vazquez catheter upon OT entry to room.    General Precautions: Standard, fall   Orthopedic Precautions:N/A   Braces: N/A     Occupational Performance:    Bed Mobility:    · Patient completed Rolling/Turning to Left with  independence  · Patient completed Scooting/Bridging with independence  · Patient completed Supine to Sit with independence    Functional Mobility/Transfers:  · Patient completed Sit <> Stand Transfer with supervision  with  no assistive device   · Patient completed Bed <> Chair Transfer using Stand Pivot  technique with supervision with no assistive device  · Functional Mobility: AMBULATED 8 FEET WITH S AND NO AE      Activities of Daily Living:  · Upper Body Dressing: supervision .  · Lower Body Dressing: supervision .    Cognitive/Visual Perceptual:  Cognitive/Psychosocial Skills:     -       Oriented to: Person, Place, Time and Situation   -       Follows Commands/attention:Follows multistep  commands  -       Communication: clear/fluent  -       Memory: No Deficits noted  -       Safety awareness/insight to disability: intact     Physical Exam:  Upper Extremity Range of Motion:     -       Right Upper Extremity: WFL  -       Left Upper Extremity: WFL  Upper Extremity Strength:    -       Right Upper Extremity: WFL  -       Left Upper Extremity: WFL   Strength:    -       Right Upper Extremity: WFL  -       Left Upper Extremity: WFL    AMPAC 6 Click ADL:  AMPAC Total Score: 24    Treatment & Education:    Education:    Patient left up in chair with all lines intact, call button in reach and NURSE DARA notified    GOALS:   Multidisciplinary Problems     Occupational Therapy Goals     Not on file                History:     Past Medical History:   Diagnosis Date    Cardiomyopathy     CHF (congestive heart failure)     COPD exacerbation 5/26/2020    Hypertension        Past Surgical History:   Procedure Laterality Date    JOINT REPLACEMENT         Time Tracking:     OT Date of Treatment: 05/27/20  OT Start Time: 1046  OT Stop Time: 1110  OT Total Time (min): 24 min    Billable Minutes:Evaluation 10 MINUTES  Therapeutic Activity 14 MINUTES    Ewa Polanco OT  5/27/2020

## 2020-05-27 NOTE — SUBJECTIVE & OBJECTIVE
Past Medical History:   Diagnosis Date    Cardiomyopathy     CHF (congestive heart failure)     COPD exacerbation 5/26/2020    Hypertension        Past Surgical History:   Procedure Laterality Date    JOINT REPLACEMENT         Review of patient's allergies indicates:   Allergen Reactions    Pcn [penicillins]      Unknown reaction         No current facility-administered medications on file prior to encounter.      Current Outpatient Medications on File Prior to Encounter   Medication Sig    amiodarone (PACERONE) 200 MG Tab Take 1 tablet (200 mg total) by mouth once daily.    aspirin (ECOTRIN) 81 MG EC tablet Take 81 mg by mouth.    carvedilol (COREG) 25 MG tablet Take 1 tablet (25 mg total) by mouth 2 (two) times daily with meals.    folic acid (FOLVITE) 800 MCG Tab Take 400 mcg by mouth once daily.    INV lisinopril 10 MG Tab Take 1 tablet (10 mg total) by mouth 2 (two) times daily.    furosemide (LASIX) 20 MG tablet Take 1 tablet (20 mg total) by mouth once daily. PRN    potassium chloride SA (K-DUR,KLOR-CON) 20 MEQ tablet Take 1 tablet (20 mEq total) by mouth daily as needed. PRN, takes with lasix    thiamine (VITAMIN B-1) 100 MG tablet Take 250 mg by mouth once daily.     Family History     Problem Relation (Age of Onset)    Heart failure Mother    Hypertension Brother        Tobacco Use    Smoking status: Current Every Day Smoker     Packs/day: 1.00     Years: 32.00     Pack years: 32.00    Smokeless tobacco: Never Used   Substance and Sexual Activity    Alcohol use: Yes     Comment: Occassionally    Drug use: No    Sexual activity: Not on file     Review of Systems   Constitution: Positive for malaise/fatigue.   HENT: Negative.    Eyes: Negative.    Cardiovascular: Positive for dyspnea on exertion, leg swelling and orthopnea.   Respiratory: Positive for cough and shortness of breath.    Endocrine: Negative.    Hematologic/Lymphatic: Negative.    Skin: Negative.    Musculoskeletal: Negative.     Gastrointestinal: Negative.    Genitourinary: Negative.    Neurological: Negative.    Psychiatric/Behavioral: Negative.    Allergic/Immunologic: Negative.      Objective:     Vital Signs (Most Recent):  Temp: 97.7 °F (36.5 °C) (05/27/20 0701)  Pulse: 70 (05/27/20 0756)  Resp: 16 (05/27/20 0756)  BP: 132/83 (05/27/20 0701)  SpO2: (!) 93 % (05/27/20 0756) Vital Signs (24h Range):  Temp:  [97.1 °F (36.2 °C)-98 °F (36.7 °C)] 97.7 °F (36.5 °C)  Pulse:  [] 70  Resp:  [6-42] 16  SpO2:  [79 %-100 %] 93 %  BP: (113-195)/() 132/83     Weight: 64.4 kg (142 lb)  Body mass index is 25.15 kg/m².    SpO2: (!) 93 %  O2 Device (Oxygen Therapy): nasal cannula      Intake/Output Summary (Last 24 hours) at 5/27/2020 0850  Last data filed at 5/27/2020 0701  Gross per 24 hour   Intake 390 ml   Output 2580 ml   Net -2190 ml       Lines/Drains/Airways     Drain                 Urethral Catheter 05/26/20 1900 Latex 16 Fr. less than 1 day          Peripheral Intravenous Line                 Peripheral IV - Single Lumen 05/26/20 1646 18 G Left Antecubital less than 1 day         Peripheral IV - Single Lumen 05/26/20 1652 20 G Left Wrist less than 1 day                Physical Exam   Constitutional: She is oriented to person, place, and time. She appears well-developed and well-nourished. No distress.   HENT:   Head: Normocephalic and atraumatic.   Eyes: Pupils are equal, round, and reactive to light. Right eye exhibits no discharge. Left eye exhibits no discharge.   Neck: Neck supple. JVD present.   Cardiovascular: Normal rate, regular rhythm, S1 normal, S2 normal and normal heart sounds.   No murmur heard.  Pulmonary/Chest: Effort normal. No respiratory distress. She has no wheezes. She has rales (BLE).   Abdominal: Soft. She exhibits no distension.   Musculoskeletal: She exhibits edema.   Neurological: She is alert and oriented to person, place, and time.   Skin: Skin is warm and dry. She is not diaphoretic. No erythema.    Psychiatric: She has a normal mood and affect. Her behavior is normal. Thought content normal.   Nursing note and vitals reviewed.      Significant Labs:   CMP   Recent Labs   Lab 05/26/20 1648 05/27/20  0340    141   K 4.6 3.6    101   CO2 22* 30*   * 114*   BUN 12 15   CREATININE 0.9 0.7   CALCIUM 9.2 8.7   PROT 8.1  --    ALBUMIN 4.3  --    BILITOT 0.9  --    ALKPHOS 127  --    AST 40  --    ALT 30  --    ANIONGAP 13 10   ESTGFRAFRICA >60 >60   EGFRNONAA >60 >60   , CBC   Recent Labs   Lab 05/26/20 1648 05/27/20  0340   WBC 12.44 5.13   HGB 18.0* 15.4   HCT 57.3* 47.5    170   , Troponin   Recent Labs   Lab 05/26/20 1648 05/26/20  1956   TROPONINI 0.007 0.029*    and All pertinent lab results from the last 24 hours have been reviewed.    Significant Imaging: Echocardiogram:   2D echo with color flow doppler:   Results for orders placed or performed in visit on 01/13/20   2D Echo w/ Color Flow Doppler   Result Value Ref Range    QEF 60 55 - 65    Mitral Valve Regurgitation MILD     Diastolic Dysfunction No     Est. PA Systolic Pressure 19.71     Narrative    Date of Procedure: 01/13/2020        TEST DESCRIPTION       Aorta: The aortic root is normal in size, measuring 2.8 cm at sinotubular junction and 3.2 cm at Sinuses of Valsalva. The proximal ascending aorta is normal in size, measuring 3.0 cm across.     Left Atrium: The left atrial volume index is normal, measuring 25.05 cc/m2.     Left Ventricle: The left ventricle is normal in size, with an end-diastolic diameter of 5.4 cm, and an end-systolic diameter of 4.4 cm. LV wall thickness is normal, with the septum measuring 1.1 cm and the posterior wall measuring 1.0 cm across. Relative   wall thickness was normal at 0.37, and the LV mass index was increased at 156.5 g/m2 consistent with eccentric left ventricular hypertrophy. There are no regional wall motion abnormalities. Left ventricular systolic function appears normal. Visually    estimated ejection fraction is 60-65%. The LV Doppler derived stroke volume equals 70.0 ccs.     Diastolic indices: E wave velocity 0.7 m/s, E/A ratio 0.7,  msec., E/e' ratio(avg) 10. Diastolic function is normal.     Right Atrium: The right atrium is normal in size, measuring 3.9 cm in length and 3.2 cm in width in the apical view.     Right Ventricle: The right ventricle is normal in size measuring 2.9 cm at the base in the apical right ventricle-focused view. Global right ventricular systolic function appears normal. The estimated PA systolic pressure is greater than 20 mmHg.     Aortic Valve:  Aortic valve is normal in structure with normal leaflet mobility.     Mitral Valve:  Mitral valve is normal in structure with normal leaflet mobility. There is mild mitral regurgitation.     Tricuspid Valve:  Tricuspid valve is normal in structure with normal leaflet mobility.     Pulmonary Valve:  Pulmonary valve is normal in structure with normal leaflet mobility.     Intracavitary: There is no evidence of pericardial effusion, intracavity mass, thrombi, or vegetation.         CONCLUSIONS     1 - Eccentric hypertrophy.     2 - No wall motion abnormalities.     3 - Normal left ventricular systolic function (EF 60-65%).     4 - Normal left ventricular diastolic function.     5 - Normal right ventricular systolic function .     6 - The estimated PA systolic pressure is greater than 20 mmHg.     7 - Mild mitral regurgitation.             This document has been electronically    SIGNED BY: Sanjeev Prieto MD On: 01/14/2020 15:40   , EKG: Reviewed and X-Ray: CXR: X-Ray Chest 1 View (CXR): No results found for this visit on 05/26/20. and X-Ray Chest PA and Lateral (CXR): No results found for this visit on 05/26/20.

## 2020-05-27 NOTE — PLAN OF CARE
05/27/20 1551   Discharge Assessment   Assessment Type Discharge Planning Assessment   Confirmed/corrected address and phone number on facesheet? Yes   Assessment information obtained from? Patient   Prior to hospitilization cognitive status: Alert/Oriented   Prior to hospitalization functional status: Independent   Current cognitive status: Alert/Oriented   Current Functional Status: Independent   Lives With alone   Able to Return to Prior Arrangements yes   Is patient able to care for self after discharge? Yes   Who are your caregiver(s) and their phone number(s)? Litzy Lopez (friend) [phone]   Patient's perception of discharge disposition home or selfcare   Readmission Within the Last 30 Days no previous admission in last 30 days   Patient currently being followed by outpatient case management? No   Patient currently receives any other outside agency services? No   Equipment Currently Used at Home none   Do you have any problems affording any of your prescribed medications? No   Is the patient taking medications as prescribed? yes   Does the patient have transportation home? Yes   Transportation Anticipated family or friend will provide   Does the patient receive services at the [facility]? No   Discharge Plan A Home   Patient/Family in Agreement with Plan yes     Spoke with pt for DC assessment. Pt lives at home alone and does not use any DME. No CM DC needs identified at this time. SWer provided a transitional care folder, information on advanced directives, information on pharmacy bedside delivery, and discharge planning begins on admission with contact information for any needs/questions. Pt opted out of bedside medication delivery. Pt's typical pharmacy is [facility].       [facility] [address] [place], [place] - [address] [address]  Phone: [phone] Fax: [phone]    [facility] [address] [place] - [address] [place]  [place] [address]  [address] [address] [address]  Phone: [phone] Fax: [phone]    Pt reported that she does not have a PCP.    Juan Manuel Messina LMSW 5/27/2020 3:55 PM

## 2020-05-27 NOTE — ED NOTES
Pt requesting trial off of AVAPS. AVAPS removed and spo2 began to decrease without worsening of respiratory status. spo2 decreased to 88% Pt placed on 4L NC with no increase in spo2. Pt placed back on AVAPS. spo2 increasing.To notify hospital medicine.

## 2020-05-27 NOTE — SUBJECTIVE & OBJECTIVE
Past Medical History:   Diagnosis Date    Cardiomyopathy     CHF (congestive heart failure)     COPD exacerbation 5/26/2020    Hypertension        Past Surgical History:   Procedure Laterality Date    JOINT REPLACEMENT         Review of patient's allergies indicates:   Allergen Reactions    Pcn [penicillins]      Unknown reaction         Family History     Problem Relation (Age of Onset)    Heart failure Mother    Hypertension Brother        Tobacco Use    Smoking status: Current Every Day Smoker     Packs/day: 0.25     Years: 32.00     Pack years: 8.00    Smokeless tobacco: Never Used   Substance and Sexual Activity    Alcohol use: Yes     Comment: Occassionally    Drug use: No    Sexual activity: Not on file         Review of Systems   Constitutional: Positive for fatigue. Negative for fever.   HENT: Negative for congestion and sinus pressure.    Eyes: Negative for visual disturbance.   Respiratory: Positive for shortness of breath. Negative for cough, chest tightness and wheezing.    Cardiovascular: Positive for leg swelling. Negative for chest pain and palpitations.   Gastrointestinal: Negative for nausea and vomiting.   Genitourinary: Negative for difficulty urinating.   Musculoskeletal: Negative for back pain.   Skin: Negative for rash.   Neurological: Negative for headaches.   Psychiatric/Behavioral: Negative for confusion.     Objective:     Vital Signs (Most Recent):  Temp: 97.1 °F (36.2 °C) (05/26/20 1846)  Pulse: 78 (05/26/20 1946)  Resp: (!) 34 (05/26/20 1946)  BP: 132/86 (05/26/20 1946)  SpO2: 95 % (05/26/20 1946) Vital Signs (24h Range):  Temp:  [97.1 °F (36.2 °C)-97.9 °F (36.6 °C)] 97.1 °F (36.2 °C)  Pulse:  [] 78  Resp:  [24-42] 34  SpO2:  [79 %-100 %] 95 %  BP: (132-195)/() 132/86     Weight: 64.4 kg (142 lb)  Body mass index is 25.15 kg/m².      Intake/Output Summary (Last 24 hours) at 5/26/2020 2008  Last data filed at 5/26/2020 1900  Gross per 24 hour   Intake 150 ml    Output 700 ml   Net -550 ml       Physical Exam   Constitutional: She is oriented to person, place, and time. She appears well-developed and well-nourished. No distress.   HENT:   Head: Normocephalic and atraumatic.   Eyes: Pupils are equal, round, and reactive to light. Conjunctivae are normal. Right eye exhibits no discharge. Left eye exhibits no discharge.   Neck: Normal range of motion. No thyromegaly present.   Cardiovascular: Normal rate, regular rhythm and normal heart sounds. Exam reveals no gallop and no friction rub.   No murmur heard.  Pulmonary/Chest: Effort normal and breath sounds normal. No stridor. No respiratory distress. She has no wheezes. She has no rales.   Bibasilar crackles, on AVAPS   Abdominal: Soft. Bowel sounds are normal. She exhibits no distension and no mass. There is no tenderness. There is no guarding.   Musculoskeletal: Normal range of motion. She exhibits edema (3+ BLE).   Neurological: She is alert and oriented to person, place, and time.   Skin: Skin is warm. She is not diaphoretic. No erythema.   Psychiatric: She has a normal mood and affect. Her behavior is normal. Judgment and thought content normal.       Vents:  Oxygen Concentration (%): 65 (05/26/20 1900)    Lines/Drains/Airways     Drain                 Urethral Catheter 05/26/20 1900 Latex 16 Fr. less than 1 day          Peripheral Intravenous Line                 Peripheral IV - Single Lumen 05/26/20 1646 18 G Left Antecubital less than 1 day         Peripheral IV - Single Lumen 05/26/20 1652 20 G Left Wrist less than 1 day                Significant Labs:    CBC/Anemia Profile:  Recent Labs   Lab 05/26/20  1648   WBC 12.44   HGB 18.0*   HCT 57.3*      *   RDW 13.2        Chemistries:  Recent Labs   Lab 05/26/20  1648      K 4.6      CO2 22*   BUN 12   CREATININE 0.9   CALCIUM 9.2   ALBUMIN 4.3   PROT 8.1   BILITOT 0.9   ALKPHOS 127   ALT 30   AST 40       Results for orders placed or performed  during the hospital encounter of 05/26/20   CBC auto differential   Result Value Ref Range    WBC 12.44 3.90 - 12.70 K/uL    RBC 5.55 (H) 4.00 - 5.40 M/uL    Hemoglobin 18.0 (H) 12.0 - 16.0 g/dL    Hematocrit 57.3 (H) 37.0 - 48.5 %    Mean Corpuscular Volume 103 (H) 82 - 98 fL    Mean Corpuscular Hemoglobin 32.4 (H) 27.0 - 31.0 pg    Mean Corpuscular Hemoglobin Conc 31.4 (L) 32.0 - 36.0 g/dL    RDW 13.2 11.5 - 14.5 %    Platelets 197 150 - 350 K/uL    MPV 10.6 9.2 - 12.9 fL    Immature Granulocytes 0.3 0.0 - 0.5 %    Gran # (ANC) 7.2 1.8 - 7.7 K/uL    Immature Grans (Abs) 0.04 0.00 - 0.04 K/uL    Lymph # 4.0 1.0 - 4.8 K/uL    Mono # 0.8 0.3 - 1.0 K/uL    Eos # 0.4 0.0 - 0.5 K/uL    Baso # 0.06 0.00 - 0.20 K/uL    nRBC 0 0 /100 WBC    Gran% 58.0 38.0 - 73.0 %    Lymph% 32.2 18.0 - 48.0 %    Mono% 6.2 4.0 - 15.0 %    Eosinophil% 2.8 0.0 - 8.0 %    Basophil% 0.5 0.0 - 1.9 %    Differential Method Automated    Comprehensive metabolic panel   Result Value Ref Range    Sodium 140 136 - 145 mmol/L    Potassium 4.6 3.5 - 5.1 mmol/L    Chloride 105 95 - 110 mmol/L    CO2 22 (L) 23 - 29 mmol/L    Glucose 153 (H) 70 - 110 mg/dL    BUN, Bld 12 6 - 20 mg/dL    Creatinine 0.9 0.5 - 1.4 mg/dL    Calcium 9.2 8.7 - 10.5 mg/dL    Total Protein 8.1 6.0 - 8.4 g/dL    Albumin 4.3 3.5 - 5.2 g/dL    Total Bilirubin 0.9 0.1 - 1.0 mg/dL    Alkaline Phosphatase 127 55 - 135 U/L    AST 40 10 - 40 U/L    ALT 30 10 - 44 U/L    Anion Gap 13 8 - 16 mmol/L    eGFR if African American >60 >60 mL/min/1.73 m^2    eGFR if non African American >60 >60 mL/min/1.73 m^2   Troponin I #1   Result Value Ref Range    Troponin I 0.007 0.000 - 0.026 ng/mL   B-Type natriuretic peptide (BNP)   Result Value Ref Range    BNP 2,066 (H) 0 - 99 pg/mL   COVID-19 Rapid Screening   Result Value Ref Range    SARS-CoV-2 RNA, Amplification, Qual Negative Negative   Lactic acid, plasma   Result Value Ref Range    Lactate (Lactic Acid) 3.9 (HH) 0.5 - 2.2 mmol/L   ISTAT  PROCEDURE   Result Value Ref Range    POC PH 7.179 (LL) 7.35 - 7.45    POC PCO2 71.5 (HH) 35 - 45 mmHg    POC PO2 94 80 - 100 mmHg    POC HCO3 26.6 24 - 28 mmol/L    POC BE -2 -2 to 2 mmol/L    POC SATURATED O2 95 95 - 100 %    Rate 12     Sample ARTERIAL     Site RR     Allens Test Pass     DelSys CPAP/BiPAP     Mode BiPAP     FiO2 80     IP 12     EP 6    ISTAT PROCEDURE   Result Value Ref Range    POC PH 7.277 (LL) 7.35 - 7.45    POC PCO2 55.7 (H) 35 - 45 mmHg    POC PO2 83 80 - 100 mmHg    POC HCO3 26.0 24 - 28 mmol/L    POC BE -1 -2 to 2 mmol/L    POC SATURATED O2 94 (L) 95 - 100 %    Rate 25     Sample ARTERIAL     Site RR     Allens Test Pass     DelSys CPAP/BiPAP     Mode AVAPS     Vt 350     FiO2 65         Significant Imaging:   X-Ray Chest AP Portable  Narrative: EXAMINATION:  XR CHEST AP PORTABLE    CLINICAL HISTORY:  Chest Pain;    TECHNIQUE:  Single frontal view of the chest was performed.    COMPARISON:  Chest radiograph 05/31/2013; CTA chest 05/31/2013    FINDINGS:  Cardiac leads project over the chest.  Cardiomediastinal silhouette is enlarged, similar to the prior.  Mild perihilar vascular congestion with mild edema.  Hyperinflation of both lungs and prominence of the bronchovascular markings concerning for COPD.  Flattening of the diaphragms and chronic blunting of the bilateral costophrenic angles.  No pneumothorax.  Osseous structures appear intact.  Impression: Cardiomegaly and mild perihilar vascular congestion and edema.    Electronically signed by: Cooper Oswald  Date:    05/26/2020  Time:    16:58

## 2020-05-27 NOTE — ED NOTES
Report to Corey RN, ICU. Corey suggests that pt remain on AVAPS rather than trying vapotherm secondary to initial problem being co2 not po2. Discussed with pt, respiratory, and ICU Dr Bunn, who all agree. Pt to remain on AVAPS.

## 2020-05-27 NOTE — ASSESSMENT & PLAN NOTE
-History of systolic CHF/NICM although EF 60% by 2D echo 1/13/2020  -BNP > 2000  -CXR shows vascular congestion/pulmonary edema  -Patient admits to dietary non-compliance and missing some does of medications  -Diurese with additional 20 mg of IV Lasix later this afternoon  -Continue BB, ACEi  -Repeat echo pending

## 2020-05-27 NOTE — HOSPITAL COURSE
05/27: seen and examined: Off  BIPAP/AVAPS, on 2 LPM NC, ambulated well round unit with PT, Smoker 1 ppd x > 25 years, needs f/u pulmonary, Off NTG drip, Acute respiratory acidosis on initial ABG

## 2020-05-27 NOTE — H&P
[facility]  Critical Care Medicine  History & Physical    Patient Name: Melissa Petty  MRN: [id number]  Admission Date: 5/26/2020  Hospital Length of Stay: 0 days  Code Status: Full Code  Attending Physician: Stanley Bunn MD  Primary Care Provider: Primary Doctor No   Principal Problem: Acute exacerbation of CHF (congestive heart failure)    Subjective:     HPI:  Patient is a 55 y.o.   female with a PMHx of cardiomyopathy, CHF, PVC and HTN who presents to the Emergency Department for evaluation of shortness of breath which onset gradually last night.  Patient reports the symptoms have progressively gotten worse.  Denies any chest pain, fever or cough.  Associated symptoms include orthopnea, lower extremity edema.  Reports being compliant with medications.  Patient reveals that she has been smoking 1 pack per day for 20-30 years.  She is a full code and surrogate decision maker is her son.    In the ED, patient presenting with respiratory distress and hypercapnia noted on ABG.  She was initially started on BiPAP however was switched to AVAPS.  Highest blood pressure recorded was 190/130 and pulmonary congestion noted on chest x-ray.  She was started on nitroglycerin drip.  Patient was given 3 rounds of breathing treatments as started on Levaquin and Solu-Medrol. Patient was admitted to ICU for further monitoring.     Hospital/ICU Course:  No notes on file     Past Medical History:   Diagnosis Date    Cardiomyopathy     CHF (congestive heart failure)     COPD exacerbation 5/26/2020    Hypertension        Past Surgical History:   Procedure Laterality Date    JOINT REPLACEMENT         Review of patient's allergies indicates:   Allergen Reactions    Pcn [penicillins]      Unknown reaction         Family History     Problem Relation (Age of Onset)    Heart failure Mother    Hypertension Brother        Tobacco Use    Smoking status: Current Every Day Smoker     Packs/day: 0.25     Years: 32.00      Pack years: 8.00    Smokeless tobacco: Never Used   Substance and Sexual Activity    Alcohol use: Yes     Comment: Occassionally    Drug use: No    Sexual activity: Not on file         Review of Systems   Constitutional: Positive for fatigue. Negative for fever.   HENT: Negative for congestion and sinus pressure.    Eyes: Negative for visual disturbance.   Respiratory: Positive for shortness of breath. Negative for cough, chest tightness and wheezing.    Cardiovascular: Positive for leg swelling. Negative for chest pain and palpitations.   Gastrointestinal: Negative for nausea and vomiting.   Genitourinary: Negative for difficulty urinating.   Musculoskeletal: Negative for back pain.   Skin: Negative for rash.   Neurological: Negative for headaches.   Psychiatric/Behavioral: Negative for confusion.     Objective:     Vital Signs (Most Recent):  Temp: 97.1 °F (36.2 °C) (05/26/20 1846)  Pulse: 78 (05/26/20 1946)  Resp: (!) 34 (05/26/20 1946)  BP: 132/86 (05/26/20 1946)  SpO2: 95 % (05/26/20 1946) Vital Signs (24h Range):  Temp:  [97.1 °F (36.2 °C)-97.9 °F (36.6 °C)] 97.1 °F (36.2 °C)  Pulse:  [] 78  Resp:  [24-42] 34  SpO2:  [79 %-100 %] 95 %  BP: (132-195)/() 132/86     Weight: 64.4 kg (142 lb)  Body mass index is 25.15 kg/m².      Intake/Output Summary (Last 24 hours) at 5/26/2020 2008  Last data filed at 5/26/2020 1900  Gross per 24 hour   Intake 150 ml   Output 700 ml   Net -550 ml       Physical Exam   Constitutional: She is oriented to person, place, and time. She appears well-developed and well-nourished. No distress.   HENT:   Head: Normocephalic and atraumatic.   Eyes: Pupils are equal, round, and reactive to light. Conjunctivae are normal. Right eye exhibits no discharge. Left eye exhibits no discharge.   Neck: Normal range of motion. No thyromegaly present.   Cardiovascular: Normal rate, regular rhythm and normal heart sounds. Exam reveals no gallop and no friction rub.   No murmur  heard.  Pulmonary/Chest: Effort normal and breath sounds normal. No stridor. No respiratory distress. She has no wheezes. She has no rales.   Bibasilar crackles, on AVAPS   Abdominal: Soft. Bowel sounds are normal. She exhibits no distension and no mass. There is no tenderness. There is no guarding.   Musculoskeletal: Normal range of motion. She exhibits edema (3+ BLE).   Neurological: She is alert and oriented to person, place, and time.   Skin: Skin is warm. She is not diaphoretic. No erythema.   Psychiatric: She has a normal mood and affect. Her behavior is normal. Judgment and thought content normal.       Vents:  Oxygen Concentration (%): 65 (05/26/20 1900)    Lines/Drains/Airways     Drain                 Urethral Catheter 05/26/20 1900 Latex 16 Fr. less than 1 day          Peripheral Intravenous Line                 Peripheral IV - Single Lumen 05/26/20 1646 18 G Left Antecubital less than 1 day         Peripheral IV - Single Lumen 05/26/20 1652 20 G Left Wrist less than 1 day                Significant Labs:    CBC/Anemia Profile:  Recent Labs   Lab 05/26/20  1648   WBC 12.44   HGB 18.0*   HCT 57.3*      *   RDW 13.2        Chemistries:  Recent Labs   Lab 05/26/20  1648      K 4.6      CO2 22*   BUN 12   CREATININE 0.9   CALCIUM 9.2   ALBUMIN 4.3   PROT 8.1   BILITOT 0.9   ALKPHOS 127   ALT 30   AST 40       Results for orders placed or performed during the hospital encounter of 05/26/20   CBC auto differential   Result Value Ref Range    WBC 12.44 3.90 - 12.70 K/uL    RBC 5.55 (H) 4.00 - 5.40 M/uL    Hemoglobin 18.0 (H) 12.0 - 16.0 g/dL    Hematocrit 57.3 (H) 37.0 - 48.5 %    Mean Corpuscular Volume 103 (H) 82 - 98 fL    Mean Corpuscular Hemoglobin 32.4 (H) 27.0 - 31.0 pg    Mean Corpuscular Hemoglobin Conc 31.4 (L) 32.0 - 36.0 g/dL    RDW 13.2 11.5 - 14.5 %    Platelets 197 150 - 350 K/uL    MPV 10.6 9.2 - 12.9 fL    Immature Granulocytes 0.3 0.0 - 0.5 %    Gran # (ANC) 7.2 1.8 -  7.7 K/uL    Immature Grans (Abs) 0.04 0.00 - 0.04 K/uL    Lymph # 4.0 1.0 - 4.8 K/uL    Mono # 0.8 0.3 - 1.0 K/uL    Eos # 0.4 0.0 - 0.5 K/uL    Baso # 0.06 0.00 - 0.20 K/uL    nRBC 0 0 /100 WBC    Gran% 58.0 38.0 - 73.0 %    Lymph% 32.2 18.0 - 48.0 %    Mono% 6.2 4.0 - 15.0 %    Eosinophil% 2.8 0.0 - 8.0 %    Basophil% 0.5 0.0 - 1.9 %    Differential Method Automated    Comprehensive metabolic panel   Result Value Ref Range    Sodium 140 136 - 145 mmol/L    Potassium 4.6 3.5 - 5.1 mmol/L    Chloride 105 95 - 110 mmol/L    CO2 22 (L) 23 - 29 mmol/L    Glucose 153 (H) 70 - 110 mg/dL    BUN, Bld 12 6 - 20 mg/dL    Creatinine 0.9 0.5 - 1.4 mg/dL    Calcium 9.2 8.7 - 10.5 mg/dL    Total Protein 8.1 6.0 - 8.4 g/dL    Albumin 4.3 3.5 - 5.2 g/dL    Total Bilirubin 0.9 0.1 - 1.0 mg/dL    Alkaline Phosphatase 127 55 - 135 U/L    AST 40 10 - 40 U/L    ALT 30 10 - 44 U/L    Anion Gap 13 8 - 16 mmol/L    eGFR if African American >60 >60 mL/min/1.73 m^2    eGFR if non African American >60 >60 mL/min/1.73 m^2   Troponin I #1   Result Value Ref Range    Troponin I 0.007 0.000 - 0.026 ng/mL   B-Type natriuretic peptide (BNP)   Result Value Ref Range    BNP 2,066 (H) 0 - 99 pg/mL   COVID-19 Rapid Screening   Result Value Ref Range    SARS-CoV-2 RNA, Amplification, Qual Negative Negative   Lactic acid, plasma   Result Value Ref Range    Lactate (Lactic Acid) 3.9 (HH) 0.5 - 2.2 mmol/L   ISTAT PROCEDURE   Result Value Ref Range    POC PH 7.179 (LL) 7.35 - 7.45    POC PCO2 71.5 (HH) 35 - 45 mmHg    POC PO2 94 80 - 100 mmHg    POC HCO3 26.6 24 - 28 mmol/L    POC BE -2 -2 to 2 mmol/L    POC SATURATED O2 95 95 - 100 %    Rate 12     Sample ARTERIAL     Site RR     Allens Test Pass     DelSys CPAP/BiPAP     Mode BiPAP     FiO2 80     IP 12     EP 6    ISTAT PROCEDURE   Result Value Ref Range    POC PH 7.277 (LL) 7.35 - 7.45    POC PCO2 55.7 (H) 35 - 45 mmHg    POC PO2 83 80 - 100 mmHg    POC HCO3 26.0 24 - 28 mmol/L    POC BE -1 -2 to 2  mmol/L    POC SATURATED O2 94 (L) 95 - 100 %    Rate 25     Sample ARTERIAL     Site RR     Allens Test Pass     DelSys CPAP/BiPAP     Mode AVAPS     Vt 350     FiO2 65         Significant Imaging:   X-Ray Chest AP Portable  Narrative: EXAMINATION:  XR CHEST AP PORTABLE    CLINICAL HISTORY:  Chest Pain;    TECHNIQUE:  Single frontal view of the chest was performed.    COMPARISON:  Chest radiograph 05/31/2013; CTA chest 05/31/2013    FINDINGS:  Cardiac leads project over the chest.  Cardiomediastinal silhouette is enlarged, similar to the prior.  Mild perihilar vascular congestion with mild edema.  Hyperinflation of both lungs and prominence of the bronchovascular markings concerning for COPD.  Flattening of the diaphragms and chronic blunting of the bilateral costophrenic angles.  No pneumothorax.  Osseous structures appear intact.  Impression: Cardiomegaly and mild perihilar vascular congestion and edema.    Electronically signed by: Cooper Oswald  Date:    05/26/2020  Time:    16:58        Assessment/Plan:     Pulmonary  COPD exacerbation  5/26:  Patient denies history of COPD  However given long history of tobacco abuse  And hypercapnia noted on ABG, patient likely has undiagnosed COPD  Will need outpatient follow-up with pulmonology  Was started on Levaquin and Solu-Medrol in the ED  Will check procalcitonin  Will continue to treat COPD exacerbation  With azithromycin and prednisone  DuoNebs q.6 while awake    Cardiac/Vascular  * Acute exacerbation of CHF (congestive heart failure)  5/26:  BNP:  2000  Pulmonary congestion noted on chest x-ray  Lasix 40 mg given once in ED, will continue Lasix 20 mg daily  Continue monitor I's and O's  Reviewed echo from 01/2020 and EF was normal  Echo from 10/2019 showed a EF of 30%  Will repeat echo    Hypertensive emergency  5/26:  BP > 180/120 with pulmonary congestion noted  Was started on nitro glycerin drip  Continue weaning drip as tolerated  Hydralazine p.r.n.  Resume  home BP meds    PVC (premature ventricular contraction)  5/26:  Will continue home amiodarone  Continue monitor electrolytes  Plan to keep K >4, Mg>2    Other  Tobacco abuse  5/26:  Smoking cessation counseling offered          Critical Care Daily Checklist:    A: Awake: RASS Goal/Actual Goal:    Actual:     B: Spontaneous Breathing Trial Performed?     C: SAT & SBT Coordinated?  no                      D: Delirium: CAM-ICU     E: Early Mobility Performed? No   F: Feeding Goal:    Status:     Current Diet Order   Procedures    Diet NPO      AS: Analgesia/Sedation Tylenol prn   T: Thromboembolic Prophylaxis lovenox   H: HOB > 300 Yes   U: Stress Ulcer Prophylaxis (if needed) none   G: Glucose Control none   B: Bowel Function     I: Indwelling Catheter (Lines & Vazquez) Necessity Peripheral, vazquez   D: De-escalation of Antimicrobials/Pharmacotherapies azithromycin    Plan for the day/ETD Wean O2 and nitro drip as tolerated, step down in am    Code Status:  Family/Goals of Care: Full Code  Surrogate decision maker is her son     Critical Care Time: 65 minutes  Critical secondary to Patient has a condition that poses threat to life and bodily function: respiratory distress, htn emergency     Critical care was time spent personally by me on the following activities: development of treatment plan with patient or surrogate and bedside caregivers, discussions with consultants, evaluation of patient's response to treatment, examination of patient, ordering and performing treatments and interventions, ordering and review of laboratory studies, ordering and review of radiographic studies, pulse oximetry, re-evaluation of patient's condition. This critical care time did not overlap with that of any other provider or involve time for any procedures.     Stanley Bunn MD  Critical Care Medicine  [facility]

## 2020-05-27 NOTE — PROGRESS NOTES
[facility] -   Critical Care Medicine  Progress Note    Patient Name: Melissa Petty  MRN: [id number]  Admission Date: 5/26/2020  Hospital Length of Stay: 1 days  Code Status: Full Code  Attending Provider: Herson Le MD  Primary Care Provider: Primary Doctor No   Principal Problem: Acute exacerbation of CHF (congestive heart failure)    Subjective:     HPI:  Patient is a 55 y.o.   female with a PMHx of cardiomyopathy, CHF, PVC and HTN who presents to the Emergency Department for evaluation of shortness of breath which onset gradually last night.  Patient reports the symptoms have progressively gotten worse.  Denies any chest pain, fever or cough.  Associated symptoms include orthopnea, lower extremity edema.  Reports being compliant with medications.  Patient reveals that she has been smoking 1 pack per day for 20-30 years.  She is a full code and surrogate decision maker is her son.    In the ED, patient presenting with respiratory distress and hypercapnia noted on ABG.  She was initially started on BiPAP however was switched to AVAPS.  Highest blood pressure recorded was 190/130 and pulmonary congestion noted on chest x-ray.  She was started on nitroglycerin drip.  Patient was given 3 rounds of breathing treatments as started on Levaquin and Solu-Medrol. Patient was admitted to ICU for further monitoring.     Chief Complaint   Patient presents with    Shortness of Breath     started last night, sent over by urgent care.         Past Medical History:   Diagnosis Date    Cardiomyopathy     CHF (congestive heart failure)     COPD exacerbation 5/26/2020    Hypertension         Hospital/ICU Course:  05/27: seen and examined: Off  BIPAP/AVAPS, on 2 LPM NC, ambulated well round unit with PT, Smoker 1 ppd x > 25 years, needs f/u pulmonary, Off NTG drip, Acute respiratory acidosis on initial ABG    Interval History: *   Review of Systems   Constitutional: Positive for malaise/fatigue.    Respiratory: Positive for shortness of breath.    Cardiovascular: Positive for orthopnea and leg swelling.   All other systems reviewed and are negative.       Devices: Oxygen   seen and examined: Off  BIPAP/AVAPS, on 2 LPM NC, ambulated well round unit with PT, Smoker 1 ppd x > 25 years, needs f/u pulmonary, Off NTG drip    Objective:     Vital Signs (Most Recent):  Temp: 97.8 °F (36.6 °C) (05/27/20 1101)  Pulse: 69 (05/27/20 1101)  Resp: 19 (05/27/20 1101)  BP: (!) 102/55 (05/27/20 1101)  SpO2: (!) 94 % (05/27/20 1101) Vital Signs (24h Range):  Temp:  [97.1 °F (36.2 °C)-98 °F (36.7 °C)] 97.8 °F (36.6 °C)  Pulse:  [] 69  Resp:  [6-42] 19  SpO2:  [79 %-100 %] 94 %  BP: (102-195)/() 102/55     Weight: 64.4 kg (142 lb)  Body mass index is 25.15 kg/m².      Intake/Output Summary (Last 24 hours) at 5/27/2020 1230  Last data filed at 5/27/2020 0701  Gross per 24 hour   Intake 390 ml   Output 2580 ml   Net -2190 ml       Physical Exam   Constitutional: She appears well-developed and well-nourished.   HENT:   Head: Normocephalic and atraumatic.   Eyes: Pupils are equal, round, and reactive to light. EOM are normal.   Neck: JVD present.   Cardiovascular: Normal heart sounds.   No murmur heard.  Pulmonary/Chest: Effort normal and breath sounds normal.   Abdominal: Bowel sounds are normal.   Musculoskeletal: She exhibits edema.   Neurological: She is alert.   Skin: Skin is warm and dry.   Nursing note and vitals reviewed.      Vents:  Oxygen Concentration (%): 50 (05/27/20 0315)    Lines/Drains/Airways     Drain                 Urethral Catheter 05/26/20 1900 Latex 16 Fr. less than 1 day          Peripheral Intravenous Line                 Peripheral IV - Single Lumen 05/26/20 1646 18 G Left Antecubital less than 1 day         Peripheral IV - Single Lumen 05/26/20 1652 20 G Left Wrist less than 1 day                Significant Labs:    CBC/Anemia Profile:  Recent Labs   Lab 05/26/20  1648 05/27/20  0340   WBC  12.44 5.13   HGB 18.0* 15.4   HCT 57.3* 47.5    170   * 99*   RDW 13.2 12.8        Chemistries:  Recent Labs   Lab 05/26/20 1648 05/27/20  0340    141   K 4.6 3.6    101   CO2 22* 30*   BUN 12 15   CREATININE 0.9 0.7   CALCIUM 9.2 8.7   ALBUMIN 4.3  --    PROT 8.1  --    BILITOT 0.9  --    ALKPHOS 127  --    ALT 30  --    AST 40  --    MG  --  1.5*   PHOS  --  3.7       ABGs:   Recent Labs   Lab 05/26/20  1825   PH 7.277*   PCO2 55.7*   HCO3 26.0   POCSATURATED 94*   BE -1     Blood Culture:   Recent Labs   Lab 05/26/20  1650   LABBLOO No Growth to date  No Growth to date     CMP:   Recent Labs   Lab 05/26/20 1648 05/27/20  0340    141   K 4.6 3.6    101   CO2 22* 30*   * 114*   BUN 12 15   CREATININE 0.9 0.7   CALCIUM 9.2 8.7   PROT 8.1  --    ALBUMIN 4.3  --    BILITOT 0.9  --    ALKPHOS 127  --    AST 40  --    ALT 30  --    ANIONGAP 13 10   EGFRNONAA >60 >60     Cardiac Markers: No results for input(s): CKMB, TROPONINT, MYOGLOBIN in the last 48 hours.  Lactic Acid:   Recent Labs   Lab 05/26/20 1650   LACTATE 3.9*     POCT Glucose: No results for input(s): POCTGLUCOSE in the last 48 hours.  Respiratory Culture: No results for input(s): GSRESP, RESPIRATORYC in the last 48 hours.  Troponin:   Recent Labs   Lab 05/26/20 1648 05/26/20 1956   TROPONINI 0.007 0.029*     Urine Culture: No results for input(s): LABURIN in the last 48 hours.  All pertinent labs within the past 24 hours have been reviewed.    Significant Imaging:  I have reviewed and interpreted all pertinent imaging results/findings within the past 24 hours.   CXR  EXAMINATION:  XR CHEST AP PORTABLE    CLINICAL HISTORY:  Chest Pain;    TECHNIQUE:  Single frontal view of the chest was performed.    COMPARISON:  Chest radiograph 05/31/2013; CTA chest 05/31/2013    FINDINGS:  Cardiac leads project over the chest.  Cardiomediastinal silhouette is enlarged, similar to the prior.  Mild perihilar vascular  congestion with mild edema.  Hyperinflation of both lungs and prominence of the bronchovascular markings concerning for COPD.  Flattening of the diaphragms and chronic blunting of the bilateral costophrenic angles.  No pneumothorax.  Osseous structures appear intact.      Impression       Cardiomegaly and mild perihilar vascular congestion and edema.           ECHO  · Concentric left ventricular hypertrophy.  · Mildly decreased left ventricular systolic function. The estimated ejection fraction is 45%.  · Grade I (mild) left ventricular diastolic dysfunction consistent with impaired relaxation.  · Normal right ventricular systolic function.  · Global hypokinetic wall motion.  · Moderate left atrial enlargement.  Normal central venous pressure (3 mmHg).        ABG  Recent Labs   Lab 05/26/20  1825   PH 7.277*   PO2 83   PCO2 55.7*   HCO3 26.0   BE -1     Assessment/Plan:     Pulmonary  COPD exacerbation  Patient denies history of COPD: however significant smokingHx  Hypercapnia noted on ABG, patient likely has undiagnosed COPD  · Bronchodilators  · Oxygen Keep SpO2 > 90%  · Incentive spirometry  · Zithromax and Prednisone taper  · Smoking cessation  · Follow up with Pulmonary  · Home O2 eval      Cardiac/Vascular  * Acute exacerbation of CHF (congestive heart failure)  · Diuretics  · Trend BNP  · Strict I/O  · Low salt diet  · Echo  · Beta blocker: COREG  · ACE: LISINOPRIL         Elevated troponin  Serial Troponin to document peak    Hypertensive emergency  Weaned off NTG  Restart Home meds  Hydralazine p.r.n.  Resume home BP meds    PVC (premature ventricular contraction)    Continue monitor electrolytes  Plan to keep K >4, Mg>2    Other  Tobacco abuse  Assistance with smoking cessation was offered, including:  []  Medications  [x]  Counseling  []  Printed Information on Smoking Cessation  []  Referral to a Smoking Cessation Program    Patient was counseled regarding smoking for 3-10 minutes.        Critical Care  Daily Checklist:    A: Awake: RASS Goal/Actual Goal:    Actual: Block Agitation Sedation Scale (RASS): Alert and calm   B: Spontaneous Breathing Trial Performed?     C: SAT & SBT Coordinated?  N/A                      D: Delirium: CAM-ICU     E: Early Mobility Performed? Yes   F: Feeding Goal:    Status:     Current Diet Order   Procedures    Diet Cardiac      AS: Analgesia/Sedation NONE   T: Thromboembolic Prophylaxis LMWH   H: HOB > 300 Yes   U: Stress Ulcer Prophylaxis (if needed) FAMOTIDINE   G: Glucose Control SSI : 114-153   B: Bowel Function     I: Indwelling Catheter (Lines & Martinez) Necessity Martinez, PIV   D: De-escalation of Antimicrobials/Pharmacotherapies Zithromax    Plan for the day/ETD Transfer to Tele after repeat labs      Code Status:  Family/Goals of Care: Full Code  Update family     Critical Care Time: 36 minutes  Critical secondary to Patient has a condition that poses threat to life and bodily function: decompensated heart failure and COPD exacebation      Critical care was time spent personally by me on the following activities: development of treatment plan with patient or surrogate and bedside caregivers, discussions with consultants, evaluation of patient's response to treatment, examination of patient, ordering and performing treatments and interventions, ordering and review of laboratory studies, ordering and review of radiographic studies, pulse oximetry, re-evaluation of patient's condition. This critical care time did not overlap with that of any other provider or involve time for any procedures.     Herson Le MD  Critical Care Medicine  [facility] -

## 2020-05-27 NOTE — PLAN OF CARE
05/27/20 1557   Post-Acute Status   Post-Acute Authorization Other  (None.)   Discharge Plan   Discharge Plan A [place]   Juan Manuel Messina LMSW 5/27/2020 3:58 PM

## 2020-05-27 NOTE — ED NOTES
Pt lying in bed, aaox4, in no acute distress, and with no current complaint. Pt states her HA is better since the admin of tylenol and rates current pain 2/10. Pt with no other complaint at this time. Pt aware she is awaiting an ICU bed. Will continue to monitor. Bed in low position, side rails up, call light in reach.

## 2020-05-27 NOTE — ASSESSMENT & PLAN NOTE
· Diuretics  · Trend BNP  · Strict I/O  · Low salt diet  · Echo  · Beta blocker: COREG  · ACE: LISINOPRIL

## 2020-05-27 NOTE — ASSESSMENT & PLAN NOTE
5/26:  BNP:  2000  Pulmonary congestion noted on chest x-ray  Lasix 40 mg given once in ED, will continue Lasix 20 mg daily  Continue monitor I's and O's  Reviewed echo from 01/2020 and EF was normal  Echo from 10/2019 showed a EF of 30%  Will repeat echo

## 2020-05-27 NOTE — PROGRESS NOTES
Patient called RN to room, requested to take AVAPS off due to discomfort and inability to sleep.  Denies increased WOB and SOB.  Removed and placed on 3L NC - sats after 5 min remain 97%.  No dypnea noted.  MD Bunn notified.  Will leave off and monitor

## 2020-05-27 NOTE — PLAN OF CARE
POC and interventions discussed with patient - VU.  AAO x 4.  Oxygenating well on AVAPS.  Denies SOB and increased WOB.  Diuresing well since lasix given in ED - see I/O's.  No c/o pain.  Weaning off of Nitro gtt. BP stable.

## 2020-05-27 NOTE — PROGRESS NOTES
Patient is currently on 3 L NC Spo2 93%. Patient is tolerating well. No distress noted at this time. Will continue to monitor need for AVAPS.

## 2020-05-27 NOTE — ED NOTES
Upon pt request, attempted to contact boyfriend, Karl [phone] for updated on POC. No answer. Left voicemail.

## 2020-05-27 NOTE — ASSESSMENT & PLAN NOTE
Patient denies history of COPD: however significant smokingHx  Hypercapnia noted on ABG, patient likely has undiagnosed COPD  · Bronchodilators  · Oxygen Keep SpO2 > 90%  · Incentive spirometry  · Zithromax and Prednisone taper  · Smoking cessation  · Follow up with Pulmonary  · Home O2 eval

## 2020-05-27 NOTE — ASSESSMENT & PLAN NOTE
-Troponin 0.007>0.029  -Elevation secondary to demand ischemia from COPD exacerbation, HTN emergency, CHF  -No complaints of chest pain  -Continue ASA, BB, ACEi  -Check echo  -MPI stress test 10/19 negative for ischemia

## 2020-05-27 NOTE — NURSING
Received bedside report from herberth rn/patient aaox4/ no distress noted/ vss/ no complaints or requests at this time/comfort measures given/ will continue to monitor patient

## 2020-05-27 NOTE — CONSULTS
[facility]  Cardiology  Consult Note    Patient Name: Melissa Petty  MRN: [id number]  Admission Date: 5/26/2020  Hospital Length of Stay: 1 days  Code Status: Full Code   Attending Provider: Herson Le MD   Consulting Provider: Faustina Barajas PA-C  Primary Care Physician: Primary Doctor No  Principal Problem:Acute exacerbation of CHF (congestive heart failure)    Patient information was obtained from patient, past medical records and ER records.     Inpatient consult to Cardiology  Consult performed by: Faustina Barajas PA-C  Consult ordered by: Herson Le MD        Subjective:     Chief Complaint:  Shortness of breath     HPI:   Ms. Petty is a 55 year old female patient whose current medical conditions include systolic CHF/dilated NICM, tobacco abuse, and frequent PVC's who presented to [facility] ED yesterday with a chief complaint of worsening SOB that onset the prior evening. Associated symptoms included orthopnea and BLE edema. Patient denied any associated fever, chills, chest pain, PND, palpitations, near syncope, or syncope. Initial workup revealed BNP > 2000, lactic acid of 3.9, and elevated BP (190/130). CXR showed findings consistent with pulmonary edema/vascular congestion. Patient was noted to be in respiratory distress with hypercapnia and was subsequently admitted to ICU on AVAPS. Cardiology consulted to assist with management. Patient seen and examined today. Feeling better, SOB improved. Admits to indulging in salty foods and states she missed some doses of her medications. Excellent diuresis since admission. Chart reviewed.  Echo 1/13/2020 showed normal EF, no WMA. Repeat pending. Prior MPI stress test in 10/19 negative for ischemia/injury. Troponin 0.007>0.029.        Past Medical History:   Diagnosis Date    Cardiomyopathy     CHF (congestive heart failure)     COPD exacerbation 5/26/2020    Hypertension        Past Surgical History:   Procedure Laterality  Date    JOINT REPLACEMENT         Review of patient's allergies indicates:   Allergen Reactions    Pcn [penicillins]      Unknown reaction         No current facility-administered medications on file prior to encounter.      Current Outpatient Medications on File Prior to Encounter   Medication Sig    amiodarone (PACERONE) 200 MG Tab Take 1 tablet (200 mg total) by mouth once daily.    aspirin (ECOTRIN) 81 MG EC tablet Take 81 mg by mouth.    carvedilol (COREG) 25 MG tablet Take 1 tablet (25 mg total) by mouth 2 (two) times daily with meals.    folic acid (FOLVITE) 800 MCG Tab Take 400 mcg by mouth once daily.    INV lisinopril 10 MG Tab Take 1 tablet (10 mg total) by mouth 2 (two) times daily.    furosemide (LASIX) 20 MG tablet Take 1 tablet (20 mg total) by mouth once daily. PRN    potassium chloride SA (K-DUR,KLOR-CON) 20 MEQ tablet Take 1 tablet (20 mEq total) by mouth daily as needed. PRN, takes with lasix    thiamine (VITAMIN B-1) 100 MG tablet Take 250 mg by mouth once daily.     Family History     Problem Relation (Age of Onset)    Heart failure Mother    Hypertension Brother        Tobacco Use    Smoking status: Current Every Day Smoker     Packs/day: 1.00     Years: 32.00     Pack years: 32.00    Smokeless tobacco: Never Used   Substance and Sexual Activity    Alcohol use: Yes     Comment: Occassionally    Drug use: No    Sexual activity: Not on file     Review of Systems   Constitution: Positive for malaise/fatigue.   HENT: Negative.    Eyes: Negative.    Cardiovascular: Positive for dyspnea on exertion, leg swelling and orthopnea.   Respiratory: Positive for cough and shortness of breath.    Endocrine: Negative.    Hematologic/Lymphatic: Negative.    Skin: Negative.    Musculoskeletal: Negative.    Gastrointestinal: Negative.    Genitourinary: Negative.    Neurological: Negative.    Psychiatric/Behavioral: Negative.    Allergic/Immunologic: Negative.      Objective:     Vital Signs (Most  Recent):  Temp: 97.7 °F (36.5 °C) (05/27/20 0701)  Pulse: 70 (05/27/20 0756)  Resp: 16 (05/27/20 0756)  BP: 132/83 (05/27/20 0701)  SpO2: (!) 93 % (05/27/20 0756) Vital Signs (24h Range):  Temp:  [97.1 °F (36.2 °C)-98 °F (36.7 °C)] 97.7 °F (36.5 °C)  Pulse:  [] 70  Resp:  [6-42] 16  SpO2:  [79 %-100 %] 93 %  BP: (113-195)/() 132/83     Weight: 64.4 kg (142 lb)  Body mass index is 25.15 kg/m².    SpO2: (!) 93 %  O2 Device (Oxygen Therapy): nasal cannula      Intake/Output Summary (Last 24 hours) at 5/27/2020 0850  Last data filed at 5/27/2020 0701  Gross per 24 hour   Intake 390 ml   Output 2580 ml   Net -2190 ml       Lines/Drains/Airways     Drain                 Urethral Catheter 05/26/20 1900 Latex 16 Fr. less than 1 day          Peripheral Intravenous Line                 Peripheral IV - Single Lumen 05/26/20 1646 18 G Left Antecubital less than 1 day         Peripheral IV - Single Lumen 05/26/20 1652 20 G Left Wrist less than 1 day                Physical Exam   Constitutional: She is oriented to person, place, and time. She appears well-developed and well-nourished. No distress.   HENT:   Head: Normocephalic and atraumatic.   Eyes: Pupils are equal, round, and reactive to light. Right eye exhibits no discharge. Left eye exhibits no discharge.   Neck: Neck supple. JVD present.   Cardiovascular: Normal rate, regular rhythm, S1 normal, S2 normal and normal heart sounds.   No murmur heard.  Pulmonary/Chest: Effort normal. No respiratory distress. She has no wheezes. She has rales (BLE).   Abdominal: Soft. She exhibits no distension.   Musculoskeletal: She exhibits edema.   Neurological: She is alert and oriented to person, place, and time.   Skin: Skin is warm and dry. She is not diaphoretic. No erythema.   Psychiatric: She has a normal mood and affect. Her behavior is normal. Thought content normal.   Nursing note and vitals reviewed.      Significant Labs:   [facility]   Recent Labs   Lab 05/26/20  1644  05/27/20  0340    141   K 4.6 3.6    101   CO2 22* 30*   * 114*   BUN 12 15   CREATININE 0.9 0.7   CALCIUM 9.2 8.7   PROT 8.1  --    ALBUMIN 4.3  --    BILITOT 0.9  --    ALKPHOS 127  --    AST 40  --    ALT 30  --    ANIONGAP 13 10   ESTGFRAFRICA >60 >60   EGFRNONAA >60 >60   , CBC   Recent Labs   Lab 05/26/20  1648 05/27/20  0340   WBC 12.44 5.13   HGB 18.0* 15.4   HCT 57.3* 47.5    170   , Troponin   Recent Labs   Lab 05/26/20  1648 05/26/20  1956   TROPONINI 0.007 0.029*    and All pertinent lab results from the last 24 hours have been reviewed.    Significant Imaging: Echocardiogram:   2D echo with color flow doppler:   Results for orders placed or performed in visit on 01/13/20   2D Echo w/ Color Flow Doppler   Result Value Ref Range    QEF 60 55 - 65    Mitral Valve Regurgitation MILD     Diastolic Dysfunction No     Est. PA Systolic Pressure 19.71     Narrative    Date of Procedure: 01/13/2020        TEST DESCRIPTION       Aorta: The aortic root is normal in size, measuring 2.8 cm at sinotubular junction and 3.2 cm at Sinuses of Valsalva. The proximal ascending aorta is normal in size, measuring 3.0 cm across.     Left Atrium: The left atrial volume index is normal, measuring 25.05 cc/m2.     Left Ventricle: The left ventricle is normal in size, with an end-diastolic diameter of 5.4 cm, and an end-systolic diameter of 4.4 cm. LV wall thickness is normal, with the septum measuring 1.1 cm and the posterior wall measuring 1.0 cm across. Relative   wall thickness was normal at 0.37, and the LV mass index was increased at 156.5 g/m2 consistent with eccentric left ventricular hypertrophy. There are no regional wall motion abnormalities. Left ventricular systolic function appears normal. Visually   estimated ejection fraction is 60-65%. The LV Doppler derived stroke volume equals 70.0 ccs.     Diastolic indices: E wave velocity 0.7 m/s, E/A ratio 0.7,  msec., E/e' ratio(avg) 10.  Diastolic function is normal.     Right Atrium: The right atrium is normal in size, measuring 3.9 cm in length and 3.2 cm in width in the apical view.     Right Ventricle: The right ventricle is normal in size measuring 2.9 cm at the base in the apical right ventricle-focused view. Global right ventricular systolic function appears normal. The estimated PA systolic pressure is greater than 20 mmHg.     Aortic Valve:  Aortic valve is normal in structure with normal leaflet mobility.     Mitral Valve:  Mitral valve is normal in structure with normal leaflet mobility. There is mild mitral regurgitation.     Tricuspid Valve:  Tricuspid valve is normal in structure with normal leaflet mobility.     Pulmonary Valve:  Pulmonary valve is normal in structure with normal leaflet mobility.     Intracavitary: There is no evidence of pericardial effusion, intracavity mass, thrombi, or vegetation.         CONCLUSIONS     1 - Eccentric hypertrophy.     2 - No wall motion abnormalities.     3 - Normal left ventricular systolic function (EF 60-65%).     4 - Normal left ventricular diastolic function.     5 - Normal right ventricular systolic function .     6 - The estimated PA systolic pressure is greater than 20 mmHg.     7 - Mild mitral regurgitation.             This document has been electronically    SIGNED BY: Sanjeev Prieto MD On: 01/14/2020 15:40   , EKG: Reviewed and X-Ray: CXR: X-Ray Chest 1 View (CXR): No results found for this visit on 05/26/20. and X-Ray Chest PA and Lateral (CXR): No results found for this visit on 05/26/20.    Assessment and Plan:   Patient who presents with decompensated CHF in setting of dietary indiscretion and missing doses of medication. Diuresing well since admission, give additional 20 mg of IV Lasix later this afternoon. Continue other meds. COPD tx as per critical care. Repeat echo pending.    * Acute exacerbation of CHF (congestive heart failure)  -History of systolic CHF/NICM although EF  60% by 2D echo 1/13/2020  -BNP > 2000  -CXR shows vascular congestion/pulmonary edema  -Patient admits to dietary non-compliance and missing some does of medications  -Diurese with additional 20 mg of IV Lasix later this afternoon  -Continue BB, ACEi  -Repeat echo pending    Elevated troponin  -Troponin 0.007>0.029  -Elevation secondary to demand ischemia from COPD exacerbation, HTN emergency, CHF  -No complaints of chest pain  -Continue ASA, BB, ACEi  -Check echo  -MPI stress test 10/19 negative for ischemia    Hypertensive emergency  -BP initially 190/130  -Improved since admission  -Continue Coreg, ACEi  -Titrate and adjust meds accordingly    COPD exacerbation  -Mgmt as per critical care team    PVC (premature ventricular contraction)  -Continue amiodarone, BB    Tobacco abuse  -Smoking cessation        VTE Risk Mitigation (From admission, onward)         Ordered     enoxaparin injection 40 mg  Daily      05/26/20 1954                Thank you for your consult. I will follow-up with patient. Please contact us if you have any additional questions.    Faustina Barajas PA-C  Cardiology   [facility]

## 2020-05-27 NOTE — EICU
Eicu brief admission review note.  Pt was examined on video, notes, images, labs  Reviewed.  A/P: Respiratory failure required bipap/AVASP 2/2 COPD/CHF exacerbation  COPD- Zithro, steroids, nebs, AVAPS  Ca=206 EPAP= 6 Rate= 25, I time=1,FiO2= 50%  CHF exacerbation in the setting of uncontrolled HTN, nitro qtt, lasix, bipap, repeat TTE  DVT proph- Lovenox

## 2020-05-27 NOTE — SUBJECTIVE & OBJECTIVE
Interval History: *   Review of Systems   Constitutional: Positive for malaise/fatigue.   Respiratory: Positive for shortness of breath.    Cardiovascular: Positive for orthopnea and leg swelling.   All other systems reviewed and are negative.       Devices: Oxygen   seen and examined: Off  BIPAP/AVAPS, on 2 LPM NC, ambulated well round unit with PT, Smoker 1 ppd x > 25 years, needs f/u pulmonary, Off NTG drip    Objective:     Vital Signs (Most Recent):  Temp: 97.8 °F (36.6 °C) (05/27/20 1101)  Pulse: 69 (05/27/20 1101)  Resp: 19 (05/27/20 1101)  BP: (!) 102/55 (05/27/20 1101)  SpO2: (!) 94 % (05/27/20 1101) Vital Signs (24h Range):  Temp:  [97.1 °F (36.2 °C)-98 °F (36.7 °C)] 97.8 °F (36.6 °C)  Pulse:  [] 69  Resp:  [6-42] 19  SpO2:  [79 %-100 %] 94 %  BP: (102-195)/() 102/55     Weight: 64.4 kg (142 lb)  Body mass index is 25.15 kg/m².      Intake/Output Summary (Last 24 hours) at 5/27/2020 1230  Last data filed at 5/27/2020 0701  Gross per 24 hour   Intake 390 ml   Output 2580 ml   Net -2190 ml       Physical Exam   Constitutional: She appears well-developed and well-nourished.   HENT:   Head: Normocephalic and atraumatic.   Eyes: Pupils are equal, round, and reactive to light. EOM are normal.   Neck: JVD present.   Cardiovascular: Normal heart sounds.   No murmur heard.  Pulmonary/Chest: Effort normal and breath sounds normal.   Abdominal: Bowel sounds are normal.   Musculoskeletal: She exhibits edema.   Neurological: She is alert.   Skin: Skin is warm and dry.   Nursing note and vitals reviewed.      Vents:  Oxygen Concentration (%): 50 (05/27/20 0315)    Lines/Drains/Airways     Drain                 Urethral Catheter 05/26/20 1900 Latex 16 Fr. less than 1 day          Peripheral Intravenous Line                 Peripheral IV - Single Lumen 05/26/20 1646 18 G Left Antecubital less than 1 day         Peripheral IV - Single Lumen 05/26/20 1652 20 G Left Wrist less than 1 day                 Significant Labs:    CBC/Anemia Profile:  Recent Labs   Lab 05/26/20 1648 05/27/20  0340   WBC 12.44 5.13   HGB 18.0* 15.4   HCT 57.3* 47.5    170   * 99*   RDW 13.2 12.8        Chemistries:  Recent Labs   Lab 05/26/20 1648 05/27/20  0340    141   K 4.6 3.6    101   CO2 22* 30*   BUN 12 15   CREATININE 0.9 0.7   CALCIUM 9.2 8.7   ALBUMIN 4.3  --    PROT 8.1  --    BILITOT 0.9  --    ALKPHOS 127  --    ALT 30  --    AST 40  --    MG  --  1.5*   PHOS  --  3.7       ABGs:   Recent Labs   Lab 05/26/20  1825   PH 7.277*   PCO2 55.7*   HCO3 26.0   POCSATURATED 94*   BE -1     Blood Culture:   Recent Labs   Lab 05/26/20  1650   LABBLOO No Growth to date  No Growth to date     CMP:   Recent Labs   Lab 05/26/20 1648 05/27/20  0340    141   K 4.6 3.6    101   CO2 22* 30*   * 114*   BUN 12 15   CREATININE 0.9 0.7   CALCIUM 9.2 8.7   PROT 8.1  --    ALBUMIN 4.3  --    BILITOT 0.9  --    ALKPHOS 127  --    AST 40  --    ALT 30  --    ANIONGAP 13 10   EGFRNONAA >60 >60     Cardiac Markers: No results for input(s): CKMB, TROPONINT, MYOGLOBIN in the last 48 hours.  Lactic Acid:   Recent Labs   Lab 05/26/20 1650   LACTATE 3.9*     POCT Glucose: No results for input(s): POCTGLUCOSE in the last 48 hours.  Respiratory Culture: No results for input(s): GSRESP, RESPIRATORYC in the last 48 hours.  Troponin:   Recent Labs   Lab 05/26/20 1648 05/26/20 1956   TROPONINI 0.007 0.029*     Urine Culture: No results for input(s): LABURIN in the last 48 hours.  All pertinent labs within the past 24 hours have been reviewed.    Significant Imaging:  I have reviewed and interpreted all pertinent imaging results/findings within the past 24 hours.   CXR  EXAMINATION:  XR CHEST AP PORTABLE    CLINICAL HISTORY:  Chest Pain;    TECHNIQUE:  Single frontal view of the chest was performed.    COMPARISON:  Chest radiograph 05/31/2013; CTA chest 05/31/2013    FINDINGS:  Cardiac leads project over the  chest.  Cardiomediastinal silhouette is enlarged, similar to the prior.  Mild perihilar vascular congestion with mild edema.  Hyperinflation of both lungs and prominence of the bronchovascular markings concerning for COPD.  Flattening of the diaphragms and chronic blunting of the bilateral costophrenic angles.  No pneumothorax.  Osseous structures appear intact.      Impression       Cardiomegaly and mild perihilar vascular congestion and edema.           ECHO  · Concentric left ventricular hypertrophy.  · Mildly decreased left ventricular systolic function. The estimated ejection fraction is 45%.  · Grade I (mild) left ventricular diastolic dysfunction consistent with impaired relaxation.  · Normal right ventricular systolic function.  · Global hypokinetic wall motion.  · Moderate left atrial enlargement.  Normal central venous pressure (3 mmHg).

## 2020-05-28 VITALS
OXYGEN SATURATION: 95 % | HEART RATE: 65 BPM | HEIGHT: 63 IN | SYSTOLIC BLOOD PRESSURE: 117 MMHG | DIASTOLIC BLOOD PRESSURE: 60 MMHG | TEMPERATURE: 96 F | RESPIRATION RATE: 16 BRPM | WEIGHT: 141.31 LBS | BODY MASS INDEX: 25.04 KG/M2

## 2020-05-28 PROBLEM — I16.1 HYPERTENSIVE EMERGENCY: Status: RESOLVED | Noted: 2020-05-26 | Resolved: 2020-05-28

## 2020-05-28 PROBLEM — I50.43 ACUTE ON CHRONIC COMBINED SYSTOLIC AND DIASTOLIC HEART FAILURE: Status: RESOLVED | Noted: 2020-05-27 | Resolved: 2020-05-28

## 2020-05-28 LAB
ANION GAP SERPL CALC-SCNC: 10 MMOL/L (ref 8–16)
BASOPHILS # BLD AUTO: 0.01 K/UL (ref 0–0.2)
BASOPHILS NFR BLD: 0.1 % (ref 0–1.9)
BNP SERPL-MCNC: 251 PG/ML (ref 0–99)
BUN SERPL-MCNC: 25 MG/DL (ref 6–20)
CALCIUM SERPL-MCNC: 9 MG/DL (ref 8.7–10.5)
CHLORIDE SERPL-SCNC: 102 MMOL/L (ref 95–110)
CO2 SERPL-SCNC: 27 MMOL/L (ref 23–29)
CREAT SERPL-MCNC: 0.8 MG/DL (ref 0.5–1.4)
DIFFERENTIAL METHOD: ABNORMAL
EOSINOPHIL # BLD AUTO: 0 K/UL (ref 0–0.5)
EOSINOPHIL NFR BLD: 0 % (ref 0–8)
ERYTHROCYTE [DISTWIDTH] IN BLOOD BY AUTOMATED COUNT: 13.2 % (ref 11.5–14.5)
EST. GFR  (AFRICAN AMERICAN): >60 ML/MIN/1.73 M^2
EST. GFR  (NON AFRICAN AMERICAN): >60 ML/MIN/1.73 M^2
GLUCOSE SERPL-MCNC: 97 MG/DL (ref 70–110)
HCT VFR BLD AUTO: 49.1 % (ref 37–48.5)
HGB BLD-MCNC: 15.5 G/DL (ref 12–16)
IMM GRANULOCYTES # BLD AUTO: 0.06 K/UL (ref 0–0.04)
IMM GRANULOCYTES NFR BLD AUTO: 0.5 % (ref 0–0.5)
LYMPHOCYTES # BLD AUTO: 1 K/UL (ref 1–4.8)
LYMPHOCYTES NFR BLD: 8.5 % (ref 18–48)
MAGNESIUM SERPL-MCNC: 2 MG/DL (ref 1.6–2.6)
MCH RBC QN AUTO: 31.8 PG (ref 27–31)
MCHC RBC AUTO-ENTMCNC: 31.6 G/DL (ref 32–36)
MCV RBC AUTO: 101 FL (ref 82–98)
MONOCYTES # BLD AUTO: 0.8 K/UL (ref 0.3–1)
MONOCYTES NFR BLD: 6.9 % (ref 4–15)
NEUTROPHILS # BLD AUTO: 10.3 K/UL (ref 1.8–7.7)
NEUTROPHILS NFR BLD: 84 % (ref 38–73)
NRBC BLD-RTO: 0 /100 WBC
PHOSPHATE SERPL-MCNC: 2.8 MG/DL (ref 2.7–4.5)
PLATELET # BLD AUTO: 174 K/UL (ref 150–350)
PMV BLD AUTO: 11.2 FL (ref 9.2–12.9)
POTASSIUM SERPL-SCNC: 4 MMOL/L (ref 3.5–5.1)
RBC # BLD AUTO: 4.87 M/UL (ref 4–5.4)
SODIUM SERPL-SCNC: 139 MMOL/L (ref 136–145)
WBC # BLD AUTO: 12.2 K/UL (ref 3.9–12.7)

## 2020-05-28 PROCEDURE — 25000003 PHARM REV CODE 250: Performed by: INTERNAL MEDICINE

## 2020-05-28 PROCEDURE — 85025 COMPLETE CBC W/AUTO DIFF WBC: CPT

## 2020-05-28 PROCEDURE — 63600175 PHARM REV CODE 636 W HCPCS: Performed by: INTERNAL MEDICINE

## 2020-05-28 PROCEDURE — 90471 IMMUNIZATION ADMIN: CPT | Performed by: INTERNAL MEDICINE

## 2020-05-28 PROCEDURE — 27000221 HC OXYGEN, UP TO 24 HOURS

## 2020-05-28 PROCEDURE — 36415 COLL VENOUS BLD VENIPUNCTURE: CPT

## 2020-05-28 PROCEDURE — 63700000 PHARM REV CODE 250 ALT 637 W/O HCPCS: Performed by: INTERNAL MEDICINE

## 2020-05-28 PROCEDURE — 83735 ASSAY OF MAGNESIUM: CPT

## 2020-05-28 PROCEDURE — 94640 AIRWAY INHALATION TREATMENT: CPT

## 2020-05-28 PROCEDURE — 99232 SBSQ HOSP IP/OBS MODERATE 35: CPT | Mod: ,,, | Performed by: INTERNAL MEDICINE

## 2020-05-28 PROCEDURE — 25000242 PHARM REV CODE 250 ALT 637 W/ HCPCS: Performed by: INTERNAL MEDICINE

## 2020-05-28 PROCEDURE — 80048 BASIC METABOLIC PNL TOTAL CA: CPT

## 2020-05-28 PROCEDURE — 99232 PR SUBSEQUENT HOSPITAL CARE,LEVL II: ICD-10-PCS | Mod: ,,, | Performed by: INTERNAL MEDICINE

## 2020-05-28 PROCEDURE — 84100 ASSAY OF PHOSPHORUS: CPT

## 2020-05-28 PROCEDURE — 83880 ASSAY OF NATRIURETIC PEPTIDE: CPT

## 2020-05-28 PROCEDURE — 94761 N-INVAS EAR/PLS OXIMETRY MLT: CPT

## 2020-05-28 PROCEDURE — 90670 PCV13 VACCINE IM: CPT | Performed by: INTERNAL MEDICINE

## 2020-05-28 RX ORDER — FUROSEMIDE 20 MG/1
20 TABLET ORAL DAILY
Qty: 30 TABLET | Refills: 0 | Status: SHIPPED | OUTPATIENT
Start: 2020-05-29 | End: 2020-06-03

## 2020-05-28 RX ORDER — FLUTICASONE PROPIONATE AND SALMETEROL XINAFOATE 115; 21 UG/1; UG/1
2 AEROSOL, METERED RESPIRATORY (INHALATION) EVERY 12 HOURS
Qty: 12 G | Refills: 0 | Status: SHIPPED | OUTPATIENT
Start: 2020-05-28 | End: 2021-10-06

## 2020-05-28 RX ORDER — FAMOTIDINE 20 MG/1
20 TABLET, FILM COATED ORAL 2 TIMES DAILY
Status: DISCONTINUED | OUTPATIENT
Start: 2020-05-28 | End: 2020-05-28 | Stop reason: HOSPADM

## 2020-05-28 RX ORDER — LISINOPRIL 10 MG/1
10 TABLET ORAL DAILY
Qty: 30 TABLET | Refills: 0
Start: 2020-05-29 | End: 2020-07-02

## 2020-05-28 RX ORDER — AZITHROMYCIN 250 MG/1
250 TABLET, FILM COATED ORAL DAILY
Qty: 4 TABLET | Refills: 0 | Status: SHIPPED | OUTPATIENT
Start: 2020-05-29 | End: 2020-06-03

## 2020-05-28 RX ORDER — PREDNISONE 20 MG/1
20 TABLET ORAL DAILY
Qty: 5 TABLET | Refills: 0 | Status: SHIPPED | OUTPATIENT
Start: 2020-05-29 | End: 2020-06-03

## 2020-05-28 RX ADMIN — PNEUMOCOCCAL 13-VALENT CONJUGATE VACCINE 0.5 ML: 2.2; 2.2; 2.2; 2.2; 2.2; 4.4; 2.2; 2.2; 2.2; 2.2; 2.2; 2.2; 2.2 INJECTION, SUSPENSION INTRAMUSCULAR at 02:05

## 2020-05-28 RX ADMIN — AMIODARONE HYDROCHLORIDE 200 MG: 200 TABLET ORAL at 09:05

## 2020-05-28 RX ADMIN — AZITHROMYCIN MONOHYDRATE 250 MG: 250 TABLET ORAL at 09:05

## 2020-05-28 RX ADMIN — FAMOTIDINE 20 MG: 20 TABLET ORAL at 09:05

## 2020-05-28 RX ADMIN — IPRATROPIUM BROMIDE AND ALBUTEROL SULFATE 3 ML: .5; 3 SOLUTION RESPIRATORY (INHALATION) at 01:05

## 2020-05-28 RX ADMIN — IPRATROPIUM BROMIDE AND ALBUTEROL SULFATE 3 ML: .5; 3 SOLUTION RESPIRATORY (INHALATION) at 07:05

## 2020-05-28 RX ADMIN — PREDNISONE 20 MG: 20 TABLET ORAL at 09:05

## 2020-05-28 RX ADMIN — LISINOPRIL 10 MG: 10 TABLET ORAL at 09:05

## 2020-05-28 RX ADMIN — ASPIRIN 81 MG: 81 TABLET, COATED ORAL at 09:05

## 2020-05-28 RX ADMIN — CARVEDILOL 25 MG: 12.5 TABLET, FILM COATED ORAL at 09:05

## 2020-05-28 RX ADMIN — FUROSEMIDE 20 MG: 20 TABLET ORAL at 09:05

## 2020-05-28 NOTE — HOSPITAL COURSE
5/28/2020-Patient seen and examined today, resting in bed. Feeling better. SOB improved. No chest pain. Labs reviewed. BNP down to 200. Echo showed EF of 45%, DD.

## 2020-05-28 NOTE — SUBJECTIVE & OBJECTIVE
Review of Systems   Constitution: Positive for malaise/fatigue.   HENT: Negative.    Eyes: Negative.    Cardiovascular: Positive for dyspnea on exertion (improved).   Respiratory: Positive for shortness of breath (improved).    Endocrine: Negative.    Hematologic/Lymphatic: Negative.    Skin: Negative.    Musculoskeletal: Negative.    Gastrointestinal: Negative.    Genitourinary: Negative.    Neurological: Negative.    Psychiatric/Behavioral: Negative.    Allergic/Immunologic: Negative.      Objective:     Vital Signs (Most Recent):  Temp: 96.4 °F (35.8 °C) (05/28/20 0729)  Pulse: 63 (05/28/20 1204)  Resp: 18 (05/28/20 1204)  BP: 117/60 (05/28/20 1204)  SpO2: (!) 93 % (05/28/20 1204) Vital Signs (24h Range):  Temp:  [96.4 °F (35.8 °C)-98.2 °F (36.8 °C)] 96.4 °F (35.8 °C)  Pulse:  [60-75] 63  Resp:  [14-19] 18  SpO2:  [90 %-96 %] 93 %  BP: ()/(40-98) 117/60     Weight: 64.1 kg (141 lb 5 oz)  Body mass index is 25.03 kg/m².     SpO2: (!) 93 %  O2 Device (Oxygen Therapy): nasal cannula      Intake/Output Summary (Last 24 hours) at 5/28/2020 1317  Last data filed at 5/28/2020 0021  Gross per 24 hour   Intake 1398 ml   Output 800 ml   Net 598 ml       Lines/Drains/Airways     Peripheral Intravenous Line                 Peripheral IV - Single Lumen 05/26/20 1646 18 G Left Antecubital 1 day         Peripheral IV - Single Lumen 05/26/20 1652 20 G Left Wrist 1 day                Physical Exam   Constitutional: She is oriented to person, place, and time. She appears well-developed and well-nourished. No distress.   On supplemental O2   HENT:   Head: Normocephalic and atraumatic.   Eyes: Pupils are equal, round, and reactive to light. Right eye exhibits no discharge. Left eye exhibits no discharge.   Neck: Neck supple. No JVD present.   Cardiovascular: Normal rate, regular rhythm, S1 normal, S2 normal and normal heart sounds.   No murmur heard.  Pulmonary/Chest: Effort normal. No respiratory distress. She has no  wheezes.   Coarse BS at bases   Abdominal: Soft. She exhibits no distension.   Musculoskeletal: She exhibits no edema.   Neurological: She is alert and oriented to person, place, and time.   Skin: Skin is warm and dry. She is not diaphoretic. No erythema.   Psychiatric: She has a normal mood and affect. Her behavior is normal. Thought content normal.   Nursing note and vitals reviewed.      Significant Labs:   CMP   Recent Labs   Lab 05/26/20  1648 05/27/20  0340 05/27/20  1310 05/28/20  0424    141 137 139   K 4.6 3.6 4.5 4.0    101 100 102   CO2 22* 30* 27 27   * 114* 130* 97   BUN 12 15 19 25*   CREATININE 0.9 0.7 0.9 0.8   CALCIUM 9.2 8.7 9.3 9.0   PROT 8.1  --  7.3  --    ALBUMIN 4.3  --  3.9  --    BILITOT 0.9  --  0.8  --    ALKPHOS 127  --  96  --    AST 40  --  26  --    ALT 30  --  32  --    ANIONGAP 13 10 10 10   ESTGFRAFRICA >60 >60 >60 >60   EGFRNONAA >60 >60 >60 >60   , CBC   Recent Labs   Lab 05/26/20 1648 05/27/20  0340 05/28/20  0424   WBC 12.44 5.13 12.20   HGB 18.0* 15.4 15.5   HCT 57.3* 47.5 49.1*    170 174   , Troponin   Recent Labs   Lab 05/26/20 1648 05/26/20  1956 05/27/20  1310   TROPONINI 0.007 0.029* 0.016    and All pertinent lab results from the last 24 hours have been reviewed.    Significant Imaging: Echocardiogram:   2D echo with color flow doppler:   Results for orders placed or performed in visit on 01/13/20   2D Echo w/ Color Flow Doppler   Result Value Ref Range    QEF 60 55 - 65    Mitral Valve Regurgitation MILD     Diastolic Dysfunction No     Est. PA Systolic Pressure 19.71     Narrative    Date of Procedure: 01/13/2020        TEST DESCRIPTION       Aorta: The aortic root is normal in size, measuring 2.8 cm at sinotubular junction and 3.2 cm at Sinuses of Valsalva. The proximal ascending aorta is normal in size, measuring 3.0 cm across.     Left Atrium: The left atrial volume index is normal, measuring 25.05 cc/m2.     Left Ventricle: The left  ventricle is normal in size, with an end-diastolic diameter of 5.4 cm, and an end-systolic diameter of 4.4 cm. LV wall thickness is normal, with the septum measuring 1.1 cm and the posterior wall measuring 1.0 cm across. Relative   wall thickness was normal at 0.37, and the LV mass index was increased at 156.5 g/m2 consistent with eccentric left ventricular hypertrophy. There are no regional wall motion abnormalities. Left ventricular systolic function appears normal. Visually   estimated ejection fraction is 60-65%. The LV Doppler derived stroke volume equals 70.0 ccs.     Diastolic indices: E wave velocity 0.7 m/s, E/A ratio 0.7,  msec., E/e' ratio(avg) 10. Diastolic function is normal.     Right Atrium: The right atrium is normal in size, measuring 3.9 cm in length and 3.2 cm in width in the apical view.     Right Ventricle: The right ventricle is normal in size measuring 2.9 cm at the base in the apical right ventricle-focused view. Global right ventricular systolic function appears normal. The estimated PA systolic pressure is greater than 20 mmHg.     Aortic Valve:  Aortic valve is normal in structure with normal leaflet mobility.     Mitral Valve:  Mitral valve is normal in structure with normal leaflet mobility. There is mild mitral regurgitation.     Tricuspid Valve:  Tricuspid valve is normal in structure with normal leaflet mobility.     Pulmonary Valve:  Pulmonary valve is normal in structure with normal leaflet mobility.     Intracavitary: There is no evidence of pericardial effusion, intracavity mass, thrombi, or vegetation.         CONCLUSIONS     1 - Eccentric hypertrophy.     2 - No wall motion abnormalities.     3 - Normal left ventricular systolic function (EF 60-65%).     4 - Normal left ventricular diastolic function.     5 - Normal right ventricular systolic function .     6 - The estimated PA systolic pressure is greater than 20 mmHg.     7 - Mild mitral regurgitation.             This  document has been electronically    SIGNED BY: Sanjeev Prieto MD On: 01/14/2020 15:40   , EKG: Reviewed and X-Ray: CXR: X-Ray Chest 1 View (CXR): No results found for this visit on 05/26/20. and X-Ray Chest PA and Lateral (CXR): No results found for this visit on 05/26/20.

## 2020-05-28 NOTE — ASSESSMENT & PLAN NOTE
-Continue IV diuresis , transition to oral once near euvolemic   -Continue BB, ACEi  -Low salt diet   -Fluid restriction 1.5 Liter /day   -Smoking cessation

## 2020-05-28 NOTE — ASSESSMENT & PLAN NOTE
- Troponin trended down to normal 0.029> 0.007  -No H/O CAD  -MPI stress test in 10/19 - negative for ischemia   -Optimize medical treatment for CHF

## 2020-05-28 NOTE — HPI
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 Non ischemic Cardiomyopathy, COPD, Current everyday smoker, Frequent PVC's , was admitted on 5/26/2020 in ICU for evaluation of increased SOB on exertion and laying flat  and lower ext edema . On initial presentation she was noted to be in  respiratory distress with hypercapnia required NIPPV via AVAPS , markedly elevated BP with evidence of pulmonary edema suggestive of hypertensive emergency required aggressive IV diuretic treatment and BP control with NTG drip. She also received treatment for COPD exacerbation with antibiotic , systemic steroid and inhalational bronchodilators . Pt admits to not being complaint with low salt diet and occasional skipping of home medications. On admit BNP was 2066 , initial troponin was 0.029. CXR resulted pulmonary edema and vascular congestion . Since admission , pt has responded to prescribed treatment with excellent diuresis , oxygen weaned down to NC at 3L/min and improved BP control with oral antihypertensives and weaned of NTG drip. Care stepped down to telemetry and Hospital Medicine is consulted to assume care . Today pt reports feeling better subjectively with improved SOB and lower ext edema . Denies chest pain , nausea , vomiting or diaphoresis.

## 2020-05-28 NOTE — SUBJECTIVE & OBJECTIVE
Past Medical History:   Diagnosis Date    Cardiomyopathy     CHF (congestive heart failure)     COPD exacerbation 5/26/2020    Hypertension        Past Surgical History:   Procedure Laterality Date    JOINT REPLACEMENT         Review of patient's allergies indicates:   Allergen Reactions    Pcn [penicillins]      Unknown reaction         No current facility-administered medications on file prior to encounter.      Current Outpatient Medications on File Prior to Encounter   Medication Sig    amiodarone (PACERONE) 200 MG Tab Take 1 tablet (200 mg total) by mouth once daily.    aspirin (ECOTRIN) 81 MG EC tablet Take 81 mg by mouth.    carvedilol (COREG) 25 MG tablet Take 1 tablet (25 mg total) by mouth 2 (two) times daily with meals.    folic acid (FOLVITE) 800 MCG Tab Take 400 mcg by mouth once daily.    INV lisinopril 10 MG Tab Take 1 tablet (10 mg total) by mouth 2 (two) times daily.    furosemide (LASIX) 20 MG tablet Take 1 tablet (20 mg total) by mouth once daily. PRN    potassium chloride SA (K-DUR,KLOR-CON) 20 MEQ tablet Take 1 tablet (20 mEq total) by mouth daily as needed. PRN, takes with lasix    thiamine (VITAMIN B-1) 100 MG tablet Take 250 mg by mouth once daily.     Family History     Problem Relation (Age of Onset)    Heart failure Mother    Hypertension Brother        Tobacco Use    Smoking status: Current Every Day Smoker     Packs/day: 1.00     Years: 32.00     Pack years: 32.00    Smokeless tobacco: Never Used   Substance and Sexual Activity    Alcohol use: Yes     Comment: Occassionally    Drug use: No    Sexual activity: Not on file     Review of Systems   Constitutional: Positive for activity change and fatigue. Negative for appetite change and fever.   HENT: Negative for sore throat.    Eyes: Negative for visual disturbance.   Respiratory: Positive for cough and shortness of breath (better ). Negative for chest tightness.    Cardiovascular: Positive for leg swelling (better).  Negative for chest pain and palpitations.   Gastrointestinal: Negative for abdominal distention, abdominal pain, constipation, diarrhea, nausea and vomiting.   Endocrine: Negative for polyuria.   Genitourinary: Negative for decreased urine volume, dysuria, flank pain, frequency and hematuria.   Musculoskeletal: Positive for back pain. Negative for gait problem.   Skin: Negative for rash.   Neurological: Negative for syncope, speech difficulty, weakness, light-headedness and headaches.   Psychiatric/Behavioral: Negative for confusion, hallucinations and sleep disturbance.     Objective:     Vital Signs (Most Recent):  Temp: 98.2 °F (36.8 °C) (05/27/20 1915)  Pulse: 68 (05/27/20 1942)  Resp: 14 (05/27/20 1942)  BP: (!) 107/57 (05/27/20 1900)  SpO2: (!) 94 % (05/27/20 1942) Vital Signs (24h Range):  Temp:  [97.5 °F (36.4 °C)-98.2 °F (36.8 °C)] 98.2 °F (36.8 °C)  Pulse:  [66-87] 68  Resp:  [6-26] 14  SpO2:  [88 %-99 %] 94 %  BP: ()/(40-99) 107/57     Weight: 64.4 kg (142 lb)  Body mass index is 25.15 kg/m².    Physical Exam   Constitutional: She is oriented to person, place, and time. She appears well-developed. No distress.   HENT:   Head: Normocephalic and atraumatic.   Mouth/Throat: No oropharyngeal exudate.   Eyes: Pupils are equal, round, and reactive to light. Conjunctivae and EOM are normal.   Neck: Normal range of motion. Neck supple. No JVD present. No thyromegaly present.   Cardiovascular: Normal rate, regular rhythm and normal heart sounds.   No murmur heard.  Pulmonary/Chest: Effort normal. No respiratory distress. She has wheezes (faint). She has rales (at bases ). She exhibits no tenderness.   Abdominal: Soft. Bowel sounds are normal. She exhibits no distension. There is no tenderness. There is no rebound and no guarding.   Musculoskeletal: Normal range of motion. She exhibits edema (1+ ).   Lymphadenopathy:     She has no cervical adenopathy.   Neurological: She is alert and oriented to person,  place, and time. She displays normal reflexes. No cranial nerve deficit or sensory deficit.   Skin: Skin is warm and dry. No rash noted. She is not diaphoretic.   Psychiatric: She has a normal mood and affect.       Significant Labs:   Results for orders placed or performed during the hospital encounter of 05/26/20   Blood Culture #2 **CANNOT BE ORDERED STAT**   Result Value Ref Range    Blood Culture, Routine No Growth to date    Blood Culture #1 **CANNOT BE ORDERED STAT**   Result Value Ref Range    Blood Culture, Routine No Growth to date    CBC auto differential   Result Value Ref Range    WBC 12.44 3.90 - 12.70 K/uL    RBC 5.55 (H) 4.00 - 5.40 M/uL    Hemoglobin 18.0 (H) 12.0 - 16.0 g/dL    Hematocrit 57.3 (H) 37.0 - 48.5 %    Mean Corpuscular Volume 103 (H) 82 - 98 fL    Mean Corpuscular Hemoglobin 32.4 (H) 27.0 - 31.0 pg    Mean Corpuscular Hemoglobin Conc 31.4 (L) 32.0 - 36.0 g/dL    RDW 13.2 11.5 - 14.5 %    Platelets 197 150 - 350 K/uL    MPV 10.6 9.2 - 12.9 fL    Immature Granulocytes 0.3 0.0 - 0.5 %    Gran # (ANC) 7.2 1.8 - 7.7 K/uL    Immature Grans (Abs) 0.04 0.00 - 0.04 K/uL    Lymph # 4.0 1.0 - 4.8 K/uL    Mono # 0.8 0.3 - 1.0 K/uL    Eos # 0.4 0.0 - 0.5 K/uL    Baso # 0.06 0.00 - 0.20 K/uL    nRBC 0 0 /100 WBC    Gran% 58.0 38.0 - 73.0 %    Lymph% 32.2 18.0 - 48.0 %    Mono% 6.2 4.0 - 15.0 %    Eosinophil% 2.8 0.0 - 8.0 %    Basophil% 0.5 0.0 - 1.9 %    Differential Method Automated    Comprehensive metabolic panel   Result Value Ref Range    Sodium 140 136 - 145 mmol/L    Potassium 4.6 3.5 - 5.1 mmol/L    Chloride 105 95 - 110 mmol/L    CO2 22 (L) 23 - 29 mmol/L    Glucose 153 (H) 70 - 110 mg/dL    BUN, Bld 12 6 - 20 mg/dL    Creatinine 0.9 0.5 - 1.4 mg/dL    Calcium 9.2 8.7 - 10.5 mg/dL    Total Protein 8.1 6.0 - 8.4 g/dL    Albumin 4.3 3.5 - 5.2 g/dL    Total Bilirubin 0.9 0.1 - 1.0 mg/dL    Alkaline Phosphatase 127 55 - 135 U/L    AST 40 10 - 40 U/L    ALT 30 10 - 44 U/L    Anion Gap 13 8 - 16  mmol/L    eGFR if African American >60 >60 mL/min/1.73 m^2    eGFR if non African American >60 >60 mL/min/1.73 m^2   Troponin I #1   Result Value Ref Range    Troponin I 0.007 0.000 - 0.026 ng/mL   B-Type natriuretic peptide (BNP)   Result Value Ref Range    BNP 2,066 (H) 0 - 99 pg/mL   COVID-19 Rapid Screening   Result Value Ref Range    SARS-CoV-2 RNA, Amplification, Qual Negative Negative   Lactic acid, plasma   Result Value Ref Range    Lactate (Lactic Acid) 3.9 (HH) 0.5 - 2.2 mmol/L   D dimer, quantitative   Result Value Ref Range    D-Dimer 0.70 (H) <0.50 mg/L FEU   Troponin I   Result Value Ref Range    Troponin I 0.029 (H) 0.000 - 0.026 ng/mL   Procalcitonin   Result Value Ref Range    Procalcitonin 0.07 <0.25 ng/mL   CBC auto differential   Result Value Ref Range    WBC 5.13 3.90 - 12.70 K/uL    RBC 4.79 4.00 - 5.40 M/uL    Hemoglobin 15.4 12.0 - 16.0 g/dL    Hematocrit 47.5 37.0 - 48.5 %    Mean Corpuscular Volume 99 (H) 82 - 98 fL    Mean Corpuscular Hemoglobin 32.2 (H) 27.0 - 31.0 pg    Mean Corpuscular Hemoglobin Conc 32.4 32.0 - 36.0 g/dL    RDW 12.8 11.5 - 14.5 %    Platelets 170 150 - 350 K/uL    MPV 10.8 9.2 - 12.9 fL    Immature Granulocytes 0.4 0.0 - 0.5 %    Gran # (ANC) 4.7 1.8 - 7.7 K/uL    Immature Grans (Abs) 0.02 0.00 - 0.04 K/uL    Lymph # 0.4 (L) 1.0 - 4.8 K/uL    Mono # 0.0 (L) 0.3 - 1.0 K/uL    Eos # 0.0 0.0 - 0.5 K/uL    Baso # 0.01 0.00 - 0.20 K/uL    nRBC 0 0 /100 WBC    Gran% 92.0 (H) 38.0 - 73.0 %    Lymph% 6.8 (L) 18.0 - 48.0 %    Mono% 0.6 (L) 4.0 - 15.0 %    Eosinophil% 0.0 0.0 - 8.0 %    Basophil% 0.2 0.0 - 1.9 %    Differential Method Automated    Basic metabolic panel   Result Value Ref Range    Sodium 141 136 - 145 mmol/L    Potassium 3.6 3.5 - 5.1 mmol/L    Chloride 101 95 - 110 mmol/L    CO2 30 (H) 23 - 29 mmol/L    Glucose 114 (H) 70 - 110 mg/dL    BUN, Bld 15 6 - 20 mg/dL    Creatinine 0.7 0.5 - 1.4 mg/dL    Calcium 8.7 8.7 - 10.5 mg/dL    Anion Gap 10 8 - 16 mmol/L     eGFR if African American >60 >60 mL/min/1.73 m^2    eGFR if non African American >60 >60 mL/min/1.73 m^2   Magnesium   Result Value Ref Range    Magnesium 1.5 (L) 1.6 - 2.6 mg/dL   Phosphorus   Result Value Ref Range    Phosphorus 3.7 2.7 - 4.5 mg/dL   Lactic Acid, Plasma   Result Value Ref Range    Lactate (Lactic Acid) 1.2 0.5 - 2.2 mmol/L   Comprehensive metabolic panel   Result Value Ref Range    Sodium 137 136 - 145 mmol/L    Potassium 4.5 3.5 - 5.1 mmol/L    Chloride 100 95 - 110 mmol/L    CO2 27 23 - 29 mmol/L    Glucose 130 (H) 70 - 110 mg/dL    BUN, Bld 19 6 - 20 mg/dL    Creatinine 0.9 0.5 - 1.4 mg/dL    Calcium 9.3 8.7 - 10.5 mg/dL    Total Protein 7.3 6.0 - 8.4 g/dL    Albumin 3.9 3.5 - 5.2 g/dL    Total Bilirubin 0.8 0.1 - 1.0 mg/dL    Alkaline Phosphatase 96 55 - 135 U/L    AST 26 10 - 40 U/L    ALT 32 10 - 44 U/L    Anion Gap 10 8 - 16 mmol/L    eGFR if African American >60 >60 mL/min/1.73 m^2    eGFR if non African American >60 >60 mL/min/1.73 m^2   Magnesium   Result Value Ref Range    Magnesium 2.1 1.6 - 2.6 mg/dL   Troponin I   Result Value Ref Range    Troponin I 0.016 0.000 - 0.026 ng/mL   Echo Color Flow Doppler? Yes; Bubble Contrast? No   Result Value Ref Range    BSA 1.69 m2    TDI SEPTAL 0.06 m/s    LV LATERAL E/E' RATIO 13.43 m/s    LV SEPTAL E/E' RATIO 15.67 m/s    TDI LATERAL 0.07 m/s    LVIDD 5.29 3.5 - 6.0 cm    IVS 1.37 (A) 0.6 - 1.1 cm    PW 1.35 (A) 0.6 - 1.1 cm    Ao root annulus 3.42 cm    LVIDS 4.22 (A) 2.1 - 4.0 cm    FS 20 28 - 44 %    Sinus 3.68 cm    STJ 3.51 cm    Ascending aorta 3.28 cm    LV mass 305.02 g    LA size 4.12 cm    TAPSE 2.23 cm    Left Ventricle Relative Wall Thickness 0.51 cm    AV mean gradient 7 mmHg    AV valve area 2.37 cm2    AV Velocity Ratio 0.62     AV index (prosthetic) 0.68     E/A ratio 0.81     Mean e' 0.07 m/s    E wave decelartion time 307.98 msec    IVRT 97.05 msec    LVOT diameter 2.10 cm    LVOT area 3.5 cm2    LVOT peak anabelle 1.09 m/s    LVOT  peak VTI 21.91 cm    Ao peak fermin 1.77 m/s    Ao VTI 32.00 cm    LVOT stroke volume 75.85 cm3    AV peak gradient 13 mmHg    E/E' ratio 14.46 m/s    MV Peak E Fermin 0.94 m/s    MV Peak A Fermin 1.16 m/s    LV Systolic Volume 79.37 mL    LV Systolic Volume Index 47.5 mL/m2    LV Diastolic Volume 134.48 mL    LV Diastolic Volume Index 80.44 mL/m2    LV Mass Index 182 g/m2    RA Major Axis 3.84 cm    Left Atrium Minor Axis 3.71 cm    Left Atrium Major Axis 6.38 cm    Right Atrial Pressure (from IVC) 3 mmHg   ISTAT PROCEDURE   Result Value Ref Range    POC PH 7.179 (LL) 7.35 - 7.45    POC PCO2 71.5 (HH) 35 - 45 mmHg    POC PO2 94 80 - 100 mmHg    POC HCO3 26.6 24 - 28 mmol/L    POC BE -2 -2 to 2 mmol/L    POC SATURATED O2 95 95 - 100 %    Rate 12     Sample ARTERIAL     Site RR     Allens Test Pass     DelSys CPAP/BiPAP     Mode BiPAP     FiO2 80     IP 12     EP 6    ISTAT PROCEDURE   Result Value Ref Range    POC PH 7.277 (LL) 7.35 - 7.45    POC PCO2 55.7 (H) 35 - 45 mmHg    POC PO2 83 80 - 100 mmHg    POC HCO3 26.0 24 - 28 mmol/L    POC BE -1 -2 to 2 mmol/L    POC SATURATED O2 94 (L) 95 - 100 %    Rate 25     Sample ARTERIAL     Site RR     Allens Test Pass     DelSys CPAP/BiPAP     Mode AVAPS     Vt 350     FiO2 65          Significant Imaging:   Imaging Results          X-Ray Chest AP Portable (Final result)  Result time 05/26/20 16:58:37    Final result by Cooper Oswald MD (05/26/20 16:58:37)                 Impression:      Cardiomegaly and mild perihilar vascular congestion and edema.      Electronically signed by: Cooper Oswald  Date:    05/26/2020  Time:    16:58             Narrative:    EXAMINATION:  XR CHEST AP PORTABLE    CLINICAL HISTORY:  Chest Pain;    TECHNIQUE:  Single frontal view of the chest was performed.    COMPARISON:  Chest radiograph 05/31/2013; CTA chest 05/31/2013    FINDINGS:  Cardiac leads project over the chest.  Cardiomediastinal silhouette is enlarged, similar to the prior.  Mild  perihilar vascular congestion with mild edema.  Hyperinflation of both lungs and prominence of the bronchovascular markings concerning for COPD.  Flattening of the diaphragms and chronic blunting of the bilateral costophrenic angles.  No pneumothorax.  Osseous structures appear intact.

## 2020-05-28 NOTE — PLAN OF CARE
POC reviewed. Telemetry monitoring NSR. Daily weights. Monitor intake and output. Comfort measures and safety measures addressed. Will continue to monitor.

## 2020-05-28 NOTE — ASSESSMENT & PLAN NOTE
- BP on presentation was 190/130 with evidence of pulmonary edema , elevated troponin and respiratory distress   -Initially treated with NTG drip   -Currently transitioned to oral ACEi and BB   -Further adjustment of oral anti hypertensive with goal BP < 140/90  - Noted no concomitant Diabetes or Renal disease

## 2020-05-28 NOTE — PROGRESS NOTES
[facility]  Cardiology  Progress Note    Patient Name: Melissa Petty  MRN: [id number]  Admission Date: 5/26/2020  Hospital Length of Stay: 2 days  Code Status: Full Code   Attending Physician: Jeevan Marin MD   Primary Care Physician: Primary Doctor No  Expected Discharge Date: 5/28/2020  Principal Problem:Acute on chronic combined systolic and diastolic heart failure    Subjective:   HPI:  Ms. Petty is a 55 year old female patient whose current medical conditions include systolic CHF/dilated NICM, tobacco abuse, and frequent PVC's who presented to [facility] ED yesterday with a chief complaint of worsening SOB that onset the prior evening. Associated symptoms included orthopnea and BLE edema. Patient denied any associated fever, chills, chest pain, PND, palpitations, near syncope, or syncope. Initial workup revealed BNP > 2000, lactic acid of 3.9, and elevated BP (190/130). CXR showed findings consistent with pulmonary edema/vascular congestion. Patient was noted to be in respiratory distress with hypercapnia and was subsequently admitted to ICU on AVAPS. Cardiology consulted to assist with management. Patient seen and examined today. Feeling better, SOB improved. Admits to indulging in salty foods and states she missed some doses of her medications. Excellent diuresis since admission. Chart reviewed.  Echo 1/13/2020 showed normal EF, no WMA. Repeat pending. Prior MPI stress test in 10/19 negative for ischemia/injury. Troponin 0.007>0.029.    Hospital Course:   5/28/2020-Patient seen and examined today, resting in bed. Feeling better. SOB improved. No chest pain. Labs reviewed. BNP down to 200. Echo showed EF of 45%, DD.        Review of Systems   Constitution: Positive for malaise/fatigue.   HENT: Negative.    Eyes: Negative.    Cardiovascular: Positive for dyspnea on exertion (improved).   Respiratory: Positive for shortness of breath (improved).    Endocrine: Negative.    Hematologic/Lymphatic:  Negative.    Skin: Negative.    Musculoskeletal: Negative.    Gastrointestinal: Negative.    Genitourinary: Negative.    Neurological: Negative.    Psychiatric/Behavioral: Negative.    Allergic/Immunologic: Negative.      Objective:     Vital Signs (Most Recent):  Temp: 96.4 °F (35.8 °C) (05/28/20 0729)  Pulse: 63 (05/28/20 1204)  Resp: 18 (05/28/20 1204)  BP: 117/60 (05/28/20 1204)  SpO2: (!) 93 % (05/28/20 1204) Vital Signs (24h Range):  Temp:  [96.4 °F (35.8 °C)-98.2 °F (36.8 °C)] 96.4 °F (35.8 °C)  Pulse:  [60-75] 63  Resp:  [14-19] 18  SpO2:  [90 %-96 %] 93 %  BP: ()/(40-98) 117/60     Weight: 64.1 kg (141 lb 5 oz)  Body mass index is 25.03 kg/m².     SpO2: (!) 93 %  O2 Device (Oxygen Therapy): nasal cannula      Intake/Output Summary (Last 24 hours) at 5/28/2020 1317  Last data filed at 5/28/2020 0021  Gross per 24 hour   Intake 1398 ml   Output 800 ml   Net 598 ml       Lines/Drains/Airways     Peripheral Intravenous Line                 Peripheral IV - Single Lumen 05/26/20 1646 18 G Left Antecubital 1 day         Peripheral IV - Single Lumen 05/26/20 1652 20 G Left Wrist 1 day                Physical Exam   Constitutional: She is oriented to person, place, and time. She appears well-developed and well-nourished. No distress.   On supplemental O2   HENT:   Head: Normocephalic and atraumatic.   Eyes: Pupils are equal, round, and reactive to light. Right eye exhibits no discharge. Left eye exhibits no discharge.   Neck: Neck supple. No JVD present.   Cardiovascular: Normal rate, regular rhythm, S1 normal, S2 normal and normal heart sounds.   No murmur heard.  Pulmonary/Chest: Effort normal. No respiratory distress. She has no wheezes.   Coarse BS at bases   Abdominal: Soft. She exhibits no distension.   Musculoskeletal: She exhibits no edema.   Neurological: She is alert and oriented to person, place, and time.   Skin: Skin is warm and dry. She is not diaphoretic. No erythema.   Psychiatric: She has a  normal mood and affect. Her behavior is normal. Thought content normal.   Nursing note and vitals reviewed.      Significant Labs:   CMP   Recent Labs   Lab 05/26/20  1648 05/27/20  0340 05/27/20  1310 05/28/20  0424    141 137 139   K 4.6 3.6 4.5 4.0    101 100 102   CO2 22* 30* 27 27   * 114* 130* 97   BUN 12 15 19 25*   CREATININE 0.9 0.7 0.9 0.8   CALCIUM 9.2 8.7 9.3 9.0   PROT 8.1  --  7.3  --    ALBUMIN 4.3  --  3.9  --    BILITOT 0.9  --  0.8  --    ALKPHOS 127  --  96  --    AST 40  --  26  --    ALT 30  --  32  --    ANIONGAP 13 10 10 10   ESTGFRAFRICA >60 >60 >60 >60   EGFRNONAA >60 >60 >60 >60   , CBC   Recent Labs   Lab 05/26/20 1648 05/27/20  0340 05/28/20  0424   WBC 12.44 5.13 12.20   HGB 18.0* 15.4 15.5   HCT 57.3* 47.5 49.1*    170 174   , Troponin   Recent Labs   Lab 05/26/20 1648 05/26/20  1956 05/27/20  1310   TROPONINI 0.007 0.029* 0.016    and All pertinent lab results from the last 24 hours have been reviewed.    Significant Imaging: Echocardiogram:   2D echo with color flow doppler:   Results for orders placed or performed in visit on 01/13/20   2D Echo w/ Color Flow Doppler   Result Value Ref Range    QEF 60 55 - 65    Mitral Valve Regurgitation MILD     Diastolic Dysfunction No     Est. PA Systolic Pressure 19.71     Narrative    Date of Procedure: 01/13/2020        TEST DESCRIPTION       Aorta: The aortic root is normal in size, measuring 2.8 cm at sinotubular junction and 3.2 cm at Sinuses of Valsalva. The proximal ascending aorta is normal in size, measuring 3.0 cm across.     Left Atrium: The left atrial volume index is normal, measuring 25.05 cc/m2.     Left Ventricle: The left ventricle is normal in size, with an end-diastolic diameter of 5.4 cm, and an end-systolic diameter of 4.4 cm. LV wall thickness is normal, with the septum measuring 1.1 cm and the posterior wall measuring 1.0 cm across. Relative   wall thickness was normal at 0.37, and the LV mass  index was increased at 156.5 g/m2 consistent with eccentric left ventricular hypertrophy. There are no regional wall motion abnormalities. Left ventricular systolic function appears normal. Visually   estimated ejection fraction is 60-65%. The LV Doppler derived stroke volume equals 70.0 ccs.     Diastolic indices: E wave velocity 0.7 m/s, E/A ratio 0.7,  msec., E/e' ratio(avg) 10. Diastolic function is normal.     Right Atrium: The right atrium is normal in size, measuring 3.9 cm in length and 3.2 cm in width in the apical view.     Right Ventricle: The right ventricle is normal in size measuring 2.9 cm at the base in the apical right ventricle-focused view. Global right ventricular systolic function appears normal. The estimated PA systolic pressure is greater than 20 mmHg.     Aortic Valve:  Aortic valve is normal in structure with normal leaflet mobility.     Mitral Valve:  Mitral valve is normal in structure with normal leaflet mobility. There is mild mitral regurgitation.     Tricuspid Valve:  Tricuspid valve is normal in structure with normal leaflet mobility.     Pulmonary Valve:  Pulmonary valve is normal in structure with normal leaflet mobility.     Intracavitary: There is no evidence of pericardial effusion, intracavity mass, thrombi, or vegetation.         CONCLUSIONS     1 - Eccentric hypertrophy.     2 - No wall motion abnormalities.     3 - Normal left ventricular systolic function (EF 60-65%).     4 - Normal left ventricular diastolic function.     5 - Normal right ventricular systolic function .     6 - The estimated PA systolic pressure is greater than 20 mmHg.     7 - Mild mitral regurgitation.             This document has been electronically    SIGNED BY: Sanjeev Prieto MD On: 01/14/2020 15:40   , EKG: Reviewed and X-Ray: CXR: X-Ray Chest 1 View (CXR): No results found for this visit on 05/26/20. and X-Ray Chest PA and Lateral (CXR): No results found for this visit on  05/26/20.    Assessment and Plan:   Patient who presents with SOB in setting of decompensated CHF and COPD exacerbation. Clinically improved. Follow-up in clinic. Counseled on med and dietary compliance.    Elevated troponin  -Troponin 0.007>0.029>0.016  -Elevation secondary to demand ischemia from COPD exacerbation, HTN emergency, CHF  -No complaints of chest pain  -Continue ASA, BB, ACEi  -Check echo  -MPI stress test 10/19 negative for ischemia    COPD exacerbation  -Mgmt as per critical care team    PVC (premature ventricular contraction)  -Continue amiodarone, BB    Tobacco abuse  -Smoking cessation        VTE Risk Mitigation (From admission, onward)         Ordered     enoxaparin injection 40 mg  Daily      05/26/20 1954                Faustina Barajas PA-C  Cardiology  [facility]

## 2020-05-28 NOTE — NURSING TRANSFER
Nursing Transfer Note      5/27/2020     Transfer To: [id number]    Transfer via wheelchair    Transfer with 3L NC to O2    Transported by MARITA Andrade RN    Medicines sent: N/A    Chart send with patient: Yes    Notified: spouse    Patient reassessed at: 5/27/20, 2020 (date, time)    Upon arrival to floor: cardiac monitor applied, patient oriented to room, call bell in reach and bed in lowest position    Transfer of patient care at bedside to [facility]. Pt tolerated well.

## 2020-05-28 NOTE — ASSESSMENT & PLAN NOTE
- Supplemental O2 to keep O2sat >90% given H/O COPD and hypercapnia   -Continue antibiotic Zithromax , oral steroid   -Inhalational bronchodilators

## 2020-05-28 NOTE — CONSULTS
[facility] Hospital Medicine  Consult Note    Patient Name: Melissa Petty  MRN: [id number]  Admission Date: 5/26/2020  Hospital Length of Stay: 1 days  Attending Physician: Jeevan Marin MD   Primary Care Provider: Primary Doctor No           Patient information was obtained from patient, past medical records and ER records.     Consults  Subjective:     Principal Problem: Acute on chronic combined systolic and diastolic heart failure    Chief Complaint:   Chief Complaint   Patient presents with    Shortness of Breath     started last night, sent over by urgent care.         HPI: The pt is a 54 yo female with PMHx of HTN, Combined systolic and diastolic CHF(HFrEF 45% per  Echo on 5/27/20) . Non ischemic Cardiomyopathy, COPD, Current everyday smoker, Frequent PVC's , was admitted on 5/26/2020 in ICU for evaluation of increased SOB on exertion and laying flat  and lower ext edema . On initial presentation she was noted to be in  respiratory distress with hypercapnia required NIPPV via AVAPS , markedly elevated BP with evidence of pulmonary edema suggestive of hypertensive emergency required aggressive IV diuretic treatment and BP control with NTG drip. She also received treatment for COPD exacerbation with antibiotic , systemic steroid and inhalational bronchodilators . Pt admits to not being complaint with low salt diet and occasional skipping of home medications. On admit BNP was 2066 , initial troponin was 0.029. CXR resulted pulmonary edema and vascular congestion . Since admission , pt has responded to prescribed treatment with excellent diuresis , oxygen weaned down to NC at 3L/min and improved BP control with oral antihypertensives and weaned of NTG drip. Care stepped down to telemetry and Hospital Medicine is consulted to assume care . Today pt reports feeling better subjectively with improved SOB and lower ext edema . Denies chest pain , nausea , vomiting or diaphoresis.     Past Medical  History:   Diagnosis Date    Cardiomyopathy     CHF (congestive heart failure)     COPD exacerbation 5/26/2020    Hypertension        Past Surgical History:   Procedure Laterality Date    JOINT REPLACEMENT         Review of patient's allergies indicates:   Allergen Reactions    Pcn [penicillins]      Unknown reaction         No current facility-administered medications on file prior to encounter.      Current Outpatient Medications on File Prior to Encounter   Medication Sig    amiodarone (PACERONE) 200 MG Tab Take 1 tablet (200 mg total) by mouth once daily.    aspirin (ECOTRIN) 81 MG EC tablet Take 81 mg by mouth.    carvedilol (COREG) 25 MG tablet Take 1 tablet (25 mg total) by mouth 2 (two) times daily with meals.    folic acid (FOLVITE) 800 MCG Tab Take 400 mcg by mouth once daily.    INV lisinopril 10 MG Tab Take 1 tablet (10 mg total) by mouth 2 (two) times daily.    furosemide (LASIX) 20 MG tablet Take 1 tablet (20 mg total) by mouth once daily. PRN    potassium chloride SA (K-DUR,KLOR-CON) 20 MEQ tablet Take 1 tablet (20 mEq total) by mouth daily as needed. PRN, takes with lasix    thiamine (VITAMIN B-1) 100 MG tablet Take 250 mg by mouth once daily.     Family History     Problem Relation (Age of Onset)    Heart failure Mother    Hypertension Brother        Tobacco Use    Smoking status: Current Every Day Smoker     Packs/day: 1.00     Years: 32.00     Pack years: 32.00    Smokeless tobacco: Never Used   Substance and Sexual Activity    Alcohol use: Yes     Comment: Occassionally    Drug use: No    Sexual activity: Not on file     Review of Systems   Constitutional: Positive for activity change and fatigue. Negative for appetite change and fever.   HENT: Negative for sore throat.    Eyes: Negative for visual disturbance.   Respiratory: Positive for cough and shortness of breath (better ). Negative for chest tightness.    Cardiovascular: Positive for leg swelling (better). Negative for  chest pain and palpitations.   Gastrointestinal: Negative for abdominal distention, abdominal pain, constipation, diarrhea, nausea and vomiting.   Endocrine: Negative for polyuria.   Genitourinary: Negative for decreased urine volume, dysuria, flank pain, frequency and hematuria.   Musculoskeletal: Positive for back pain. Negative for gait problem.   Skin: Negative for rash.   Neurological: Negative for syncope, speech difficulty, weakness, light-headedness and headaches.   Psychiatric/Behavioral: Negative for confusion, hallucinations and sleep disturbance.     Objective:     Vital Signs (Most Recent):  Temp: 98.2 °F (36.8 °C) (05/27/20 1915)  Pulse: 68 (05/27/20 1942)  Resp: 14 (05/27/20 1942)  BP: (!) 107/57 (05/27/20 1900)  SpO2: (!) 94 % (05/27/20 1942) Vital Signs (24h Range):  Temp:  [97.5 °F (36.4 °C)-98.2 °F (36.8 °C)] 98.2 °F (36.8 °C)  Pulse:  [66-87] 68  Resp:  [6-26] 14  SpO2:  [88 %-99 %] 94 %  BP: ()/(40-99) 107/57     Weight: 64.4 kg (142 lb)  Body mass index is 25.15 kg/m².    Physical Exam   Constitutional: She is oriented to person, place, and time. She appears well-developed. No distress.   HENT:   Head: Normocephalic and atraumatic.   Mouth/Throat: No oropharyngeal exudate.   Eyes: Pupils are equal, round, and reactive to light. Conjunctivae and EOM are normal.   Neck: Normal range of motion. Neck supple. No JVD present. No thyromegaly present.   Cardiovascular: Normal rate, regular rhythm and normal heart sounds.   No murmur heard.  Pulmonary/Chest: Effort normal. No respiratory distress. She has wheezes (faint). She has rales (at bases ). She exhibits no tenderness.   Abdominal: Soft. Bowel sounds are normal. She exhibits no distension. There is no tenderness. There is no rebound and no guarding.   Musculoskeletal: Normal range of motion. She exhibits edema (1+ ).   Lymphadenopathy:     She has no cervical adenopathy.   Neurological: She is alert and oriented to person, place, and time.  She displays normal reflexes. No cranial nerve deficit or sensory deficit.   Skin: Skin is warm and dry. No rash noted. She is not diaphoretic.   Psychiatric: She has a normal mood and affect.       Significant Labs:   Results for orders placed or performed during the hospital encounter of 05/26/20   Blood Culture #2 **CANNOT BE ORDERED STAT**   Result Value Ref Range    Blood Culture, Routine No Growth to date    Blood Culture #1 **CANNOT BE ORDERED STAT**   Result Value Ref Range    Blood Culture, Routine No Growth to date    CBC auto differential   Result Value Ref Range    WBC 12.44 3.90 - 12.70 K/uL    RBC 5.55 (H) 4.00 - 5.40 M/uL    Hemoglobin 18.0 (H) 12.0 - 16.0 g/dL    Hematocrit 57.3 (H) 37.0 - 48.5 %    Mean Corpuscular Volume 103 (H) 82 - 98 fL    Mean Corpuscular Hemoglobin 32.4 (H) 27.0 - 31.0 pg    Mean Corpuscular Hemoglobin Conc 31.4 (L) 32.0 - 36.0 g/dL    RDW 13.2 11.5 - 14.5 %    Platelets 197 150 - 350 K/uL    MPV 10.6 9.2 - 12.9 fL    Immature Granulocytes 0.3 0.0 - 0.5 %    Gran # (ANC) 7.2 1.8 - 7.7 K/uL    Immature Grans (Abs) 0.04 0.00 - 0.04 K/uL    Lymph # 4.0 1.0 - 4.8 K/uL    Mono # 0.8 0.3 - 1.0 K/uL    Eos # 0.4 0.0 - 0.5 K/uL    Baso # 0.06 0.00 - 0.20 K/uL    nRBC 0 0 /100 WBC    Gran% 58.0 38.0 - 73.0 %    Lymph% 32.2 18.0 - 48.0 %    Mono% 6.2 4.0 - 15.0 %    Eosinophil% 2.8 0.0 - 8.0 %    Basophil% 0.5 0.0 - 1.9 %    Differential Method Automated    Comprehensive metabolic panel   Result Value Ref Range    Sodium 140 136 - 145 mmol/L    Potassium 4.6 3.5 - 5.1 mmol/L    Chloride 105 95 - 110 mmol/L    CO2 22 (L) 23 - 29 mmol/L    Glucose 153 (H) 70 - 110 mg/dL    BUN, Bld 12 6 - 20 mg/dL    Creatinine 0.9 0.5 - 1.4 mg/dL    Calcium 9.2 8.7 - 10.5 mg/dL    Total Protein 8.1 6.0 - 8.4 g/dL    Albumin 4.3 3.5 - 5.2 g/dL    Total Bilirubin 0.9 0.1 - 1.0 mg/dL    Alkaline Phosphatase 127 55 - 135 U/L    AST 40 10 - 40 U/L    ALT 30 10 - 44 U/L    Anion Gap 13 8 - 16 mmol/L    eGFR if  African American >60 >60 mL/min/1.73 m^2    eGFR if non African American >60 >60 mL/min/1.73 m^2   Troponin I #1   Result Value Ref Range    Troponin I 0.007 0.000 - 0.026 ng/mL   B-Type natriuretic peptide (BNP)   Result Value Ref Range    BNP 2,066 (H) 0 - 99 pg/mL   COVID-19 Rapid Screening   Result Value Ref Range    SARS-CoV-2 RNA, Amplification, Qual Negative Negative   Lactic acid, plasma   Result Value Ref Range    Lactate (Lactic Acid) 3.9 (HH) 0.5 - 2.2 mmol/L   D dimer, quantitative   Result Value Ref Range    D-Dimer 0.70 (H) <0.50 mg/L FEU   Troponin I   Result Value Ref Range    Troponin I 0.029 (H) 0.000 - 0.026 ng/mL   Procalcitonin   Result Value Ref Range    Procalcitonin 0.07 <0.25 ng/mL   CBC auto differential   Result Value Ref Range    WBC 5.13 3.90 - 12.70 K/uL    RBC 4.79 4.00 - 5.40 M/uL    Hemoglobin 15.4 12.0 - 16.0 g/dL    Hematocrit 47.5 37.0 - 48.5 %    Mean Corpuscular Volume 99 (H) 82 - 98 fL    Mean Corpuscular Hemoglobin 32.2 (H) 27.0 - 31.0 pg    Mean Corpuscular Hemoglobin Conc 32.4 32.0 - 36.0 g/dL    RDW 12.8 11.5 - 14.5 %    Platelets 170 150 - 350 K/uL    MPV 10.8 9.2 - 12.9 fL    Immature Granulocytes 0.4 0.0 - 0.5 %    Gran # (ANC) 4.7 1.8 - 7.7 K/uL    Immature Grans (Abs) 0.02 0.00 - 0.04 K/uL    Lymph # 0.4 (L) 1.0 - 4.8 K/uL    Mono # 0.0 (L) 0.3 - 1.0 K/uL    Eos # 0.0 0.0 - 0.5 K/uL    Baso # 0.01 0.00 - 0.20 K/uL    nRBC 0 0 /100 WBC    Gran% 92.0 (H) 38.0 - 73.0 %    Lymph% 6.8 (L) 18.0 - 48.0 %    Mono% 0.6 (L) 4.0 - 15.0 %    Eosinophil% 0.0 0.0 - 8.0 %    Basophil% 0.2 0.0 - 1.9 %    Differential Method Automated    Basic metabolic panel   Result Value Ref Range    Sodium 141 136 - 145 mmol/L    Potassium 3.6 3.5 - 5.1 mmol/L    Chloride 101 95 - 110 mmol/L    CO2 30 (H) 23 - 29 mmol/L    Glucose 114 (H) 70 - 110 mg/dL    BUN, Bld 15 6 - 20 mg/dL    Creatinine 0.7 0.5 - 1.4 mg/dL    Calcium 8.7 8.7 - 10.5 mg/dL    Anion Gap 10 8 - 16 mmol/L    eGFR if African  American >60 >60 mL/min/1.73 m^2    eGFR if non African American >60 >60 mL/min/1.73 m^2   Magnesium   Result Value Ref Range    Magnesium 1.5 (L) 1.6 - 2.6 mg/dL   Phosphorus   Result Value Ref Range    Phosphorus 3.7 2.7 - 4.5 mg/dL   Lactic Acid, Plasma   Result Value Ref Range    Lactate (Lactic Acid) 1.2 0.5 - 2.2 mmol/L   Comprehensive metabolic panel   Result Value Ref Range    Sodium 137 136 - 145 mmol/L    Potassium 4.5 3.5 - 5.1 mmol/L    Chloride 100 95 - 110 mmol/L    CO2 27 23 - 29 mmol/L    Glucose 130 (H) 70 - 110 mg/dL    BUN, Bld 19 6 - 20 mg/dL    Creatinine 0.9 0.5 - 1.4 mg/dL    Calcium 9.3 8.7 - 10.5 mg/dL    Total Protein 7.3 6.0 - 8.4 g/dL    Albumin 3.9 3.5 - 5.2 g/dL    Total Bilirubin 0.8 0.1 - 1.0 mg/dL    Alkaline Phosphatase 96 55 - 135 U/L    AST 26 10 - 40 U/L    ALT 32 10 - 44 U/L    Anion Gap 10 8 - 16 mmol/L    eGFR if African American >60 >60 mL/min/1.73 m^2    eGFR if non African American >60 >60 mL/min/1.73 m^2   Magnesium   Result Value Ref Range    Magnesium 2.1 1.6 - 2.6 mg/dL   Troponin I   Result Value Ref Range    Troponin I 0.016 0.000 - 0.026 ng/mL   Echo Color Flow Doppler? Yes; Bubble Contrast? No   Result Value Ref Range    BSA 1.69 m2    TDI SEPTAL 0.06 m/s    LV LATERAL E/E' RATIO 13.43 m/s    LV SEPTAL E/E' RATIO 15.67 m/s    TDI LATERAL 0.07 m/s    LVIDD 5.29 3.5 - 6.0 cm    IVS 1.37 (A) 0.6 - 1.1 cm    PW 1.35 (A) 0.6 - 1.1 cm    Ao root annulus 3.42 cm    LVIDS 4.22 (A) 2.1 - 4.0 cm    FS 20 28 - 44 %    Sinus 3.68 cm    STJ 3.51 cm    Ascending aorta 3.28 cm    LV mass 305.02 g    LA size 4.12 cm    TAPSE 2.23 cm    Left Ventricle Relative Wall Thickness 0.51 cm    AV mean gradient 7 mmHg    AV valve area 2.37 cm2    AV Velocity Ratio 0.62     AV index (prosthetic) 0.68     E/A ratio 0.81     Mean e' 0.07 m/s    E wave decelartion time 307.98 msec    IVRT 97.05 msec    LVOT diameter 2.10 cm    LVOT area 3.5 cm2    LVOT peak anabelle 1.09 m/s    LVOT peak VTI 21.91 cm     Ao peak fermin 1.77 m/s    Ao VTI 32.00 cm    LVOT stroke volume 75.85 cm3    AV peak gradient 13 mmHg    E/E' ratio 14.46 m/s    MV Peak E Fermin 0.94 m/s    MV Peak A Fermin 1.16 m/s    LV Systolic Volume 79.37 mL    LV Systolic Volume Index 47.5 mL/m2    LV Diastolic Volume 134.48 mL    LV Diastolic Volume Index 80.44 mL/m2    LV Mass Index 182 g/m2    RA Major Axis 3.84 cm    Left Atrium Minor Axis 3.71 cm    Left Atrium Major Axis 6.38 cm    Right Atrial Pressure (from IVC) 3 mmHg   ISTAT PROCEDURE   Result Value Ref Range    POC PH 7.179 (LL) 7.35 - 7.45    POC PCO2 71.5 (HH) 35 - 45 mmHg    POC PO2 94 80 - 100 mmHg    POC HCO3 26.6 24 - 28 mmol/L    POC BE -2 -2 to 2 mmol/L    POC SATURATED O2 95 95 - 100 %    Rate 12     Sample ARTERIAL     Site RR     Allens Test Pass     DelSys CPAP/BiPAP     Mode BiPAP     FiO2 80     IP 12     EP 6    ISTAT PROCEDURE   Result Value Ref Range    POC PH 7.277 (LL) 7.35 - 7.45    POC PCO2 55.7 (H) 35 - 45 mmHg    POC PO2 83 80 - 100 mmHg    POC HCO3 26.0 24 - 28 mmol/L    POC BE -1 -2 to 2 mmol/L    POC SATURATED O2 94 (L) 95 - 100 %    Rate 25     Sample ARTERIAL     Site RR     Allens Test Pass     DelSys CPAP/BiPAP     Mode AVAPS     Vt 350     FiO2 65          Significant Imaging:   Imaging Results          X-Ray Chest AP Portable (Final result)  Result time 05/26/20 16:58:37    Final result by Cooper Oswald MD (05/26/20 16:58:37)                 Impression:      Cardiomegaly and mild perihilar vascular congestion and edema.      Electronically signed by: Cooper Oswald  Date:    05/26/2020  Time:    16:58             Narrative:    EXAMINATION:  XR CHEST AP PORTABLE    CLINICAL HISTORY:  Chest Pain;    TECHNIQUE:  Single frontal view of the chest was performed.    COMPARISON:  Chest radiograph 05/31/2013; CTA chest 05/31/2013    FINDINGS:  Cardiac leads project over the chest.  Cardiomediastinal silhouette is enlarged, similar to the prior.  Mild perihilar vascular  congestion with mild edema.  Hyperinflation of both lungs and prominence of the bronchovascular markings concerning for COPD.  Flattening of the diaphragms and chronic blunting of the bilateral costophrenic angles.  No pneumothorax.  Osseous structures appear intact.                                  Assessment/Plan:     * Acute on chronic combined systolic and diastolic heart failure, EF 45%  -Continue IV diuresis , transition to oral once near euvolemic   -Continue BB, ACEi  -Low salt diet   -Fluid restriction 1.5 Liter /day   -Smoking cessation       Hypertensive emergency  - BP on presentation was 190/130 with evidence of pulmonary edema , elevated troponin and respiratory distress   -Initially treated with NTG drip   -Currently transitioned to oral ACEi and BB   -Further adjustment of oral anti hypertensive with goal BP < 140/90  - Noted no concomitant Diabetes or Renal disease     COPD exacerbation  - Supplemental O2 to keep O2sat >90% given H/O COPD and hypercapnia   -Continue antibiotic Zithromax , oral steroid   -Inhalational bronchodilators     Elevated troponin  - Troponin trended down to normal 0.029> 0.007  -No H/O CAD  -MPI stress test in 10/19 - negative for ischemia   -Optimize medical treatment for CHF        PVC (premature ventricular contraction)  - Had EP study in the past   -Continue Amiodarone and BB      Tobacco abuse  - Strongly recommend to stop   -Pt is being adequately counseled         VTE Risk Mitigation (From admission, onward)         Ordered     enoxaparin injection 40 mg  Daily      05/26/20 1954                    Thank you for your consult. I will follow-up with patient. Please contact us if you have any additional questions.    Jeevan Marin MD  Department of Hospital Medicine   [facility]

## 2020-05-28 NOTE — PROGRESS NOTES
Home Oxygen Evaluation    Date Performed: 2020    1) Patient's Home O2 Sat on room air, while at rest: 96%        If O2 sats on room air at rest are 88% or below, patient qualifies. No additional testing needed. Document N/A in steps 2 and 3. If 89% or above, complete steps 2.      2) Patient's O2 Sat on room air while exercisin%        If O2 sats on room air while exercising remain 89% or above patient does not qualify, no further testing needed Document N/A in step 3. If O2 sats on room air while exercising are 88% or below, continue to step 3.      3) Patient's O2 Sat while exercising on O2: 92% at 2 LPM         (Must show improvement from #2 for patients to qualify)    If O2 sats improve on oxygen, patient qualifies for portable oxygen. If not, the patient does not qualify.

## 2020-05-28 NOTE — PLAN OF CARE
KRISTY met with patient at the bedside to discuss the discharge plan.  Patient understands that she will need oxygen prior to discharge.  She requested a small portable system.  KRISTY sent a message to Deepa with [facility] with patient request for a small portable system.  Patient denied the need for any other equipment and plans to return to work following discharge.     05/28/20 1011   Discharge Reassessment   Assessment Type Discharge Planning Reassessment   Provided patient/caregiver education on the expected discharge date and the discharge plan Yes   Do you have any problems affording any of your prescribed medications? No   Discharge Plan A Home   DME Needed Upon Discharge  oxygen   Patient choice form signed by patient/caregiver N/A   Anticipated Discharge Disposition Home   Can the patient/caregiver answer the patient profile reliably? Yes, cognitively intact

## 2020-05-28 NOTE — PROGRESS NOTES
Pharmacist Renal Dose Adjustment Note    Melissa Petty is a 55 y.o. female being treated with the medication Pepcid  Patient Data:    Vital Signs (Most Recent):  Temp: 96.4 °F (35.8 °C) (05/28/20 0729)  Pulse: 63 (05/28/20 1204)  Resp: 18 (05/28/20 1204)  BP: 117/60 (05/28/20 1204)  SpO2: (!) 93 % (05/28/20 1204)   Vital Signs (72h Range):  Temp:  [96.4 °F (35.8 °C)-98.2 °F (36.8 °C)]   Pulse:  []   Resp:  [6-42]   BP: ()/()   SpO2:  [79 %-100 %]      Recent Labs   Lab 05/27/20  0340 05/27/20  1310 05/28/20  0424   CREATININE 0.7 0.9 0.8     Serum creatinine: 0.8 mg/dL 05/28/20 0424  Estimated creatinine clearance: 69.9 mL/min    Medication:Pepcid dose: 20 mg frequency daily will be changed to medication:Pepcid dose:20 mg frequency:BID    Pharmacist's Name: Albania Salazar  Pharmacist's Extension: [phone]

## 2020-05-28 NOTE — ASSESSMENT & PLAN NOTE
-Troponin 0.007>0.029>0.016  -Elevation secondary to demand ischemia from COPD exacerbation, HTN emergency, CHF  -No complaints of chest pain  -Continue ASA, BB, ACEi  -Check echo  -MPI stress test 10/19 negative for ischemia

## 2020-05-28 NOTE — PLAN OF CARE
[facility] for home oxygen     05/28/20 1146   Post-Acute Status   Post-Acute Authorization HME   E Status Set-up Complete

## 2020-05-29 ENCOUNTER — TELEPHONE (OUTPATIENT)
Dept: FAMILY MEDICINE | Facility: CLINIC | Age: 55
End: 2020-05-29

## 2020-05-29 ENCOUNTER — PATIENT OUTREACH (OUTPATIENT)
Dept: ADMINISTRATIVE | Facility: CLINIC | Age: 55
End: 2020-05-29

## 2020-05-29 ENCOUNTER — TELEPHONE (OUTPATIENT)
Dept: PULMONOLOGY | Facility: CLINIC | Age: 55
End: 2020-05-29

## 2020-05-29 DIAGNOSIS — I50.9 CHF (NYHA CLASS III, ACC/AHA STAGE C): Primary | Chronic | ICD-10-CM

## 2020-05-29 DIAGNOSIS — I50.22 CHRONIC SYSTOLIC HEART FAILURE: Primary | ICD-10-CM

## 2020-05-29 RX ORDER — POTASSIUM CHLORIDE 750 MG/1
10 TABLET, EXTENDED RELEASE ORAL 2 TIMES DAILY
COMMUNITY
End: 2020-05-29

## 2020-05-29 RX ORDER — POTASSIUM CHLORIDE 750 MG/1
20 TABLET, EXTENDED RELEASE ORAL ONCE
Qty: 30 TABLET | Refills: 3 | Status: CANCELLED | OUTPATIENT
Start: 2020-05-29 | End: 2020-05-29

## 2020-05-29 RX ORDER — POTASSIUM CHLORIDE 750 MG/1
10 TABLET, EXTENDED RELEASE ORAL DAILY
Qty: 30 TABLET | Refills: 11 | Status: SHIPPED | OUTPATIENT
Start: 2020-05-29 | End: 2020-06-03

## 2020-05-29 RX ORDER — POTASSIUM CHLORIDE 750 MG/1
10 TABLET, EXTENDED RELEASE ORAL DAILY
COMMUNITY
End: 2020-05-29 | Stop reason: SDUPTHER

## 2020-05-29 NOTE — TELEPHONE ENCOUNTER
LEONARDO Suarez, RN   Caller: Unspecified (Today, 11:33 AM)             10 meq daily, send me request     Thanks

## 2020-05-29 NOTE — TELEPHONE ENCOUNTER
Pt returning call.  Informed pt to take potassium 10meQ by mouth daily and sent to [facility] on [address] in [place].  Pt verbalized understanding.

## 2020-05-29 NOTE — TELEPHONE ENCOUNTER
----- Message from Nydia Nuñez sent at 5/29/2020  2:26 PM CDT -----  Contact: Pt  Pt is returning the staff call. Pt is waiting  Pt call back [phone]     Thanks

## 2020-05-29 NOTE — PATIENT INSTRUCTIONS

## 2020-05-29 NOTE — TELEPHONE ENCOUNTER
----- Message from Ifeoma Snyder RN sent at 5/29/2020 11:33 AM CDT -----  HI,  I am doing a TCC/post discharge follow up with above pt. She is on Lasix 40 mg daily. Her Potassium states take prn with Lasix. Please advise pt if she needs to start taking the Potassium daily with Lasix. If she needs to take Potassium daily please call in Rx to [facility] on [address] in [place] as she does not have this med at home.     Thanks,  Ifeoma Snyder RN

## 2020-05-29 NOTE — TELEPHONE ENCOUNTER
"VIVIEN Manning Staff   Cc: MAKAYLA Weems Staff   Caller: Unspecified (Today, 11:46 AM)             Update: The Lasix is 20 mg daily, not 40 mg     Thanks,   Ifeoma Snyder RN          I am "done-ing" these messages to clean up the box.  :D  "

## 2020-05-29 NOTE — TELEPHONE ENCOUNTER
I left a voicemail for Ms Torres to call the office to go over her medications    She can take 10 meQ of potassium daily since she is taking Lasix daily

## 2020-05-29 NOTE — TELEPHONE ENCOUNTER
----- Message from Faustina Barajas PA-C sent at 5/29/2020  1:08 PM CDT -----  10 meq daily, send me request    Thanks  ----- Message -----  From: Roxana Pantoja RN  Sent: 5/29/2020  12:06 PM CDT  To: Faustina Barajas PA-C    Please advise. And the lasix is 20mg not 40mg.  ----- Message -----  From: Ifeoma Snyder RN  Sent: 5/29/2020  11:33 AM CDT  To: Yuri Belcher Staff, Karl Weems Staff    HI,  I am doing a TCC/post discharge follow up with above pt. She is on Lasix 40 mg daily. Her Potassium states take prn with Lasix. Please advise pt if she needs to start taking the Potassium daily with Lasix. If she needs to take Potassium daily please call in Rx to [facility] [address] in [place] as she does not have this med at home.     Thanks,  Ifeoma Snyder RN

## 2020-05-29 NOTE — PLAN OF CARE
Jun3 Congestive Heart Failure Follow Up with Ye Welch MD   Wednesday Andre 3, 2020 2:00 PM   Arrive at check-in approximately 15 minutes before your scheduled appointment time. Bring all outside medical records and imaging, along with a list of your current medications and insurance card.  (off [facility]) 2nd floor  [facility]   [address] [facility] [address]   [phone]     Oxygen through [facility]     05/29/20 0717   Final Note   Assessment Type Final Discharge Note   Anticipated Discharge Disposition Home   Hospital Follow Up  Appt(s) scheduled? Yes   Right [facility] Referral Info   Post Acute Recommendation No Care

## 2020-05-29 NOTE — TELEPHONE ENCOUNTER
----- Message from Ifeoma Snyder RN sent at 5/29/2020  1:39 PM CDT -----  Hi,  I am not in the hospital. Your office will need to contact pt with update on med.  Thanks so much for your timely response.   Thanks,  Ifeoma Snyder RN    ----- Message -----  From: Sara Lira MA  Sent: 5/29/2020   1:34 PM CDT  To: Ifeoma Snyder RN        ----- Message -----  From: Faustina Barajas PA-C  Sent: 5/29/2020   1:07 PM CDT  To: Sara Lira MA    She can take 10 meQ of potassium daily since she is taking Lasix daily    Please send me a prescription request  ----- Message -----  From: Sara Lira MA  Sent: 5/29/2020  11:43 AM CDT  To: Faustina Barajas PA-C        ----- Message -----  From: Ifeoma Snyder RN  Sent: 5/29/2020  11:33 AM CDT  To: Yuri Belcher Staff, Karl Weems Staff    HI,  I am doing a TCC/post discharge follow up with above pt. She is on Lasix 40 mg daily. Her Potassium states take prn with Lasix. Please advise pt if she needs to start taking the Potassium daily with Lasix. If she needs to take Potassium daily please call in Rx to [facility] [address] in [place] as she does not have this med at home.     Thanks,  Ifeoma Snyder RN

## 2020-05-29 NOTE — TELEPHONE ENCOUNTER
----- Message from Dona Brothers sent at 5/29/2020 11:52 AM CDT -----  Contact: pt   Stated she calling to schedule a hospital follow up visit, she can be reached at [phone]

## 2020-05-31 NOTE — HOSPITAL COURSE
· 5/28- Pt feels better overall . Responded well to IV  diuretics and became near euvolemic. SOB improved. Lasix transitioned to oral . Echo resulted mildly decreased left ventricular systolic function. The estimated ejection fraction is 45% and Grade I (mild) left ventricular diastolic dysfunction consistent with impaired relaxation. Pt was evaluated for Home O2. Noted SpO2 96% on RA at rest but dropped to 84% with exercise suggestive of exercise hypoxia , improved to 92% with O2 at 2L/min. Pt is  therefore qualified for Home O2. At this point pt is deemed medically stable to be discharged home with homeO2. Pt will follow up with Cardiologist andwill be refer to Pulmonology for new pt visit for COPD and chronic respiratory failure with hypoxia and hypercapnia .

## 2020-05-31 NOTE — DISCHARGE SUMMARY
[facility] Hospital Medicine  Discharge Summary      Patient Name: Melissa Petty  MRN: [id number]  Admission Date: 5/26/2020  Hospital Length of Stay: 2 days  Discharge Date and Time: 5/28/2020  2:58 PM  Attending Physician: Rissa att. 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 Non ischemic Cardiomyopathy, COPD, Current everyday smoker, Frequent PVC's , was admitted on 5/26/2020 in ICU for evaluation of increased SOB on exertion and laying flat  and lower ext edema . On initial presentation she was noted to be in  respiratory distress with hypercapnia required NIPPV via AVAPS , markedly elevated BP with evidence of pulmonary edema suggestive of hypertensive emergency required aggressive IV diuretic treatment and BP control with NTG drip. She also received treatment for COPD exacerbation with antibiotic , systemic steroid and inhalational bronchodilators . Pt admits to not being complaint with low salt diet and occasional skipping of home medications. On admit BNP was 2066 , initial troponin was 0.029. CXR resulted pulmonary edema and vascular congestion . Since admission , pt has responded to prescribed treatment with excellent diuresis , oxygen weaned down to NC at 3L/min and improved BP control with oral antihypertensives and weaned of NTG drip. Care stepped down to telemetry and Hospital Medicine is consulted to assume care . Today pt reports feeling better subjectively with improved SOB and lower ext edema . Denies chest pain , nausea , vomiting or diaphoresis.     * No surgery found *      Hospital Course:   5/28- Pt feels better overall . Responded well to IV  diuretics and became near euvolemic. SOB improved. Lasix transitioned to oral . Echo resulted mildly decreased left ventricular systolic function. The  estimated ejection fraction is 45% and Grade I (mild) left ventricular diastolic dysfunction consistent with impaired relaxation. Pt was evaluated for Home O2. Noted SpO2 96% on RA at rest but dropped to 84% with exercise suggestive of exercise hypoxia , improved to 92% with O2 at 2L/min. Pt is  therefore qualified for Home O2. At this point pt is deemed medically stable to be discharged home with homeO2. Pt will follow up with Cardiologist andwill be refer to Pulmonology for new pt visit for COPD and chronic respiratory failure with hypoxia and hypercapnia .      Consults:   Consults (From admission, onward)        Status Ordering Provider     Inpatient consult to Cardiology  Once     Provider:  Markie Koenig MD    Completed AFTAB WILKINS          No new Assessment & Plan notes have been filed under this hospital service since the last note was generated.  Service: Hospital Medicine    Final Active Diagnoses:    Diagnosis Date Noted POA    COPD exacerbation [J44.1] 05/26/2020 Yes    Elevated troponin [R79.89] 05/27/2020 No    PVC (premature ventricular contraction) [I49.3] 10/17/2019 Yes    Tobacco abuse [Z72.0] 06/24/2013 Yes      Problems Resolved During this Admission:    Diagnosis Date Noted Date Resolved POA    PRINCIPAL PROBLEM:  Acute on chronic combined systolic and diastolic heart failure, EF 45% [I50.43] 05/27/2020 05/28/2020 Yes    Hypertensive emergency [I16.1] 05/26/2020 05/28/2020 Yes    Acute exacerbation of CHF (congestive heart failure) [I50.9] 05/26/2020 05/27/2020 Yes       Discharged Condition: stable    Disposition: Home or Self Care    Follow Up:  Follow-up Information     [facility]    Specialty:  DME Provider  Why:  DME- home oxygen  Contact information:  [address]  [facility] [address]  [phone]             Faustina Barajas PA-C. Schedule an appointment as soon as possible for a visit in 1 week.    Specialty:  Cardiology  Why:  Discharge follow up on  "Heart failure   Contact information:  [address] [place] [address]  [phone]             Sung Espinal MD. Schedule an appointment as soon as possible for a visit in 1 week.    Specialty:  Pulmonary Disease  Why:  Refer to Dr. Espinal for new pt visit for COPD and Chronic respiratory failure   Contact information:  [address] [facility]  [place] [address]  [phone]                 Patient Instructions:      OXYGEN FOR HOME USE     Order Specific Question Answer Comments   Liter Flow 2    Duration Continuous    Qualifying SpO2: 84%    Testing done at: Exercise/Activity    Route nasal cannula    Portable mode: pulse dose acceptable    Device home concentrator with portable unit    Length of need (in months): 99 mos    Patient condition with qualifying saturation COPD    Height: 5' 3" (1.6 m)    Weight: 64.1 kg (141 lb 5 oz)    Does patient have medical equipment at home? none    Alternative treatment measures have been tried or considered and deemed clinically ineffective. Yes    Vendor: [facility]    Expected Date of Delivery: 5/28/2020      Diet Cardiac   Order Comments: Salt restriction 2 gram per day   Fluid restriction 1.5 Liter /day     Activity as tolerated       Significant Diagnostic Studies: Labs: BMP: No results for input(s): GLU, NA, K, CL, CO2, BUN, CREATININE, CALCIUM, MG in the last 48 hours., CMP No results for input(s): NA, K, CL, CO2, GLU, BUN, CREATININE, CALCIUM, PROT, ALBUMIN, BILITOT, ALKPHOS, AST, ALT, ANIONGAP, ESTGFRAFRICA, EGFRNONAA in the last 48 hours. and CBC No results for input(s): WBC, HGB, HCT, PLT in the last 48 hours.    Pending Diagnostic Studies:     Procedure Component Value Units Date/Time    Troponin I #2 [[id number]] Collected:  05/26/20 1648    Order Status:  Sent Lab Status:  In process Updated:  05/26/20 1649    Specimen:  Blood          Medications:  Reconciled Home Medications:      Medication List      START taking these medications  "   azithromycin 250 MG tablet  Commonly known as:  Z-VITA  Take 1 tablet (250 mg total) by mouth once daily. for 4 days     fluticasone propion-salmeterol 115-21 mcg/dose 115-21 mcg/actuation Hfaa inhaler  Commonly known as:  ADVAIR HFA  Inhale 2 puffs into the lungs every 12 (twelve) hours. Controller     predniSONE 20 MG tablet  Commonly known as:  DELTASONE  Take 1 tablet (20 mg total) by mouth once daily. for 5 days        CHANGE how you take these medications    furosemide 20 MG tablet  Commonly known as:  LASIX  Take 1 tablet (20 mg total) by mouth once daily.  What changed:  additional instructions     lisinopriL 10 MG tablet  Take 1 tablet (10 mg total) by mouth once daily.  What changed:  when to take this        CONTINUE taking these medications    amiodarone 200 MG Tab  Commonly known as:  PACERONE  Take 1 tablet (200 mg total) by mouth once daily.     aspirin 81 MG EC tablet  Commonly known as:  ECOTRIN  Take 81 mg by mouth.     carvediloL 25 MG tablet  Commonly known as:  COREG  Take 1 tablet (25 mg total) by mouth 2 (two) times daily with meals.     folic acid 800 MCG Tab  Commonly known as:  FOLVITE  Take 400 mcg by mouth once daily.     VITAMIN B-1 100 MG tablet  Generic drug:  thiamine  Take 250 mg by mouth once daily.        STOP taking these medications    potassium chloride SA 20 MEQ tablet  Commonly known as:  K-DUR,KLOR-CON            Indwelling Lines/Drains at time of discharge:   Lines/Drains/Airways     None                 Time spent on the discharge of patient:  40  minutes  Patient was seen and examined on the date of discharge and determined to be suitable for discharge.         Jeevan Marin MD  Department of Hospital Medicine  [facility]

## 2020-06-01 LAB
BACTERIA BLD CULT: NORMAL
BACTERIA BLD CULT: NORMAL

## 2020-06-02 ENCOUNTER — LAB VISIT (OUTPATIENT)
Dept: LAB | Facility: HOSPITAL | Age: 55
End: 2020-06-02
Attending: NUCLEAR MEDICINE
Payer: COMMERCIAL

## 2020-06-02 DIAGNOSIS — I50.22 CHRONIC SYSTOLIC HEART FAILURE: Primary | ICD-10-CM

## 2020-06-02 DIAGNOSIS — I50.22 CHRONIC SYSTOLIC HEART FAILURE: ICD-10-CM

## 2020-06-02 LAB
ANION GAP SERPL CALC-SCNC: 10 MMOL/L (ref 8–16)
BNP SERPL-MCNC: 150 PG/ML (ref 0–99)
BUN SERPL-MCNC: 27 MG/DL (ref 6–20)
CALCIUM SERPL-MCNC: 9.9 MG/DL (ref 8.7–10.5)
CHLORIDE SERPL-SCNC: 101 MMOL/L (ref 95–110)
CO2 SERPL-SCNC: 27 MMOL/L (ref 23–29)
CREAT SERPL-MCNC: 1.1 MG/DL (ref 0.5–1.4)
EST. GFR  (AFRICAN AMERICAN): >60 ML/MIN/1.73 M^2
EST. GFR  (NON AFRICAN AMERICAN): 56 ML/MIN/1.73 M^2
GLUCOSE SERPL-MCNC: 77 MG/DL (ref 70–110)
POTASSIUM SERPL-SCNC: 4.9 MMOL/L (ref 3.5–5.1)
SODIUM SERPL-SCNC: 138 MMOL/L (ref 136–145)

## 2020-06-02 PROCEDURE — 36415 COLL VENOUS BLD VENIPUNCTURE: CPT

## 2020-06-02 PROCEDURE — 80048 BASIC METABOLIC PNL TOTAL CA: CPT

## 2020-06-02 PROCEDURE — 83880 ASSAY OF NATRIURETIC PEPTIDE: CPT

## 2020-06-03 ENCOUNTER — OFFICE VISIT (OUTPATIENT)
Dept: CARDIOLOGY | Facility: CLINIC | Age: 55
End: 2020-06-03
Payer: COMMERCIAL

## 2020-06-03 ENCOUNTER — HOSPITAL ENCOUNTER (OUTPATIENT)
Dept: CARDIOLOGY | Facility: HOSPITAL | Age: 55
Discharge: HOME | End: 2020-06-03
Attending: NUCLEAR MEDICINE
Payer: COMMERCIAL

## 2020-06-03 VITALS
DIASTOLIC BLOOD PRESSURE: 82 MMHG | WEIGHT: 136.69 LBS | SYSTOLIC BLOOD PRESSURE: 130 MMHG | HEIGHT: 63 IN | HEART RATE: 61 BPM | BODY MASS INDEX: 24.22 KG/M2 | OXYGEN SATURATION: 97 %

## 2020-06-03 DIAGNOSIS — I50.9 CHF (NYHA CLASS III, ACC/AHA STAGE C): Primary | Chronic | ICD-10-CM

## 2020-06-03 DIAGNOSIS — I42.0 CARDIOMYOPATHY, DILATED, NONISCHEMIC: ICD-10-CM

## 2020-06-03 DIAGNOSIS — I10 ESSENTIAL HYPERTENSION: ICD-10-CM

## 2020-06-03 DIAGNOSIS — I50.22 CHRONIC SYSTOLIC HEART FAILURE: ICD-10-CM

## 2020-06-03 PROCEDURE — 99214 PR OFFICE/OUTPT VISIT, EST, LEVL IV, 30-39 MIN: ICD-10-PCS | Mod: S$GLB,,, | Performed by: NUCLEAR MEDICINE

## 2020-06-03 PROCEDURE — 99999 PR PBB SHADOW E&M-EST. PATIENT-LVL III: ICD-10-PCS | Mod: PBBFAC,,, | Performed by: NUCLEAR MEDICINE

## 2020-06-03 PROCEDURE — 3075F SYST BP GE 130 - 139MM HG: CPT | Mod: CPTII,S$GLB,, | Performed by: NUCLEAR MEDICINE

## 2020-06-03 PROCEDURE — 93010 ELECTROCARDIOGRAM REPORT: CPT | Mod: ,,, | Performed by: INTERNAL MEDICINE

## 2020-06-03 PROCEDURE — 93010 EKG 12-LEAD: ICD-10-PCS | Mod: ,,, | Performed by: INTERNAL MEDICINE

## 2020-06-03 PROCEDURE — 3008F BODY MASS INDEX DOCD: CPT | Mod: CPTII,S$GLB,, | Performed by: NUCLEAR MEDICINE

## 2020-06-03 PROCEDURE — 3008F PR BODY MASS INDEX (BMI) DOCUMENTED: ICD-10-PCS | Mod: CPTII,S$GLB,, | Performed by: NUCLEAR MEDICINE

## 2020-06-03 PROCEDURE — 99999 PR PBB SHADOW E&M-EST. PATIENT-LVL III: CPT | Mod: PBBFAC,,, | Performed by: NUCLEAR MEDICINE

## 2020-06-03 PROCEDURE — 3075F PR MOST RECENT SYSTOLIC BLOOD PRESS GE 130-139MM HG: ICD-10-PCS | Mod: CPTII,S$GLB,, | Performed by: NUCLEAR MEDICINE

## 2020-06-03 PROCEDURE — 3079F PR MOST RECENT DIASTOLIC BLOOD PRESSURE 80-89 MM HG: ICD-10-PCS | Mod: CPTII,S$GLB,, | Performed by: NUCLEAR MEDICINE

## 2020-06-03 PROCEDURE — 93005 ELECTROCARDIOGRAM TRACING: CPT

## 2020-06-03 PROCEDURE — 99214 OFFICE O/P EST MOD 30 MIN: CPT | Mod: S$GLB,,, | Performed by: NUCLEAR MEDICINE

## 2020-06-03 PROCEDURE — 3079F DIAST BP 80-89 MM HG: CPT | Mod: CPTII,S$GLB,, | Performed by: NUCLEAR MEDICINE

## 2020-06-03 RX ORDER — POTASSIUM CHLORIDE 750 MG/1
10 TABLET, EXTENDED RELEASE ORAL EVERY OTHER DAY
Qty: 30 TABLET | Refills: 11
Start: 2020-06-03 | End: 2020-07-02

## 2020-06-03 RX ORDER — FUROSEMIDE 20 MG/1
20 TABLET ORAL EVERY OTHER DAY
Qty: 15 TABLET | Refills: 0
Start: 2020-06-03 | End: 2020-07-02

## 2020-06-03 NOTE — PROGRESS NOTES
Subjective:   Patient ID:  Melissa Petty is a 55 y.o. female who presents for follow-up of Congestive Heart Failure (HFrEF, C) and Hypertension (ESSENTIAL)      HPI FEELING BETTER. PAINTER SIGNIFICANTLY IMPROVED, NO ORTHOPNEA OR PND  NO PALPITATIONS. NO NEAR SYNCOPE OR SYNCOPE  NO EDEMA. NO CALVE TENDERNESS  NO CHEST DISCOMFORT- ANGINA OR PLEURITIC  NO ABDOMINAL DISCOMFORT  RECENT BMP AND BNP IMPROVED  CARD MED -GOOD COMPLIANCE, WELL TOLERATED  ECG- SR, 61, LVH WITH SECONDARY ST T CHANGES    Review of Systems   Constitution: Positive for malaise/fatigue. Negative for chills, fever, night sweats, weight gain and weight loss.   HENT: Negative for nosebleeds.    Eyes: Negative for blurred vision, double vision and visual disturbance.   Cardiovascular: Negative for chest pain, dyspnea on exertion, irregular heartbeat, leg swelling, orthopnea, palpitations, paroxysmal nocturnal dyspnea and syncope.   Respiratory: Negative for cough, hemoptysis and wheezing.    Endocrine: Negative for polydipsia and polyuria.   Hematologic/Lymphatic: Does not bruise/bleed easily.   Skin: Negative for rash.   Musculoskeletal: Negative for joint pain, joint swelling, muscle weakness and myalgias.   Gastrointestinal: Negative for abdominal pain, hematemesis, jaundice and melena.   Genitourinary: Negative for dysuria, hematuria and nocturia.   Neurological: Negative for dizziness, focal weakness, headaches, sensory change and weakness.   Psychiatric/Behavioral: Negative for depression. The patient does not have insomnia and is not nervous/anxious.      Family History   Problem Relation Age of Onset    Heart failure Mother     Hypertension Brother      Past Medical History:   Diagnosis Date    Cardiomyopathy     CHF (congestive heart failure)     COPD exacerbation 5/26/2020    Hypertension      Social History     Socioeconomic History    Marital status: Single     Spouse name: Not on file    Number of children: Not on file    Years of  education: Not on file    Highest education level: Not on file   Occupational History    Not on file   Social Needs    Financial resource strain: Not on file    Food insecurity:     Worry: Not on file     Inability: Not on file    Transportation needs:     Medical: Not on file     Non-medical: Not on file   Tobacco Use    Smoking status: Current Every Day Smoker     Packs/day: 1.00     Years: 32.00     Pack years: 32.00    Smokeless tobacco: Never Used   Substance and Sexual Activity    Alcohol use: Yes     Comment: Occassionally    Drug use: No    Sexual activity: Not on file   Lifestyle    Physical activity:     Days per week: Not on file     Minutes per session: Not on file    Stress: Not on file   Relationships    Social connections:     Talks on phone: Not on file     Gets together: Not on file     Attends Anabaptist service: Not on file     Active member of club or organization: Not on file     Attends meetings of clubs or organizations: Not on file     Relationship status: Not on file   Other Topics Concern    Not on file   Social History Narrative    Not on file     Current Outpatient Medications on File Prior to Visit   Medication Sig Dispense Refill    amiodarone (PACERONE) 200 MG Tab Take 1 tablet (200 mg total) by mouth once daily. 30 tablet 11    aspirin (ECOTRIN) 81 MG EC tablet Take 81 mg by mouth.      azithromycin (Z-VITA) 250 MG tablet Take 1 tablet (250 mg total) by mouth once daily. for 4 days 4 tablet 0    carvedilol (COREG) 25 MG tablet Take 1 tablet (25 mg total) by mouth 2 (two) times daily with meals. 180 tablet 3    fluticasone propion-salmeterol 115-21 mcg/dose (ADVAIR HFA) 115-21 mcg/actuation HFAA inhaler Inhale 2 puffs into the lungs every 12 (twelve) hours. Controller 12 g 0    folic acid (FOLVITE) 800 MCG Tab Take 400 mcg by mouth once daily.      furosemide (LASIX) 20 MG tablet Take 1 tablet (20 mg total) by mouth once daily. 30 tablet 0    lisinopriL 10 MG  tablet Take 1 tablet (10 mg total) by mouth once daily. 30 tablet 0    potassium chloride SA (K-DUR,KLOR-CON) 10 MEQ tablet Take 1 tablet (10 mEq total) by mouth once daily. 30 tablet 11    predniSONE (DELTASONE) 20 MG tablet Take 1 tablet (20 mg total) by mouth once daily. for 5 days 5 tablet 0    thiamine (VITAMIN B-1) 100 MG tablet Take 250 mg by mouth once daily.       No current facility-administered medications on file prior to visit.      Review of patient's allergies indicates:   Allergen Reactions    Pcn [penicillins]      Unknown reaction         Objective:     Physical Exam   Constitutional: She is oriented to person, place, and time. She appears well-developed. No distress.   HENT:   Head: Normocephalic.   Eyes: Pupils are equal, round, and reactive to light. Conjunctivae are normal. No scleral icterus.   Neck: Normal range of motion. Neck supple. Normal carotid pulses, no hepatojugular reflux and no JVD present. Carotid bruit is not present. No edema present. No thyroid mass and no thyromegaly present.   Cardiovascular: Normal rate, regular rhythm, S1 normal, S2 normal, normal heart sounds and intact distal pulses. PMI is not displaced. Exam reveals no gallop and no friction rub.   No murmur heard.  Pulses:       Carotid pulses are 2+ on the right side, and 2+ on the left side.       Radial pulses are 2+ on the right side, and 2+ on the left side.        Femoral pulses are 2+ on the right side, and 2+ on the left side.       Popliteal pulses are 2+ on the right side, and 2+ on the left side.        Dorsalis pedis pulses are 2+ on the right side, and 2+ on the left side.        Posterior tibial pulses are 2+ on the right side, and 2+ on the left side.   Pulmonary/Chest: Effort normal and breath sounds normal. She has no wheezes. She has no rales. She exhibits no tenderness.   Abdominal: Soft. Bowel sounds are normal. She exhibits no pulsatile midline mass and no mass. There is no hepatosplenomegaly.  There is no tenderness.   Musculoskeletal: Normal range of motion. She exhibits no edema or tenderness.        Cervical back: Normal.        Thoracic back: Normal.        Lumbar back: Normal.   Lymphadenopathy:     She has no cervical adenopathy.     She has no axillary adenopathy.        Right: No supraclavicular adenopathy present.        Left: No supraclavicular adenopathy present.   Neurological: She is alert and oriented to person, place, and time. She has normal strength and normal reflexes. No sensory deficit. Gait normal.   Skin: Skin is warm. No rash noted. No cyanosis. No pallor. Nails show no clubbing.   Psychiatric: She has a normal mood and affect. Her speech is normal and behavior is normal. Cognition and memory are normal.       Assessment:     1. CHF (NYHA class III, ACC/AHA stage C)    2. Cardiomyopathy, dilated, nonischemic    3. Essential hypertension        Plan:     CHF (NYHA class III, ACC/AHA stage C)  CLINICALLY WELL COMPENSATED    Cardiomyopathy, dilated, nonischemic  NO EVIDENCE OF CARDIO EMBOLISM  NO ARRHYTHMIAS    Essential hypertension  BP WELL CONTROLLED  CNS STATUS STABLE    CONTINUE PRESENT CARD MANAGEMENT    RETURN IN  4 WEEKS.

## 2020-06-09 ENCOUNTER — OFFICE VISIT (OUTPATIENT)
Dept: PULMONOLOGY | Facility: CLINIC | Age: 55
End: 2020-06-09
Payer: COMMERCIAL

## 2020-06-09 VITALS
BODY MASS INDEX: 24.53 KG/M2 | DIASTOLIC BLOOD PRESSURE: 64 MMHG | SYSTOLIC BLOOD PRESSURE: 116 MMHG | OXYGEN SATURATION: 95 % | WEIGHT: 138.44 LBS | HEART RATE: 53 BPM | HEIGHT: 63 IN | RESPIRATION RATE: 16 BRPM

## 2020-06-09 DIAGNOSIS — Z72.0 TOBACCO ABUSE: ICD-10-CM

## 2020-06-09 DIAGNOSIS — I10 ESSENTIAL HYPERTENSION: ICD-10-CM

## 2020-06-09 DIAGNOSIS — J44.9 COPD, SEVERITY TO BE DETERMINED: Primary | ICD-10-CM

## 2020-06-09 DIAGNOSIS — I42.0 CARDIOMYOPATHY, DILATED, NONISCHEMIC: ICD-10-CM

## 2020-06-09 DIAGNOSIS — I50.22 CHRONIC SYSTOLIC HEART FAILURE: ICD-10-CM

## 2020-06-09 DIAGNOSIS — Z12.9 SCREENING FOR CANCER: ICD-10-CM

## 2020-06-09 DIAGNOSIS — I50.9 CHF (NYHA CLASS III, ACC/AHA STAGE C): Chronic | ICD-10-CM

## 2020-06-09 PROCEDURE — 99214 OFFICE O/P EST MOD 30 MIN: CPT | Mod: 25,S$GLB,, | Performed by: INTERNAL MEDICINE

## 2020-06-09 PROCEDURE — 3008F PR BODY MASS INDEX (BMI) DOCUMENTED: ICD-10-PCS | Mod: CPTII,S$GLB,, | Performed by: INTERNAL MEDICINE

## 2020-06-09 PROCEDURE — 3008F BODY MASS INDEX DOCD: CPT | Mod: CPTII,S$GLB,, | Performed by: INTERNAL MEDICINE

## 2020-06-09 PROCEDURE — 99214 PR OFFICE/OUTPT VISIT, EST, LEVL IV, 30-39 MIN: ICD-10-PCS | Mod: 25,S$GLB,, | Performed by: INTERNAL MEDICINE

## 2020-06-09 PROCEDURE — 99999 PR PBB SHADOW E&M-EST. PATIENT-LVL IV: CPT | Mod: PBBFAC,,, | Performed by: INTERNAL MEDICINE

## 2020-06-09 PROCEDURE — 99999 PR PBB SHADOW E&M-EST. PATIENT-LVL IV: ICD-10-PCS | Mod: PBBFAC,,, | Performed by: INTERNAL MEDICINE

## 2020-06-09 PROCEDURE — 3074F SYST BP LT 130 MM HG: CPT | Mod: CPTII,S$GLB,, | Performed by: INTERNAL MEDICINE

## 2020-06-09 PROCEDURE — 3078F DIAST BP <80 MM HG: CPT | Mod: CPTII,S$GLB,, | Performed by: INTERNAL MEDICINE

## 2020-06-09 PROCEDURE — 3074F PR MOST RECENT SYSTOLIC BLOOD PRESSURE < 130 MM HG: ICD-10-PCS | Mod: CPTII,S$GLB,, | Performed by: INTERNAL MEDICINE

## 2020-06-09 PROCEDURE — 3078F PR MOST RECENT DIASTOLIC BLOOD PRESSURE < 80 MM HG: ICD-10-PCS | Mod: CPTII,S$GLB,, | Performed by: INTERNAL MEDICINE

## 2020-06-09 RX ORDER — ALBUTEROL SULFATE 90 UG/1
1-2 AEROSOL, METERED RESPIRATORY (INHALATION) EVERY 6 HOURS PRN
Qty: 1 G | Refills: 3 | Status: SHIPPED | OUTPATIENT
Start: 2020-06-09 | End: 2021-06-24

## 2020-06-09 NOTE — PROGRESS NOTES
Pulmonary Outpatient Follow Up Visit     Subjective:       Patient ID: Melissa Petty is a 55 y.o. female.    [facility] Medicine  Discharge Summary        Patient Name: Meilssa Petty  MRN: [id number]  Admission Date: 5/26/2020  Hospital Length of Stay: 2 days  Discharge Date and Time: 5/28/2020  2:58 PM  Attending Physician: Rissa att. 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 Non ischemic Cardiomyopathy, COPD, Current everyday smoker, Frequent PVC's , was admitted on 5/26/2020 in ICU for evaluation of increased SOB on exertion and laying flat  and lower ext edema . On initial presentation she was noted to be in  respiratory distress with hypercapnia required NIPPV via AVAPS , markedly elevated BP with evidence of pulmonary edema suggestive of hypertensive emergency required aggressive IV diuretic treatment and BP control with NTG drip. She also received treatment for COPD exacerbation with antibiotic , systemic steroid and inhalational bronchodilators . Pt admits to not being complaint with low salt diet and occasional skipping of home medications. On admit BNP was 2066 , initial troponin was 0.029. CXR resulted pulmonary edema and vascular congestion . Since admission , pt has responded to prescribed treatment with excellent diuresis , oxygen weaned down to NC at 3L/min and improved BP control with oral antihypertensives and weaned of NTG drip. Care stepped down to telemetry and Hospital Medicine is consulted to assume care . Today pt reports feeling better subjectively with improved SOB and lower ext edema . Denies chest pain , nausea , vomiting or diaphoresis.      * No surgery found *       Hospital Course:   5/28- Pt feels better overall . Responded well to IV   diuretics and became near euvolemic. SOB improved. Lasix transitioned to oral . Echo resulted mildly decreased left ventricular systolic function. The estimated ejection fraction is 45% and Grade I (mild) left ventricular diastolic dysfunction consistent with impaired relaxation. Pt was evaluated for Home O2. Noted SpO2 96% on RA at rest but dropped to 84% with exercise suggestive of exercise hypoxia , improved to 92% with O2 at 2L/min. Pt is  therefore qualified for Home O2. At this point pt is deemed medically stable to be discharged home with homeO2. Pt will follow up with Cardiologist andwill be refer to Pulmonology for new pt visit for COPD and chronic respiratory failure with hypoxia and hypercapnia .         Chief Complaint: Hospital Follow Up and COPD      HPI  Melissa Petty is 55 y.o.  Recent discharge for [facility]  Presented with COPD exacerbation pCO2 71,   Treated with BIPAP, levaquin and steriods.  Improved and resolved  Presumptive COPD Dx on Adviar, No priir PFT available  Was sent home on Oxygen, not using  Quit smoking  No cough, No SOB, No wheezing  Work at [employer]: Advair, and  Ventilin DALILA            Review of Systems   Respiratory: Negative for cough, sputum production, shortness of breath, wheezing, asthma nighttime symptoms, dyspnea on extertion and use of rescue inhaler.    All other systems reviewed and are negative.      Outpatient Encounter Medications as of 6/9/2020   Medication Sig Dispense Refill    amiodarone (PACERONE) 200 MG Tab Take 1 tablet (200 mg total) by mouth once daily. 30 tablet 11    aspirin (ECOTRIN) 81 MG EC tablet Take 81 mg by mouth.      carvedilol (COREG) 25 MG tablet Take 1 tablet (25 mg total) by mouth 2 (two) times daily with meals. 180 tablet 3    fluticasone propion-salmeterol 115-21 mcg/dose (ADVAIR HFA) 115-21 mcg/actuation HFAA inhaler Inhale 2 puffs into the lungs every 12 (twelve) hours. Controller 12 g 0    folic acid (FOLVITE) 800 MCG Tab Take  "400 mcg by mouth once daily.      furosemide (LASIX) 20 MG tablet Take 1 tablet (20 mg total) by mouth every other day. 15 tablet 0    lisinopriL 10 MG tablet Take 1 tablet (10 mg total) by mouth once daily. 30 tablet 0    potassium chloride SA (K-DUR,KLOR-CON) 10 MEQ tablet Take 1 tablet (10 mEq total) by mouth every other day. 30 tablet 11    thiamine (VITAMIN B-1) 100 MG tablet Take 250 mg by mouth once daily.      albuterol (PROVENTIL/VENTOLIN HFA) 90 mcg/actuation inhaler Inhale 1-2 puffs into the lungs every 6 (six) hours as needed for Wheezing. Rescue 1 g 3     No facility-administered encounter medications on file as of 6/9/2020.        Objective:     Vital Signs (Most Recent)  Vital Signs  Pulse: (!) 53  Resp: 16  SpO2: 95 %  BP: 116/64  Height and Weight  Height: 5' 3" (160 cm)  Weight: 62.8 kg (138 lb 7.2 oz)  BSA (Calculated - sq m): 1.67 sq meters  BMI (Calculated): 24.5  Weight in (lb) to have BMI = 25: 140.8]  Wt Readings from Last 2 Encounters:   06/09/20 62.8 kg (138 lb 7.2 oz)   06/03/20 62 kg (136 lb 11 oz)       Physical Exam   Constitutional: She is oriented to person, place, and time. She appears well-developed and well-nourished.   HENT:   Head: Normocephalic.   Mouth/Throat: No oropharyngeal exudate. Mallampati Score: II.   Neck: Normal range of motion.   Cardiovascular: Normal rate.   Pulmonary/Chest: Normal expansion and effort normal.   Abdominal: Soft.   Musculoskeletal: Normal range of motion.   Lymphadenopathy:     She has no cervical adenopathy.     She has no axillary adenopathy.   Neurological: She is alert and oriented to person, place, and time.   Skin: Skin is warm and dry.   Psychiatric: She has a normal mood and affect.   Nursing note and vitals reviewed.      Laboratory  Lab Results   Component Value Date    WBC 12.20 05/28/2020    RBC 4.87 05/28/2020    HGB 15.5 05/28/2020    HCT 49.1 (H) 05/28/2020     (H) 05/28/2020    MCH 31.8 (H) 05/28/2020    [facility] 31.6 (L) " 05/28/2020    RDW 13.2 05/28/2020     05/28/2020    MPV 11.2 05/28/2020    GRAN 10.3 (H) 05/28/2020    GRAN 84.0 (H) 05/28/2020    LYMPH 1.0 05/28/2020    LYMPH 8.5 (L) 05/28/2020    MONO 0.8 05/28/2020    MONO 6.9 05/28/2020    EOS 0.0 05/28/2020    BASO 0.01 05/28/2020    EOSINOPHIL 0.0 05/28/2020    BASOPHIL 0.1 05/28/2020       BMP  Lab Results   Component Value Date     06/02/2020    K 4.9 06/02/2020     06/02/2020    CO2 27 06/02/2020    BUN 27 (H) 06/02/2020    CREATININE 1.1 06/02/2020    CALCIUM 9.9 06/02/2020    ANIONGAP 10 06/02/2020    ESTGFRAFRICA >60 06/02/2020    EGFRNONAA 56 (A) 06/02/2020    AST 26 05/27/2020    ALT 32 05/27/2020    PROT 7.3 05/27/2020       Lab Results   Component Value Date     (H) 06/02/2020     (H) 05/28/2020    BNP 2,066 (H) 05/26/2020    BNP 28 09/02/2015    BNP 67 09/03/2014    BNP 2,560 (H) 06/06/2013       Lab Results   Component Value Date    TSH 0.332 (L) 05/31/2013       No results found for: SEDRATE    No results found for: CRP  No results found for: IGE     No results found for: ASPERGILLUS  No results found for: AFUMIGATUSCL     No results found for: ACE     Diagnostic Results:  I have personally reviewed today the following studies:    ECHO  · Concentric left ventricular hypertrophy.  · Mildly decreased left ventricular systolic function. The estimated ejection fraction is 45%.  · Grade I (mild) left ventricular diastolic dysfunction consistent with impaired relaxation.  · Normal right ventricular systolic function.  · Global hypokinetic wall motion.  · Moderate left atrial enlargement.  · Normal central venous pressure (3 mmHg).    Assessment/Plan:     Problem List Items Addressed This Visit     Chronic systolic heart failure    Cardiomyopathy, dilated, nonischemic    CHF (NYHA class III, ACC/AHA stage C) (Chronic)    Current Assessment & Plan     Strict I/O  Low salt diet  Preload and afterload reduction  LASIX  LISINOPRIL  COREG          Tobacco abuse    Current Assessment & Plan     referal to smoking cessation         Essential hypertension    Current Assessment & Plan     STABLE: LISINOPRIL         COPD, severity to be determined - Primary    Current Assessment & Plan     Based on Hx  Need Formal PFT  Meds: Advair HFA , Added Ventolin HFA  Discussed smoking cessation         Relevant Medications    albuterol (PROVENTIL/VENTOLIN HFA) 90 mcg/actuation inhaler    Other Relevant Orders    CT Chest Lung Screening Low Dose    COVID-19 Routine Screening    Spirometry without Bronchodilator    Stress test, pulmonary    Ambulatory Referral to Pulmonary Disease Management    Alpha 1 Antitrypsin Phenotype    Alpha-1-Antitrypsin      Other Visit Diagnoses     Screening for cancer        Relevant Orders    CT Chest Lung Screening Low Dose        Requested Prescriptions     Signed Prescriptions Disp Refills    albuterol (PROVENTIL/VENTOLIN HFA) 90 mcg/actuation inhaler 1 g 3     Sig: Inhale 1-2 puffs into the lungs every 6 (six) hours as needed for Wheezing. Rescue      Orders Placed This Encounter   Procedures    CT Chest Lung Screening Low Dose     Schedule follow up office visit after test.     Standing Status:   Future     Standing Expiration Date:   12/9/2021    COVID-19 Routine Screening     Standing Status:   Future     Standing Expiration Date:   8/8/2021     Order Specific Question:   Is the patient symptomatic?     Answer:   No     Order Specific Question:   Diagnosis:     Answer:   Shortness of breath [786.05.ICD-9-CM]    Alpha 1 Antitrypsin Phenotype     Standing Status:   Future     Standing Expiration Date:   8/8/2021    Alpha-1-Antitrypsin     Standing Status:   Future     Standing Expiration Date:   8/8/2021    Ambulatory Referral to Pulmonary Disease Management     Standing Status:   Future     Standing Expiration Date:   7/9/2021     Referral Priority:   Routine     Referral Type:   Consultation     Referral Reason:   Specialty  Services Required     Requested Specialty:   Pulmonary Disease     Number of Visits Requested:   1    Spirometry without Bronchodilator     Standing Status:   Future     Standing Expiration Date:   6/9/2021    Stress test, pulmonary     Standing Status:   Future     Standing Expiration Date:   6/9/2021     Order Specific Question:   Reason for study     Answer:   Functional status        No Pulmonary contraindication to return to work [facility] department    Follow up in about 3 months (around 9/9/2020), or LDCT, MARISA, Smoking cessation, [place], [employer]MWD, , 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    This note was prepared using voice recognition system and is likely to have sound alike errors that may have been overlooked even after proof reading.  Please call me with any questions    Discussed diagnosis, its evaluation, treatment and usual course. All questions answered.      Herson Le MD

## 2020-06-09 NOTE — PATIENT INSTRUCTIONS
Treatment for COPD    Your healthcare provider will prescribe the best treatments for your COPD.  Treatment  Recommendations include the following:  · Medicines. Some medicines help relieve symptoms when you have them. Others are taken daily to control inflammation in the lungs. Always take your medicines as prescribed. Learn the names of your medicines, as well as how and when to use them.  · Oxygen therapy. Oxygen may be prescribed if tests show that your blood contains too little oxygen.  · Smoking. If you smoke, quit. Smoking is the main cause of COPD. Quitting will help you be able to better manage your COPD. Ask your healthcare provider about ways to help you quit smoking.  · Avoiding infections. Infections, like a cold or the flu, can cause your symptoms to worsen. Try to stay away from people who are sick. Wash your hands often. And, ask your healthcare provider about vaccines for the flu and pneumonia.  Coping with shortness of breath  Coping tips include the following:  · Exercise. Try to be as active as possible. This will improve energy levels and strengthen your muscles, so you can do more.  · Breathing techniques. Ask your healthcare provider or nurse to show you how to do pursed-lip breathing.  · Balance rest and activity. Each day, try to balance rest periods with activity. For example, you might start the day with getting dressed and eating breakfast, then relax and read the paper. After that, take a brief walk. And then sit with your feet up for a while.  · Pulmonary rehabilitation. Ask your provider, or call your local hospital to find out about pulmonary rehab programs. The programs help with managing your disease, breathing techniques, exercise, support and counseling.  · Healthy eating. Eating a healthy, balanced diet and making an effort to maintain your ideal weight are important to staying as healthy as possible. Make sure you have a lot of fruit and vegetables every day, as well as  balanced portions of whole grains, lean meats and fish, and low-fat dairy products.  Date Last Reviewed: 5/1/2016 © 2000-2017 [employer]. [address] [place] [address], [place] [address]. All rights reserved. This information is not intended as a substitute for professional medical care. Always follow your healthcare professional's instructions.        What is COPD?  COPD stands for chronic obstructive pulmonary disease. It means the airways in your lungs are blocked (obstructed). Because of this, it is hard to breathe. You may have trouble with daily activities because of shortness of breath. Over time the shortness of breath usually worsens making it more and more difficult to take care of yourself and take part in activities. Chronic bronchitis and emphysema are two common types of COPD.  What happens in chronic bronchitis?    The cells in the airways make more mucus than normal. The mucus builds up, narrowing the airways. This means less air travels into and out of the lungs. The lining of the airways may also become inflamed (swollen) and causes the airways to narrow even more.        What happens in emphysema?    The small airways are damaged and lose their stretchiness. The airways collapse when you exhale, causing air to get trapped in the air sacs. This means that less oxygen enters the blood vessels and less oxygen is delivered to all of the cells of your body. This makes it hard to breathe.     Damage to cilia    Cilia are small hairs that line and protect the airways. Smoking damages the cilia. Damaged cilia cant sweep mucus and particles away. Some of the cilia are destroyed. This damage worsens COPD.  How did I get COPD?  Most people get COPD from smoking. Cigarette smoke damages lungs, which can develop into COPD over many years.  How COPD affects you  COPD makes you work harder to breathe. Air may get trapped in the lungs, which prevents your lungs from filling completely with fresh  oxygen-filled air when you inhale (breathe in). It's harder to take deep breaths especially when you are active and start breathing faster. Over time, your lungs may become enlarged filling the lung with air that does not transfer oxygen into the blood. These problems cause you to have shortness of breath (also called dyspnea). Wheezing (hoarse, whistling breathing), chronic cough, and fatigue (feeling tired and worn out) are also common.   Date Last Reviewed: 5/1/2016  © 8549-0760 [employer]. [address] [place] [address]. All rights reserved. This information is not intended as a substitute for professional medical care. Always follow your healthcare professional's instructions.

## 2020-06-10 ENCOUNTER — TELEPHONE (OUTPATIENT)
Dept: CARDIOLOGY | Facility: CLINIC | Age: 55
End: 2020-06-10

## 2020-06-10 NOTE — LETTER
Jinny 10, 2020    Melissa Petty  [address] [facility] [address]             [facility]  [address] [place] [id number]  Phone: [phone]  Fax: [phone] To Whom It May Concern:    Melissa Petty has systolic heart failure, Stage C, FC-3. She is followed by Cardiology-Dr. Welch and has had a recent hospital admission for acutely decompensated heart failure. At the present time we are in the process of optimizing her medical therapy. It is advised that she not return to work at this time. Her next appt is 7/2/20 and we will reevaluate at this time.       If you have any questions or concerns, please don't hesitate to call.    Sincerely,        Ye Welch MD

## 2020-06-10 NOTE — TELEPHONE ENCOUNTER
WE WILL PROVIDE A NOTE RECOMMENDING HER NO TO RETURN TO WORK UNTIL SHE IS FULLY COMPENSATED AND OMT

## 2020-06-10 NOTE — TELEPHONE ENCOUNTER
Pt taking furosemide 20mg every other day.     She feels she is doing good. No worsening HF symptoms.   bp 116/64 P-53.     Also pt is requesing a letter stating she is unable to work.   Faxed to [phone]  Claim #[id number]

## 2020-06-10 NOTE — TELEPHONE ENCOUNTER
----- Message from Carmen Cantrell LPN sent at 6/3/2020  2:34 PM CDT -----  MD Carmen Martin LPN         PHONE CALL IN ONE WEEK TO CHECK PROGRESS AND POSSIBLE DC ORAL DIURETICS

## 2020-06-23 ENCOUNTER — TELEPHONE (OUTPATIENT)
Dept: PULMONOLOGY | Facility: CLINIC | Age: 55
End: 2020-06-23

## 2020-06-23 NOTE — TELEPHONE ENCOUNTER
Chronic Disease Management    Called patient to schedule initial Pulmonary Disease Management appointment.                No answer. Left message.

## 2020-06-23 NOTE — TELEPHONE ENCOUNTER
Patient returned call to PDM. Patient asked to call back when she is ready to schedule. Provided patient with direct contact number for PDM.

## 2020-06-29 ENCOUNTER — LAB VISIT (OUTPATIENT)
Dept: LAB | Facility: HOSPITAL | Age: 55
End: 2020-06-29
Attending: NUCLEAR MEDICINE
Payer: COMMERCIAL

## 2020-06-29 DIAGNOSIS — I50.9 CHF (NYHA CLASS III, ACC/AHA STAGE C): Chronic | ICD-10-CM

## 2020-06-29 DIAGNOSIS — I42.0 CARDIOMYOPATHY, DILATED, NONISCHEMIC: ICD-10-CM

## 2020-06-29 LAB
ANION GAP SERPL CALC-SCNC: 5 MMOL/L (ref 8–16)
BNP SERPL-MCNC: 333 PG/ML (ref 0–99)
BUN SERPL-MCNC: 15 MG/DL (ref 6–20)
CALCIUM SERPL-MCNC: 8.6 MG/DL (ref 8.7–10.5)
CHLORIDE SERPL-SCNC: 107 MMOL/L (ref 95–110)
CO2 SERPL-SCNC: 27 MMOL/L (ref 23–29)
CREAT SERPL-MCNC: 0.8 MG/DL (ref 0.5–1.4)
EST. GFR  (AFRICAN AMERICAN): >60 ML/MIN/1.73 M^2
EST. GFR  (NON AFRICAN AMERICAN): >60 ML/MIN/1.73 M^2
GLUCOSE SERPL-MCNC: 91 MG/DL (ref 70–110)
POTASSIUM SERPL-SCNC: 3.9 MMOL/L (ref 3.5–5.1)
SODIUM SERPL-SCNC: 139 MMOL/L (ref 136–145)

## 2020-06-29 PROCEDURE — 80048 BASIC METABOLIC PNL TOTAL CA: CPT

## 2020-06-29 PROCEDURE — 36415 COLL VENOUS BLD VENIPUNCTURE: CPT

## 2020-06-29 PROCEDURE — 83880 ASSAY OF NATRIURETIC PEPTIDE: CPT

## 2020-07-02 ENCOUNTER — OFFICE VISIT (OUTPATIENT)
Dept: CARDIOLOGY | Facility: CLINIC | Age: 55
End: 2020-07-02
Payer: COMMERCIAL

## 2020-07-02 VITALS
HEART RATE: 62 BPM | SYSTOLIC BLOOD PRESSURE: 174 MMHG | DIASTOLIC BLOOD PRESSURE: 80 MMHG | WEIGHT: 143.31 LBS | BODY MASS INDEX: 25.39 KG/M2 | HEIGHT: 63 IN | OXYGEN SATURATION: 97 %

## 2020-07-02 DIAGNOSIS — I49.3 PVC (PREMATURE VENTRICULAR CONTRACTION): ICD-10-CM

## 2020-07-02 DIAGNOSIS — I50.22 CHRONIC SYSTOLIC HEART FAILURE: ICD-10-CM

## 2020-07-02 DIAGNOSIS — I50.9 CHF (NYHA CLASS III, ACC/AHA STAGE C): Primary | Chronic | ICD-10-CM

## 2020-07-02 PROCEDURE — 99214 OFFICE O/P EST MOD 30 MIN: CPT | Mod: S$GLB,,, | Performed by: NUCLEAR MEDICINE

## 2020-07-02 PROCEDURE — 99999 PR PBB SHADOW E&M-EST. PATIENT-LVL III: ICD-10-PCS | Mod: PBBFAC,,, | Performed by: NUCLEAR MEDICINE

## 2020-07-02 PROCEDURE — 3077F PR MOST RECENT SYSTOLIC BLOOD PRESSURE >= 140 MM HG: ICD-10-PCS | Mod: CPTII,S$GLB,, | Performed by: NUCLEAR MEDICINE

## 2020-07-02 PROCEDURE — 3008F BODY MASS INDEX DOCD: CPT | Mod: CPTII,S$GLB,, | Performed by: NUCLEAR MEDICINE

## 2020-07-02 PROCEDURE — 3079F DIAST BP 80-89 MM HG: CPT | Mod: CPTII,S$GLB,, | Performed by: NUCLEAR MEDICINE

## 2020-07-02 PROCEDURE — 99999 PR PBB SHADOW E&M-EST. PATIENT-LVL III: CPT | Mod: PBBFAC,,, | Performed by: NUCLEAR MEDICINE

## 2020-07-02 PROCEDURE — 3079F PR MOST RECENT DIASTOLIC BLOOD PRESSURE 80-89 MM HG: ICD-10-PCS | Mod: CPTII,S$GLB,, | Performed by: NUCLEAR MEDICINE

## 2020-07-02 PROCEDURE — 3008F PR BODY MASS INDEX (BMI) DOCUMENTED: ICD-10-PCS | Mod: CPTII,S$GLB,, | Performed by: NUCLEAR MEDICINE

## 2020-07-02 PROCEDURE — 3077F SYST BP >= 140 MM HG: CPT | Mod: CPTII,S$GLB,, | Performed by: NUCLEAR MEDICINE

## 2020-07-02 PROCEDURE — 99214 PR OFFICE/OUTPT VISIT, EST, LEVL IV, 30-39 MIN: ICD-10-PCS | Mod: S$GLB,,, | Performed by: NUCLEAR MEDICINE

## 2020-07-02 RX ORDER — AMIODARONE HYDROCHLORIDE 200 MG/1
100 TABLET ORAL DAILY
Qty: 30 TABLET | Refills: 11
Start: 2020-07-02 | End: 2020-10-14

## 2020-07-02 RX ORDER — POTASSIUM CHLORIDE 750 MG/1
10 TABLET, EXTENDED RELEASE ORAL
Qty: 30 TABLET | Refills: 11
Start: 2020-07-02 | End: 2021-06-02

## 2020-07-02 RX ORDER — LISINOPRIL 10 MG/1
10 TABLET ORAL 2 TIMES DAILY
Qty: 60 TABLET | Refills: 6 | Status: SHIPPED | OUTPATIENT
Start: 2020-07-02 | End: 2020-12-30 | Stop reason: SDUPTHER

## 2020-07-02 RX ORDER — FUROSEMIDE 20 MG/1
20 TABLET ORAL
Qty: 15 TABLET | Refills: 0
Start: 2020-07-02 | End: 2021-02-01

## 2020-07-02 NOTE — PROGRESS NOTES
Subjective:   Patient ID:  Melissa Ptety is a 55 y.o. female who presents for follow-up of Congestive Heart Failure (HFrEF, STAGE C), Cardiomyopathy (DILATED, NON ISCHEMIC), and Hypertension (ESSENTIAL)      HPI FEELING BETTER. NO ORTHOPNEA OR PND  PAINTER IMPROVED  NO RECURRENT PALPITATIONS  NO NEAR SYNCOPE OR SYNCOPE  NO EDEMA. NO CALVE TENDERNESS  NO FOCAL CNS SYMPTOMS OR SIGNS TO SUGGEST TIA OR STROKE  CARD MED- GOOD COMPLIANCE, NO SIDE EFFECTS    Review of Systems   Constitution: Negative for chills, fever, night sweats, weight gain and weight loss.   HENT: Negative for nosebleeds.    Eyes: Negative for blurred vision, double vision and visual disturbance.   Cardiovascular: Negative for chest pain, dyspnea on exertion, irregular heartbeat, leg swelling, orthopnea, palpitations, paroxysmal nocturnal dyspnea and syncope.   Respiratory: Negative for cough, hemoptysis and wheezing.    Endocrine: Negative for polydipsia and polyuria.   Hematologic/Lymphatic: Does not bruise/bleed easily.   Skin: Negative for rash.   Musculoskeletal: Negative for joint pain, joint swelling, muscle weakness and myalgias.   Gastrointestinal: Negative for abdominal pain, hematemesis, jaundice and melena.   Genitourinary: Negative for dysuria, hematuria and nocturia.   Neurological: Negative for dizziness, focal weakness, headaches, sensory change and weakness.   Psychiatric/Behavioral: Negative for depression. The patient does not have insomnia and is not nervous/anxious.      Family History   Problem Relation Age of Onset    Heart failure Mother     Hypertension Brother      Past Medical History:   Diagnosis Date    Cardiomyopathy     CHF (congestive heart failure)     COPD exacerbation 5/26/2020    Hypertension      Social History     Socioeconomic History    Marital status: Single     Spouse name: Not on file    Number of children: Not on file    Years of education: Not on file    Highest education level: Not on file    Occupational History    Not on file   Social Needs    Financial resource strain: Not on file    Food insecurity     Worry: Not on file     Inability: Not on file    Transportation needs     Medical: Not on file     Non-medical: Not on file   Tobacco Use    Smoking status: Current Every Day Smoker     Packs/day: 1.00     Years: 32.00     Pack years: 32.00    Smokeless tobacco: Never Used   Substance and Sexual Activity    Alcohol use: Yes     Comment: Occassionally    Drug use: No    Sexual activity: Not on file   Lifestyle    Physical activity     Days per week: Not on file     Minutes per session: Not on file    Stress: Not on file   Relationships    Social connections     Talks on phone: Not on file     Gets together: Not on file     Attends [employer] service: Not on file     Active member of club or organization: Not on file     Attends meetings of clubs or organizations: Not on file     Relationship status: Not on file   Other Topics Concern    Not on file   Social History Narrative    Not on file     Current Outpatient Medications on File Prior to Visit   Medication Sig Dispense Refill    carvedilol (COREG) 25 MG tablet Take 1 tablet (25 mg total) by mouth 2 (two) times daily with meals. 180 tablet 3    folic acid (FOLVITE) 800 MCG Tab Take 400 mcg by mouth once daily.      thiamine (VITAMIN B-1) 100 MG tablet Take 250 mg by mouth once daily.      [DISCONTINUED] amiodarone (PACERONE) 200 MG Tab Take 1 tablet (200 mg total) by mouth once daily. 30 tablet 11    [DISCONTINUED] aspirin (ECOTRIN) 81 MG EC tablet Take 81 mg by mouth.      [DISCONTINUED] lisinopriL 10 MG tablet Take 1 tablet (10 mg total) by mouth once daily. 30 tablet 0    albuterol (PROVENTIL/VENTOLIN HFA) 90 mcg/actuation inhaler Inhale 1-2 puffs into the lungs every 6 (six) hours as needed for Wheezing. Rescue 1 g 3    fluticasone propion-salmeterol 115-21 mcg/dose (ADVAIR HFA) 115-21 mcg/actuation HFAA inhaler Inhale 2  puffs into the lungs every 12 (twelve) hours. Controller 12 g 0    [DISCONTINUED] furosemide (LASIX) 20 MG tablet Take 1 tablet (20 mg total) by mouth every other day. (Patient not taking: Reported on 7/2/2020) 15 tablet 0    [DISCONTINUED] potassium chloride SA (K-DUR,KLOR-CON) 10 MEQ tablet Take 1 tablet (10 mEq total) by mouth every other day. (Patient not taking: Reported on 7/2/2020) 30 tablet 11     No current facility-administered medications on file prior to visit.      Review of patient's allergies indicates:   Allergen Reactions    Pcn [penicillins]      Unknown reaction         Objective:     Physical Exam   Constitutional: She is oriented to person, place, and time. She appears well-developed. No distress.   HENT:   Head: Normocephalic.   Eyes: Pupils are equal, round, and reactive to light. Conjunctivae are normal. No scleral icterus.   Neck: Normal range of motion. Neck supple. Normal carotid pulses, no hepatojugular reflux and no JVD present. Carotid bruit is not present. No edema present. No thyroid mass and no thyromegaly present.   Cardiovascular: Normal rate, regular rhythm, S1 normal, S2 normal, normal heart sounds and intact distal pulses. PMI is not displaced. Exam reveals no gallop and no friction rub.   No murmur heard.  Pulses:       Carotid pulses are 2+ on the right side and 2+ on the left side.       Radial pulses are 2+ on the right side and 2+ on the left side.        Femoral pulses are 2+ on the right side and 2+ on the left side.       Popliteal pulses are 2+ on the right side and 2+ on the left side.        Dorsalis pedis pulses are 2+ on the right side and 2+ on the left side.        Posterior tibial pulses are 2+ on the right side and 2+ on the left side.   Pulmonary/Chest: Effort normal and breath sounds normal. She has no wheezes. She has no rales. She exhibits no tenderness.   Abdominal: Soft. Bowel sounds are normal. She exhibits no pulsatile midline mass and no mass. There  is no hepatosplenomegaly. There is no abdominal tenderness.   Musculoskeletal: Normal range of motion.         General: No tenderness or edema.      Cervical back: Normal.      Thoracic back: Normal.      Lumbar back: Normal.   Lymphadenopathy:     She has no cervical adenopathy.     She has no axillary adenopathy.        Right: No supraclavicular adenopathy present.        Left: No supraclavicular adenopathy present.   Neurological: She is alert and oriented to person, place, and time. She has normal strength and normal reflexes. No sensory deficit. Gait normal.   Skin: Skin is warm. No rash noted. No cyanosis. No pallor. Nails show no clubbing.   Psychiatric: She has a normal mood and affect. Her speech is normal and behavior is normal. Cognition and memory are normal.       Assessment:     1. CHF (NYHA class III, ACC/AHA stage C)    2. Chronic systolic heart failure    3. PVC (premature ventricular contraction)        Plan:     CHF (NYHA class III, ACC/AHA stage C)  CLINICALLY COMPENSATED  PLAN TO CHANGE ORAL DIURETICS TO PRN  -     furosemide (LASIX) 20 MG tablet; Take 1 tablet (20 mg total) by mouth as needed.  Dispense: 15 tablet; Refill: 0  -     potassium chloride SA (K-DUR,KLOR-CON) 10 MEQ tablet; Take 1 tablet (10 mEq total) by mouth as needed.  Dispense: 30 tablet; Refill: 11    Chronic systolic heart failure  NO CARDIO EMBOLISM  OMT LISINOPRIL  -     amiodarone (PACERONE) 200 MG Tab; Take 0.5 tablets (100 mg total) by mouth once daily.  Dispense: 30 tablet; Refill: 11  -     lisinopriL 10 MG tablet; Take 1 tablet (10 mg total) by mouth 2 (two) times daily.  Dispense: 60 tablet; Refill: 6    PVC (premature ventricular contraction)  CONTROLLED  DECREASE AMIODARONE    CAN RETURN TO WORK    RETURN IN 3 MONTHS  -     amiodarone (PACERONE) 200 MG Tab; Take 0.5 tablets (100 mg total) by mouth once daily.  Dispense: 30 tablet; Refill: 11

## 2020-09-22 DIAGNOSIS — J44.9 COPD, SEVERITY TO BE DETERMINED: Primary | ICD-10-CM

## 2020-10-14 ENCOUNTER — OFFICE VISIT (OUTPATIENT)
Dept: CARDIOLOGY | Facility: CLINIC | Age: 55
End: 2020-10-14
Payer: COMMERCIAL

## 2020-10-14 VITALS
SYSTOLIC BLOOD PRESSURE: 134 MMHG | BODY MASS INDEX: 25.55 KG/M2 | WEIGHT: 144.19 LBS | HEIGHT: 63 IN | HEART RATE: 77 BPM | DIASTOLIC BLOOD PRESSURE: 76 MMHG | OXYGEN SATURATION: 94 %

## 2020-10-14 DIAGNOSIS — I50.22 CHRONIC SYSTOLIC HEART FAILURE, ACC/AHA STAGE C: Primary | ICD-10-CM

## 2020-10-14 DIAGNOSIS — I42.0 CARDIOMYOPATHY, DILATED, NONISCHEMIC: ICD-10-CM

## 2020-10-14 DIAGNOSIS — I10 ESSENTIAL HYPERTENSION: ICD-10-CM

## 2020-10-14 PROCEDURE — 99214 OFFICE O/P EST MOD 30 MIN: CPT | Mod: S$GLB,,, | Performed by: NUCLEAR MEDICINE

## 2020-10-14 PROCEDURE — 3008F PR BODY MASS INDEX (BMI) DOCUMENTED: ICD-10-PCS | Mod: CPTII,S$GLB,, | Performed by: NUCLEAR MEDICINE

## 2020-10-14 PROCEDURE — 3078F PR MOST RECENT DIASTOLIC BLOOD PRESSURE < 80 MM HG: ICD-10-PCS | Mod: CPTII,S$GLB,, | Performed by: NUCLEAR MEDICINE

## 2020-10-14 PROCEDURE — 99214 PR OFFICE/OUTPT VISIT, EST, LEVL IV, 30-39 MIN: ICD-10-PCS | Mod: S$GLB,,, | Performed by: NUCLEAR MEDICINE

## 2020-10-14 PROCEDURE — 99999 PR PBB SHADOW E&M-EST. PATIENT-LVL III: ICD-10-PCS | Mod: PBBFAC,,, | Performed by: NUCLEAR MEDICINE

## 2020-10-14 PROCEDURE — 3075F PR MOST RECENT SYSTOLIC BLOOD PRESS GE 130-139MM HG: ICD-10-PCS | Mod: CPTII,S$GLB,, | Performed by: NUCLEAR MEDICINE

## 2020-10-14 PROCEDURE — 3078F DIAST BP <80 MM HG: CPT | Mod: CPTII,S$GLB,, | Performed by: NUCLEAR MEDICINE

## 2020-10-14 PROCEDURE — 3008F BODY MASS INDEX DOCD: CPT | Mod: CPTII,S$GLB,, | Performed by: NUCLEAR MEDICINE

## 2020-10-14 PROCEDURE — 99999 PR PBB SHADOW E&M-EST. PATIENT-LVL III: CPT | Mod: PBBFAC,,, | Performed by: NUCLEAR MEDICINE

## 2020-10-14 PROCEDURE — 3075F SYST BP GE 130 - 139MM HG: CPT | Mod: CPTII,S$GLB,, | Performed by: NUCLEAR MEDICINE

## 2020-10-14 NOTE — PROGRESS NOTES
Subjective:   Patient ID:  Melissa Petty is a 55 y.o. female who presents for follow-up of Congestive Heart Failure (HFrEF, RECOVERED, STAGE C, FC 2), NON ISCHEMIC CMP, and Hypertension (ESSENTIAL)      HPI  DOING WELL. NO RECENT HOSPITALIZATIONS OR ED VISITS FOR ADHF.  ARRHYTHMIAS OR CVA  CARD MED - GOOD COMPLIANCE, NO SIDE EFFECTS  NO RECURRENT PALPITATIONS  NO NEAR SYNCOPE OR SYNCOPE  ORDINARY DAILY ACTIVITIES AND WORK ACTIVITIES- DELI AT [facility]-  WELL TOLERATED  NO ORTHOPNEA OR PND  NO CHEST DISCOMFORT  NO ABDOMINAL DISCOMFORT  GOOD APPETITE  NO EDEMA. NO CALVE TENDERNESS  NO FOCAL CNS SYMPTOMS OR SIGNS TO SUGGEST TIA OR STROKE    Review of Systems   Constitution: Negative for chills, fever, night sweats, weight gain and weight loss.   HENT: Negative for nosebleeds.    Eyes: Negative for blurred vision, double vision and visual disturbance.   Cardiovascular: Negative for chest pain, dyspnea on exertion, irregular heartbeat, leg swelling, orthopnea, palpitations, paroxysmal nocturnal dyspnea and syncope.   Respiratory: Negative for cough, hemoptysis and wheezing.    Endocrine: Negative for polydipsia and polyuria.   Hematologic/Lymphatic: Does not bruise/bleed easily.   Skin: Negative for rash.   Musculoskeletal: Negative for joint pain, joint swelling, muscle weakness and myalgias.   Gastrointestinal: Negative for abdominal pain, hematemesis, jaundice and melena.   Genitourinary: Negative for dysuria, hematuria and nocturia.   Neurological: Negative for dizziness, focal weakness, headaches, sensory change and weakness.   Psychiatric/Behavioral: Negative for depression. The patient does not have insomnia and is not nervous/anxious.      Family History   Problem Relation Age of Onset    Heart failure Mother     Hypertension Brother      Past Medical History:   Diagnosis Date    Cardiomyopathy     CHF (congestive heart failure)     COPD exacerbation 5/26/2020    Hypertension      Social History      Socioeconomic History    Marital status: Single     Spouse name: Not on file    Number of children: Not on file    Years of education: Not on file    Highest education level: Not on file   Occupational History    Not on file   Social Needs    Financial resource strain: Not on file    Food insecurity     Worry: Not on file     Inability: Not on file    Transportation needs     Medical: Not on file     Non-medical: Not on file   Tobacco Use    Smoking status: Current Every Day Smoker     Packs/day: 1.00     Years: 32.00     Pack years: 32.00    Smokeless tobacco: Never Used   Substance and Sexual Activity    Alcohol use: Yes     Comment: Occassionally    Drug use: No    Sexual activity: Not on file   Lifestyle    Physical activity     Days per week: Not on file     Minutes per session: Not on file    Stress: Not on file   Relationships    Social connections     Talks on phone: Not on file     Gets together: Not on file     Attends Muslim service: Not on file     Active member of club or organization: Not on file     Attends meetings of clubs or organizations: Not on file     Relationship status: Not on file   Other Topics Concern    Not on file   Social History Narrative    Not on file     Current Outpatient Medications on File Prior to Visit   Medication Sig Dispense Refill    amiodarone (PACERONE) 200 MG Tab Take 0.5 tablets (100 mg total) by mouth once daily. 30 tablet 11    carvedilol (COREG) 25 MG tablet Take 1 tablet (25 mg total) by mouth 2 (two) times daily with meals. 180 tablet 3    folic acid (FOLVITE) 800 MCG Tab Take 400 mcg by mouth once daily.      lisinopriL 10 MG tablet Take 1 tablet (10 mg total) by mouth 2 (two) times daily. 60 tablet 6    thiamine (VITAMIN B-1) 100 MG tablet Take 250 mg by mouth once daily.      albuterol (PROVENTIL/VENTOLIN HFA) 90 mcg/actuation inhaler Inhale 1-2 puffs into the lungs every 6 (six) hours as needed for Wheezing. Rescue 1 g 3     fluticasone propion-salmeterol 115-21 mcg/dose (ADVAIR HFA) 115-21 mcg/actuation HFAA inhaler Inhale 2 puffs into the lungs every 12 (twelve) hours. Controller 12 g 0    furosemide (LASIX) 20 MG tablet Take 1 tablet (20 mg total) by mouth as needed. (Patient not taking: Reported on 10/14/2020) 15 tablet 0    potassium chloride SA (K-DUR,KLOR-CON) 10 MEQ tablet Take 1 tablet (10 mEq total) by mouth as needed. (Patient not taking: Reported on 10/14/2020) 30 tablet 11     No current facility-administered medications on file prior to visit.      Review of patient's allergies indicates:   Allergen Reactions    Pcn [penicillins]      Unknown reaction         Objective:     Physical Exam   Constitutional: She is oriented to person, place, and time. She appears well-developed. No distress.   HENT:   Head: Normocephalic.   Eyes: Pupils are equal, round, and reactive to light. Conjunctivae are normal. No scleral icterus.   Neck: Normal range of motion. Neck supple. Normal carotid pulses, no hepatojugular reflux and no JVD present. Carotid bruit is not present. No edema present. No thyroid mass and no thyromegaly present.   Cardiovascular: Normal rate, regular rhythm, S1 normal, S2 normal, normal heart sounds and intact distal pulses. PMI is not displaced. Exam reveals no gallop and no friction rub.   No murmur heard.  Pulses:       Carotid pulses are 2+ on the right side and 2+ on the left side.       Radial pulses are 2+ on the right side and 2+ on the left side.        Femoral pulses are 2+ on the right side and 2+ on the left side.       Popliteal pulses are 2+ on the right side and 2+ on the left side.        Dorsalis pedis pulses are 2+ on the right side and 2+ on the left side.        Posterior tibial pulses are 2+ on the right side and 2+ on the left side.   Pulmonary/Chest: Effort normal and breath sounds normal. She has no wheezes. She has no rales. She exhibits no tenderness.   Abdominal: Soft. Bowel sounds are  normal. She exhibits no pulsatile midline mass and no mass. There is no hepatosplenomegaly. There is no abdominal tenderness.   Musculoskeletal: Normal range of motion.         General: No tenderness or edema.      Cervical back: Normal.      Thoracic back: Normal.      Lumbar back: Normal.   Lymphadenopathy:     She has no cervical adenopathy.     She has no axillary adenopathy.        Right: No supraclavicular adenopathy present.        Left: No supraclavicular adenopathy present.   Neurological: She is alert and oriented to person, place, and time. She has normal strength and normal reflexes. No sensory deficit. Gait normal.   Skin: Skin is warm. No rash noted. No cyanosis. No pallor. Nails show no clubbing.   Psychiatric: She has a normal mood and affect. Her speech is normal and behavior is normal. Cognition and memory are normal.       Assessment:     1. Chronic systolic heart failure, ACC/AHA stage C    2. Cardiomyopathy, dilated, nonischemic    3. Essential hypertension        Plan:     Chronic systolic heart failure, ACC/AHA stage C  CLINICALLY WELL COMPENSATED  CARD MED WELL TOLERATED    Cardiomyopathy, dilated, nonischemic  NO RECURRENT VENTRICULAR ARRHYTHMIAS  NO CARDIO EMBOLISM    Essential hypertension  WELL CONTROLLED BP    1- DC  AMIODARONE    2- CONTINUE BBS. AND ACE    3- RETURN IN 3 MONTHS WITH 48 HRS HOLTER

## 2020-11-02 DIAGNOSIS — I49.3 FREQUENT PVCS: ICD-10-CM

## 2020-11-02 RX ORDER — CARVEDILOL 25 MG/1
25 TABLET ORAL 2 TIMES DAILY WITH MEALS
Qty: 180 TABLET | Refills: 3 | Status: SHIPPED | OUTPATIENT
Start: 2020-11-02 | End: 2021-10-28 | Stop reason: SDUPTHER

## 2021-01-13 ENCOUNTER — TELEPHONE (OUTPATIENT)
Dept: CARDIOLOGY | Facility: CLINIC | Age: 56
End: 2021-01-13

## 2021-02-01 ENCOUNTER — OFFICE VISIT (OUTPATIENT)
Dept: TRANSPLANT | Facility: CLINIC | Age: 56
End: 2021-02-01
Payer: COMMERCIAL

## 2021-02-01 VITALS
OXYGEN SATURATION: 95 % | SYSTOLIC BLOOD PRESSURE: 135 MMHG | DIASTOLIC BLOOD PRESSURE: 80 MMHG | HEART RATE: 83 BPM | BODY MASS INDEX: 24.95 KG/M2 | WEIGHT: 140.88 LBS

## 2021-02-01 DIAGNOSIS — I50.9 CHF (NYHA CLASS III, ACC/AHA STAGE C): Primary | Chronic | ICD-10-CM

## 2021-02-01 DIAGNOSIS — I50.22 CHRONIC SYSTOLIC HEART FAILURE, ACC/AHA STAGE C: ICD-10-CM

## 2021-02-01 DIAGNOSIS — I42.0 CARDIOMYOPATHY, DILATED, NONISCHEMIC: ICD-10-CM

## 2021-02-01 DIAGNOSIS — I50.22 CHRONIC SYSTOLIC HEART FAILURE: ICD-10-CM

## 2021-02-01 PROCEDURE — 99214 PR OFFICE/OUTPT VISIT, EST, LEVL IV, 30-39 MIN: ICD-10-PCS | Mod: S$GLB,,, | Performed by: NUCLEAR MEDICINE

## 2021-02-01 PROCEDURE — 3075F SYST BP GE 130 - 139MM HG: CPT | Mod: CPTII,S$GLB,, | Performed by: NUCLEAR MEDICINE

## 2021-02-01 PROCEDURE — 3075F PR MOST RECENT SYSTOLIC BLOOD PRESS GE 130-139MM HG: ICD-10-PCS | Mod: CPTII,S$GLB,, | Performed by: NUCLEAR MEDICINE

## 2021-02-01 PROCEDURE — 3079F DIAST BP 80-89 MM HG: CPT | Mod: CPTII,S$GLB,, | Performed by: NUCLEAR MEDICINE

## 2021-02-01 PROCEDURE — 99214 OFFICE O/P EST MOD 30 MIN: CPT | Mod: S$GLB,,, | Performed by: NUCLEAR MEDICINE

## 2021-02-01 PROCEDURE — 3008F PR BODY MASS INDEX (BMI) DOCUMENTED: ICD-10-PCS | Mod: CPTII,S$GLB,, | Performed by: NUCLEAR MEDICINE

## 2021-02-01 PROCEDURE — 3079F PR MOST RECENT DIASTOLIC BLOOD PRESSURE 80-89 MM HG: ICD-10-PCS | Mod: CPTII,S$GLB,, | Performed by: NUCLEAR MEDICINE

## 2021-02-01 PROCEDURE — 99999 PR PBB SHADOW E&M-EST. PATIENT-LVL IV: ICD-10-PCS | Mod: PBBFAC,,, | Performed by: NUCLEAR MEDICINE

## 2021-02-01 PROCEDURE — 99999 PR PBB SHADOW E&M-EST. PATIENT-LVL IV: CPT | Mod: PBBFAC,,, | Performed by: NUCLEAR MEDICINE

## 2021-02-01 PROCEDURE — 3008F BODY MASS INDEX DOCD: CPT | Mod: CPTII,S$GLB,, | Performed by: NUCLEAR MEDICINE

## 2021-02-01 RX ORDER — LISINOPRIL 10 MG/1
10 TABLET ORAL 2 TIMES DAILY
Qty: 60 TABLET | Refills: 3
Start: 2021-02-01 | End: 2021-09-18

## 2021-02-01 RX ORDER — FUROSEMIDE 20 MG/1
20 TABLET ORAL
Qty: 15 TABLET | Refills: 0
Start: 2021-02-01 | End: 2021-07-02 | Stop reason: SDUPTHER

## 2021-03-31 ENCOUNTER — TELEPHONE (OUTPATIENT)
Dept: CARDIOLOGY | Facility: HOSPITAL | Age: 56
End: 2021-03-31

## 2021-05-14 DIAGNOSIS — I49.3 PVC (PREMATURE VENTRICULAR CONTRACTION): ICD-10-CM

## 2021-05-14 DIAGNOSIS — I50.9 CHF (NYHA CLASS III, ACC/AHA STAGE C): ICD-10-CM

## 2021-05-14 DIAGNOSIS — I42.0 CARDIOMYOPATHY, DILATED, NONISCHEMIC: ICD-10-CM

## 2021-05-14 DIAGNOSIS — I50.22 CHRONIC SYSTOLIC HEART FAILURE, ACC/AHA STAGE C: Primary | ICD-10-CM

## 2021-05-26 ENCOUNTER — LAB VISIT (OUTPATIENT)
Dept: LAB | Facility: HOSPITAL | Age: 56
End: 2021-05-26
Attending: NUCLEAR MEDICINE
Payer: COMMERCIAL

## 2021-05-26 DIAGNOSIS — I50.22 CHRONIC SYSTOLIC HEART FAILURE, ACC/AHA STAGE C: ICD-10-CM

## 2021-05-26 DIAGNOSIS — I42.0 CARDIOMYOPATHY, DILATED, NONISCHEMIC: ICD-10-CM

## 2021-05-26 DIAGNOSIS — I49.3 PVC (PREMATURE VENTRICULAR CONTRACTION): ICD-10-CM

## 2021-05-26 DIAGNOSIS — I50.9 CHF (NYHA CLASS III, ACC/AHA STAGE C): Chronic | ICD-10-CM

## 2021-05-26 LAB
ALBUMIN SERPL BCP-MCNC: 3.6 G/DL (ref 3.5–5.2)
ALP SERPL-CCNC: 92 U/L (ref 55–135)
ALT SERPL W/O P-5'-P-CCNC: 12 U/L (ref 10–44)
ANION GAP SERPL CALC-SCNC: 8 MMOL/L (ref 8–16)
AST SERPL-CCNC: 16 U/L (ref 10–40)
BILIRUB SERPL-MCNC: 0.8 MG/DL (ref 0.1–1)
BUN SERPL-MCNC: 11 MG/DL (ref 6–20)
CALCIUM SERPL-MCNC: 9.5 MG/DL (ref 8.7–10.5)
CHLORIDE SERPL-SCNC: 106 MMOL/L (ref 95–110)
CHOLEST SERPL-MCNC: 153 MG/DL (ref 120–199)
CHOLEST/HDLC SERPL: 2.9 {RATIO} (ref 2–5)
CO2 SERPL-SCNC: 28 MMOL/L (ref 23–29)
CREAT SERPL-MCNC: 0.7 MG/DL (ref 0.5–1.4)
EST. GFR  (AFRICAN AMERICAN): >60 ML/MIN/1.73 M^2
EST. GFR  (NON AFRICAN AMERICAN): >60 ML/MIN/1.73 M^2
GLUCOSE SERPL-MCNC: 89 MG/DL (ref 70–110)
HDLC SERPL-MCNC: 52 MG/DL (ref 40–75)
HDLC SERPL: 34 % (ref 20–50)
LDLC SERPL CALC-MCNC: 88.4 MG/DL (ref 63–159)
NONHDLC SERPL-MCNC: 101 MG/DL
POTASSIUM SERPL-SCNC: 4.4 MMOL/L (ref 3.5–5.1)
PROT SERPL-MCNC: 6.9 G/DL (ref 6–8.4)
SODIUM SERPL-SCNC: 142 MMOL/L (ref 136–145)
TRIGL SERPL-MCNC: 63 MG/DL (ref 30–150)

## 2021-05-26 PROCEDURE — 36415 COLL VENOUS BLD VENIPUNCTURE: CPT | Performed by: NUCLEAR MEDICINE

## 2021-05-26 PROCEDURE — 80061 LIPID PANEL: CPT | Performed by: NUCLEAR MEDICINE

## 2021-05-26 PROCEDURE — 80053 COMPREHEN METABOLIC PANEL: CPT | Performed by: NUCLEAR MEDICINE

## 2021-06-02 ENCOUNTER — HOSPITAL ENCOUNTER (OUTPATIENT)
Dept: CARDIOLOGY | Facility: HOSPITAL | Age: 56
Discharge: HOME | End: 2021-06-02
Attending: NUCLEAR MEDICINE
Payer: COMMERCIAL

## 2021-06-02 ENCOUNTER — OFFICE VISIT (OUTPATIENT)
Dept: TRANSPLANT | Facility: CLINIC | Age: 56
End: 2021-06-02
Payer: COMMERCIAL

## 2021-06-02 VITALS
DIASTOLIC BLOOD PRESSURE: 90 MMHG | HEIGHT: 63 IN | SYSTOLIC BLOOD PRESSURE: 145 MMHG | BODY MASS INDEX: 24.06 KG/M2 | WEIGHT: 135.81 LBS | OXYGEN SATURATION: 97 % | HEART RATE: 84 BPM

## 2021-06-02 DIAGNOSIS — I50.22 CHRONIC SYSTOLIC HEART FAILURE, ACC/AHA STAGE C: ICD-10-CM

## 2021-06-02 DIAGNOSIS — I42.0 CARDIOMYOPATHY, DILATED, NONISCHEMIC: ICD-10-CM

## 2021-06-02 DIAGNOSIS — I10 ESSENTIAL HYPERTENSION: ICD-10-CM

## 2021-06-02 DIAGNOSIS — I50.9 CHF (NYHA CLASS III, ACC/AHA STAGE C): ICD-10-CM

## 2021-06-02 DIAGNOSIS — I49.3 PVC (PREMATURE VENTRICULAR CONTRACTION): ICD-10-CM

## 2021-06-02 DIAGNOSIS — I50.22 CHRONIC SYSTOLIC HEART FAILURE, ACC/AHA STAGE C: Primary | ICD-10-CM

## 2021-06-02 PROBLEM — R79.89 ELEVATED TROPONIN: Status: RESOLVED | Noted: 2020-05-27 | Resolved: 2021-06-02

## 2021-06-02 PROCEDURE — 99999 PR PBB SHADOW E&M-EST. PATIENT-LVL IV: CPT | Mod: PBBFAC,,, | Performed by: NUCLEAR MEDICINE

## 2021-06-02 PROCEDURE — 3008F PR BODY MASS INDEX (BMI) DOCUMENTED: ICD-10-PCS | Mod: CPTII,S$GLB,, | Performed by: NUCLEAR MEDICINE

## 2021-06-02 PROCEDURE — 93010 EKG 12-LEAD: ICD-10-PCS | Mod: ,,, | Performed by: INTERNAL MEDICINE

## 2021-06-02 PROCEDURE — 93010 ELECTROCARDIOGRAM REPORT: CPT | Mod: ,,, | Performed by: INTERNAL MEDICINE

## 2021-06-02 PROCEDURE — 99214 PR OFFICE/OUTPT VISIT, EST, LEVL IV, 30-39 MIN: ICD-10-PCS | Mod: S$GLB,,, | Performed by: NUCLEAR MEDICINE

## 2021-06-02 PROCEDURE — 93005 ELECTROCARDIOGRAM TRACING: CPT

## 2021-06-02 PROCEDURE — 3008F BODY MASS INDEX DOCD: CPT | Mod: CPTII,S$GLB,, | Performed by: NUCLEAR MEDICINE

## 2021-06-02 PROCEDURE — 99214 OFFICE O/P EST MOD 30 MIN: CPT | Mod: S$GLB,,, | Performed by: NUCLEAR MEDICINE

## 2021-06-02 PROCEDURE — 99999 PR PBB SHADOW E&M-EST. PATIENT-LVL IV: ICD-10-PCS | Mod: PBBFAC,,, | Performed by: NUCLEAR MEDICINE

## 2021-07-02 ENCOUNTER — OFFICE VISIT (OUTPATIENT)
Dept: URGENT CARE | Facility: CLINIC | Age: 56
End: 2021-07-02
Payer: COMMERCIAL

## 2021-07-02 ENCOUNTER — HOSPITAL ENCOUNTER (OUTPATIENT)
Dept: RADIOLOGY | Facility: HOSPITAL | Age: 56
Discharge: HOME | End: 2021-07-02
Attending: PHYSICIAN ASSISTANT
Payer: COMMERCIAL

## 2021-07-02 VITALS
BODY MASS INDEX: 23.6 KG/M2 | DIASTOLIC BLOOD PRESSURE: 94 MMHG | TEMPERATURE: 98 F | SYSTOLIC BLOOD PRESSURE: 159 MMHG | HEART RATE: 93 BPM | OXYGEN SATURATION: 93 % | WEIGHT: 133.19 LBS | RESPIRATION RATE: 18 BRPM | HEIGHT: 63 IN

## 2021-07-02 DIAGNOSIS — I50.9 CHF (NYHA CLASS III, ACC/AHA STAGE C): Chronic | ICD-10-CM

## 2021-07-02 DIAGNOSIS — R06.02 SOB (SHORTNESS OF BREATH): ICD-10-CM

## 2021-07-02 DIAGNOSIS — I50.9 ACUTE CONGESTIVE HEART FAILURE, UNSPECIFIED HEART FAILURE TYPE: Primary | ICD-10-CM

## 2021-07-02 LAB
CTP QC/QA: YES
SARS-COV-2 RDRP RESP QL NAA+PROBE: NEGATIVE

## 2021-07-02 PROCEDURE — 3008F BODY MASS INDEX DOCD: CPT | Mod: CPTII,S$GLB,, | Performed by: PHYSICIAN ASSISTANT

## 2021-07-02 PROCEDURE — 1126F AMNT PAIN NOTED NONE PRSNT: CPT | Mod: S$GLB,,, | Performed by: PHYSICIAN ASSISTANT

## 2021-07-02 PROCEDURE — U0002 COVID-19 LAB TEST NON-CDC: HCPCS | Mod: QW,S$GLB,, | Performed by: PHYSICIAN ASSISTANT

## 2021-07-02 PROCEDURE — 94640 PR INHAL RX, AIRWAY OBST/DX SPUTUM INDUCT: ICD-10-PCS | Mod: S$GLB,,, | Performed by: PHYSICIAN ASSISTANT

## 2021-07-02 PROCEDURE — 1126F PR PAIN SEVERITY QUANTIFIED, NO PAIN PRESENT: ICD-10-PCS | Mod: S$GLB,,, | Performed by: PHYSICIAN ASSISTANT

## 2021-07-02 PROCEDURE — 99999 PR PBB SHADOW E&M-EST. PATIENT-LVL IV: CPT | Mod: PBBFAC,,, | Performed by: PHYSICIAN ASSISTANT

## 2021-07-02 PROCEDURE — 71046 X-RAY EXAM CHEST 2 VIEWS: CPT | Mod: 26,,, | Performed by: RADIOLOGY

## 2021-07-02 PROCEDURE — 99214 PR OFFICE/OUTPT VISIT, EST, LEVL IV, 30-39 MIN: ICD-10-PCS | Mod: 25,S$GLB,, | Performed by: PHYSICIAN ASSISTANT

## 2021-07-02 PROCEDURE — 99214 OFFICE O/P EST MOD 30 MIN: CPT | Mod: 25,S$GLB,, | Performed by: PHYSICIAN ASSISTANT

## 2021-07-02 PROCEDURE — 94640 AIRWAY INHALATION TREATMENT: CPT | Mod: S$GLB,,, | Performed by: PHYSICIAN ASSISTANT

## 2021-07-02 PROCEDURE — 3008F PR BODY MASS INDEX (BMI) DOCUMENTED: ICD-10-PCS | Mod: CPTII,S$GLB,, | Performed by: PHYSICIAN ASSISTANT

## 2021-07-02 PROCEDURE — 71046 XR CHEST PA AND LATERAL: ICD-10-PCS | Mod: 26,,, | Performed by: RADIOLOGY

## 2021-07-02 PROCEDURE — U0002: ICD-10-PCS | Mod: QW,S$GLB,, | Performed by: PHYSICIAN ASSISTANT

## 2021-07-02 PROCEDURE — 99999 PR PBB SHADOW E&M-EST. PATIENT-LVL IV: ICD-10-PCS | Mod: PBBFAC,,, | Performed by: PHYSICIAN ASSISTANT

## 2021-07-02 PROCEDURE — 71046 X-RAY EXAM CHEST 2 VIEWS: CPT | Mod: TC,PO

## 2021-07-02 RX ORDER — FUROSEMIDE 20 MG/1
20 TABLET ORAL 2 TIMES DAILY
Qty: 60 TABLET | Refills: 0 | Status: SHIPPED | OUTPATIENT
Start: 2021-07-02 | End: 2021-10-28 | Stop reason: SDUPTHER

## 2021-07-02 RX ORDER — FUROSEMIDE 20 MG/1
20 TABLET ORAL
Qty: 15 TABLET | Refills: 0
Start: 2021-07-02 | End: 2021-07-02

## 2021-07-02 RX ORDER — LEVALBUTEROL INHALATION SOLUTION 1.25 MG/3ML
1.25 SOLUTION RESPIRATORY (INHALATION)
Status: COMPLETED | OUTPATIENT
Start: 2021-07-02 | End: 2021-07-02

## 2021-07-02 RX ADMIN — LEVALBUTEROL INHALATION SOLUTION 1.25 MG: 1.25 SOLUTION RESPIRATORY (INHALATION) at 01:07

## 2021-09-25 ENCOUNTER — OFFICE VISIT (OUTPATIENT)
Dept: URGENT CARE | Facility: CLINIC | Age: 56
End: 2021-09-25
Payer: COMMERCIAL

## 2021-09-25 ENCOUNTER — HOSPITAL ENCOUNTER (EMERGENCY)
Facility: HOSPITAL | Age: 56
Discharge: HOME | End: 2021-09-25
Attending: EMERGENCY MEDICINE
Payer: COMMERCIAL

## 2021-09-25 VITALS
RESPIRATION RATE: 20 BRPM | WEIGHT: 128.5 LBS | TEMPERATURE: 99 F | HEART RATE: 75 BPM | SYSTOLIC BLOOD PRESSURE: 115 MMHG | DIASTOLIC BLOOD PRESSURE: 78 MMHG | HEIGHT: 64 IN | OXYGEN SATURATION: 93 % | BODY MASS INDEX: 21.94 KG/M2

## 2021-09-25 VITALS
SYSTOLIC BLOOD PRESSURE: 119 MMHG | HEIGHT: 64 IN | WEIGHT: 135 LBS | DIASTOLIC BLOOD PRESSURE: 82 MMHG | TEMPERATURE: 99 F | OXYGEN SATURATION: 91 % | HEART RATE: 90 BPM | BODY MASS INDEX: 23.05 KG/M2

## 2021-09-25 DIAGNOSIS — R05.9 COUGH: ICD-10-CM

## 2021-09-25 DIAGNOSIS — R09.02 HYPOXIA: ICD-10-CM

## 2021-09-25 DIAGNOSIS — U07.1 COVID-19 VIRUS INFECTION: Primary | ICD-10-CM

## 2021-09-25 DIAGNOSIS — L03.113 RIGHT ARM CELLULITIS: ICD-10-CM

## 2021-09-25 LAB
ALBUMIN SERPL BCP-MCNC: 3.8 G/DL (ref 3.5–5.2)
ALP SERPL-CCNC: 88 U/L (ref 55–135)
ALT SERPL W/O P-5'-P-CCNC: 15 U/L (ref 10–44)
ANION GAP SERPL CALC-SCNC: 12 MMOL/L (ref 8–16)
ANISOCYTOSIS BLD QL SMEAR: SLIGHT
AST SERPL-CCNC: 20 U/L (ref 10–40)
BASOPHILS # BLD AUTO: 0.02 K/UL (ref 0–0.2)
BASOPHILS NFR BLD: 0.6 % (ref 0–1.9)
BILIRUB SERPL-MCNC: 0.5 MG/DL (ref 0.1–1)
BNP SERPL-MCNC: 242 PG/ML (ref 0–99)
BUN SERPL-MCNC: 19 MG/DL (ref 6–20)
CALCIUM SERPL-MCNC: 9.7 MG/DL (ref 8.7–10.5)
CHLORIDE SERPL-SCNC: 107 MMOL/L (ref 95–110)
CO2 SERPL-SCNC: 23 MMOL/L (ref 23–29)
CREAT SERPL-MCNC: 0.7 MG/DL (ref 0.5–1.4)
CTP QC/QA: YES
DACRYOCYTES BLD QL SMEAR: ABNORMAL
DIFFERENTIAL METHOD: ABNORMAL
EOSINOPHIL # BLD AUTO: 0.1 K/UL (ref 0–0.5)
EOSINOPHIL NFR BLD: 4 % (ref 0–8)
ERYTHROCYTE [DISTWIDTH] IN BLOOD BY AUTOMATED COUNT: 12.6 % (ref 11.5–14.5)
EST. GFR  (AFRICAN AMERICAN): >60 ML/MIN/1.73 M^2
EST. GFR  (NON AFRICAN AMERICAN): >60 ML/MIN/1.73 M^2
GLUCOSE SERPL-MCNC: 75 MG/DL (ref 70–110)
HCT VFR BLD AUTO: 53.5 % (ref 37–48.5)
HGB BLD-MCNC: 17.6 G/DL (ref 12–16)
IMM GRANULOCYTES # BLD AUTO: 0.01 K/UL (ref 0–0.04)
IMM GRANULOCYTES NFR BLD AUTO: 0.3 % (ref 0–0.5)
LYMPHOCYTES # BLD AUTO: 1.9 K/UL (ref 1–4.8)
LYMPHOCYTES NFR BLD: 53.4 % (ref 18–48)
MCH RBC QN AUTO: 32.5 PG (ref 27–31)
MCHC RBC AUTO-ENTMCNC: 32.9 G/DL (ref 32–36)
MCV RBC AUTO: 99 FL (ref 82–98)
MONOCYTES # BLD AUTO: 0.4 K/UL (ref 0.3–1)
MONOCYTES NFR BLD: 11.1 % (ref 4–15)
NEUTROPHILS # BLD AUTO: 1.1 K/UL (ref 1.8–7.7)
NEUTROPHILS NFR BLD: 30.6 % (ref 38–73)
NRBC BLD-RTO: 0 /100 WBC
OVALOCYTES BLD QL SMEAR: ABNORMAL
PLATELET # BLD AUTO: 129 K/UL (ref 150–450)
PMV BLD AUTO: 10.2 FL (ref 9.2–12.9)
POIKILOCYTOSIS BLD QL SMEAR: SLIGHT
POTASSIUM SERPL-SCNC: 4.2 MMOL/L (ref 3.5–5.1)
PROT SERPL-MCNC: 7 G/DL (ref 6–8.4)
RBC # BLD AUTO: 5.41 M/UL (ref 4–5.4)
SARS-COV-2 RDRP RESP QL NAA+PROBE: POSITIVE
SODIUM SERPL-SCNC: 142 MMOL/L (ref 136–145)
WBC # BLD AUTO: 3.52 K/UL (ref 3.9–12.7)

## 2021-09-25 PROCEDURE — 99999 PR PBB SHADOW E&M-EST. PATIENT-LVL III: CPT | Mod: PBBFAC,,, | Performed by: PHYSICIAN ASSISTANT

## 2021-09-25 PROCEDURE — U0002 COVID-19 LAB TEST NON-CDC: HCPCS | Mod: QW,S$GLB,, | Performed by: PHYSICIAN ASSISTANT

## 2021-09-25 PROCEDURE — 1160F PR REVIEW ALL MEDS BY PRESCRIBER/CLIN PHARMACIST DOCUMENTED: ICD-10-PCS | Mod: CPTII,S$GLB,, | Performed by: PHYSICIAN ASSISTANT

## 2021-09-25 PROCEDURE — 3079F PR MOST RECENT DIASTOLIC BLOOD PRESSURE 80-89 MM HG: ICD-10-PCS | Mod: CPTII,S$GLB,, | Performed by: PHYSICIAN ASSISTANT

## 2021-09-25 PROCEDURE — 4010F PR ACE/ARB THEARPY RXD/TAKEN: ICD-10-PCS | Mod: CPTII,S$GLB,, | Performed by: PHYSICIAN ASSISTANT

## 2021-09-25 PROCEDURE — 3079F DIAST BP 80-89 MM HG: CPT | Mod: CPTII,S$GLB,, | Performed by: PHYSICIAN ASSISTANT

## 2021-09-25 PROCEDURE — 1159F PR MEDICATION LIST DOCUMENTED IN MEDICAL RECORD: ICD-10-PCS | Mod: CPTII,S$GLB,, | Performed by: PHYSICIAN ASSISTANT

## 2021-09-25 PROCEDURE — 1159F MED LIST DOCD IN RCRD: CPT | Mod: CPTII,S$GLB,, | Performed by: PHYSICIAN ASSISTANT

## 2021-09-25 PROCEDURE — 85025 COMPLETE CBC W/AUTO DIFF WBC: CPT | Performed by: EMERGENCY MEDICINE

## 2021-09-25 PROCEDURE — 3008F PR BODY MASS INDEX (BMI) DOCUMENTED: ICD-10-PCS | Mod: CPTII,S$GLB,, | Performed by: PHYSICIAN ASSISTANT

## 2021-09-25 PROCEDURE — 99214 PR OFFICE/OUTPT VISIT, EST, LEVL IV, 30-39 MIN: ICD-10-PCS | Mod: S$GLB,,, | Performed by: PHYSICIAN ASSISTANT

## 2021-09-25 PROCEDURE — 3074F SYST BP LT 130 MM HG: CPT | Mod: CPTII,S$GLB,, | Performed by: PHYSICIAN ASSISTANT

## 2021-09-25 PROCEDURE — 1160F RVW MEDS BY RX/DR IN RCRD: CPT | Mod: CPTII,S$GLB,, | Performed by: PHYSICIAN ASSISTANT

## 2021-09-25 PROCEDURE — 83880 ASSAY OF NATRIURETIC PEPTIDE: CPT | Performed by: EMERGENCY MEDICINE

## 2021-09-25 PROCEDURE — 99214 OFFICE O/P EST MOD 30 MIN: CPT | Mod: S$GLB,,, | Performed by: PHYSICIAN ASSISTANT

## 2021-09-25 PROCEDURE — 80053 COMPREHEN METABOLIC PANEL: CPT | Performed by: EMERGENCY MEDICINE

## 2021-09-25 PROCEDURE — 4010F ACE/ARB THERAPY RXD/TAKEN: CPT | Mod: CPTII,S$GLB,, | Performed by: PHYSICIAN ASSISTANT

## 2021-09-25 PROCEDURE — U0002: ICD-10-PCS | Mod: QW,S$GLB,, | Performed by: PHYSICIAN ASSISTANT

## 2021-09-25 PROCEDURE — 3074F PR MOST RECENT SYSTOLIC BLOOD PRESSURE < 130 MM HG: ICD-10-PCS | Mod: CPTII,S$GLB,, | Performed by: PHYSICIAN ASSISTANT

## 2021-09-25 PROCEDURE — 99284 EMERGENCY DEPT VISIT MOD MDM: CPT | Mod: 25

## 2021-09-25 PROCEDURE — 99999 PR PBB SHADOW E&M-EST. PATIENT-LVL III: ICD-10-PCS | Mod: PBBFAC,,, | Performed by: PHYSICIAN ASSISTANT

## 2021-09-25 PROCEDURE — 3008F BODY MASS INDEX DOCD: CPT | Mod: CPTII,S$GLB,, | Performed by: PHYSICIAN ASSISTANT

## 2021-09-25 RX ORDER — NAPROXEN 500 MG/1
1 TABLET ORAL 2 TIMES DAILY
COMMUNITY
Start: 2017-05-16

## 2021-09-25 RX ORDER — CLINDAMYCIN HYDROCHLORIDE 150 MG/1
150 CAPSULE ORAL 4 TIMES DAILY
Qty: 56 CAPSULE | Refills: 0 | Status: SHIPPED | OUTPATIENT
Start: 2021-09-25 | End: 2021-10-02

## 2021-09-27 ENCOUNTER — INFUSION (OUTPATIENT)
Dept: INFECTIOUS DISEASES | Facility: HOSPITAL | Age: 56
End: 2021-09-27
Attending: EMERGENCY MEDICINE
Payer: COMMERCIAL

## 2021-09-27 VITALS
DIASTOLIC BLOOD PRESSURE: 71 MMHG | TEMPERATURE: 98 F | OXYGEN SATURATION: 95 % | HEART RATE: 70 BPM | SYSTOLIC BLOOD PRESSURE: 108 MMHG | RESPIRATION RATE: 20 BRPM

## 2021-09-27 DIAGNOSIS — U07.1 COVID-19 VIRUS INFECTION: ICD-10-CM

## 2021-09-27 PROCEDURE — 25000003 PHARM REV CODE 250: Performed by: INTERNAL MEDICINE

## 2021-09-27 PROCEDURE — 63600175 PHARM REV CODE 636 W HCPCS: Performed by: INTERNAL MEDICINE

## 2021-09-27 PROCEDURE — M0243 CASIRIVI AND IMDEVI INFUSION: HCPCS | Performed by: INTERNAL MEDICINE

## 2021-09-27 RX ORDER — ONDANSETRON 4 MG/1
4 TABLET, ORALLY DISINTEGRATING ORAL ONCE AS NEEDED
Status: ACTIVE | OUTPATIENT
Start: 2021-09-27 | End: 2033-02-23

## 2021-09-27 RX ORDER — ALBUTEROL SULFATE 90 UG/1
2 AEROSOL, METERED RESPIRATORY (INHALATION)
Status: ACTIVE | OUTPATIENT
Start: 2021-09-27

## 2021-09-27 RX ORDER — SODIUM CHLORIDE 0.9 % (FLUSH) 0.9 %
10 SYRINGE (ML) INJECTION
Status: ACTIVE | OUTPATIENT
Start: 2021-09-27

## 2021-09-27 RX ORDER — EPINEPHRINE 0.3 MG/.3ML
0.3 INJECTION SUBCUTANEOUS
Status: ACTIVE | OUTPATIENT
Start: 2021-09-27

## 2021-09-27 RX ORDER — DIPHENHYDRAMINE HYDROCHLORIDE 50 MG/ML
25 INJECTION INTRAMUSCULAR; INTRAVENOUS ONCE AS NEEDED
Status: ACTIVE | OUTPATIENT
Start: 2021-09-27 | End: 2033-02-23

## 2021-09-27 RX ORDER — ACETAMINOPHEN 325 MG/1
650 TABLET ORAL ONCE AS NEEDED
Status: ACTIVE | OUTPATIENT
Start: 2021-09-27 | End: 2033-02-23

## 2021-09-27 RX ADMIN — CASIRIVIMAB AND IMDEVIMAB 600 MG: 600; 600 INJECTION, SOLUTION, CONCENTRATE INTRAVENOUS at 11:09

## 2021-10-06 ENCOUNTER — OFFICE VISIT (OUTPATIENT)
Dept: TRANSPLANT | Facility: CLINIC | Age: 56
End: 2021-10-06
Payer: COMMERCIAL

## 2021-10-06 VITALS
OXYGEN SATURATION: 96 % | DIASTOLIC BLOOD PRESSURE: 70 MMHG | BODY MASS INDEX: 22.13 KG/M2 | HEART RATE: 74 BPM | HEIGHT: 64 IN | WEIGHT: 129.63 LBS | SYSTOLIC BLOOD PRESSURE: 140 MMHG

## 2021-10-06 DIAGNOSIS — I50.22 CHRONIC SYSTOLIC HEART FAILURE, ACC/AHA STAGE C: Primary | ICD-10-CM

## 2021-10-06 DIAGNOSIS — I10 ESSENTIAL HYPERTENSION: ICD-10-CM

## 2021-10-06 DIAGNOSIS — I42.0 CARDIOMYOPATHY, DILATED, NONISCHEMIC: ICD-10-CM

## 2021-10-06 PROCEDURE — 1159F MED LIST DOCD IN RCRD: CPT | Mod: CPTII,S$GLB,, | Performed by: NUCLEAR MEDICINE

## 2021-10-06 PROCEDURE — 4010F ACE/ARB THERAPY RXD/TAKEN: CPT | Mod: CPTII,S$GLB,, | Performed by: NUCLEAR MEDICINE

## 2021-10-06 PROCEDURE — 99214 PR OFFICE/OUTPT VISIT, EST, LEVL IV, 30-39 MIN: ICD-10-PCS | Mod: S$GLB,,, | Performed by: NUCLEAR MEDICINE

## 2021-10-06 PROCEDURE — 99214 OFFICE O/P EST MOD 30 MIN: CPT | Mod: S$GLB,,, | Performed by: NUCLEAR MEDICINE

## 2021-10-06 PROCEDURE — 3077F SYST BP >= 140 MM HG: CPT | Mod: CPTII,S$GLB,, | Performed by: NUCLEAR MEDICINE

## 2021-10-06 PROCEDURE — 99999 PR PBB SHADOW E&M-EST. PATIENT-LVL IV: CPT | Mod: PBBFAC,,, | Performed by: NUCLEAR MEDICINE

## 2021-10-06 PROCEDURE — 4010F PR ACE/ARB THEARPY RXD/TAKEN: ICD-10-PCS | Mod: CPTII,S$GLB,, | Performed by: NUCLEAR MEDICINE

## 2021-10-06 PROCEDURE — 1159F PR MEDICATION LIST DOCUMENTED IN MEDICAL RECORD: ICD-10-PCS | Mod: CPTII,S$GLB,, | Performed by: NUCLEAR MEDICINE

## 2021-10-06 PROCEDURE — 1160F RVW MEDS BY RX/DR IN RCRD: CPT | Mod: CPTII,S$GLB,, | Performed by: NUCLEAR MEDICINE

## 2021-10-06 PROCEDURE — 1160F PR REVIEW ALL MEDS BY PRESCRIBER/CLIN PHARMACIST DOCUMENTED: ICD-10-PCS | Mod: CPTII,S$GLB,, | Performed by: NUCLEAR MEDICINE

## 2021-10-06 PROCEDURE — 3078F DIAST BP <80 MM HG: CPT | Mod: CPTII,S$GLB,, | Performed by: NUCLEAR MEDICINE

## 2021-10-06 PROCEDURE — 99999 PR PBB SHADOW E&M-EST. PATIENT-LVL IV: ICD-10-PCS | Mod: PBBFAC,,, | Performed by: NUCLEAR MEDICINE

## 2021-10-06 PROCEDURE — 3008F BODY MASS INDEX DOCD: CPT | Mod: CPTII,S$GLB,, | Performed by: NUCLEAR MEDICINE

## 2021-10-06 PROCEDURE — 3008F PR BODY MASS INDEX (BMI) DOCUMENTED: ICD-10-PCS | Mod: CPTII,S$GLB,, | Performed by: NUCLEAR MEDICINE

## 2021-10-06 PROCEDURE — 3077F PR MOST RECENT SYSTOLIC BLOOD PRESSURE >= 140 MM HG: ICD-10-PCS | Mod: CPTII,S$GLB,, | Performed by: NUCLEAR MEDICINE

## 2021-10-06 PROCEDURE — 3078F PR MOST RECENT DIASTOLIC BLOOD PRESSURE < 80 MM HG: ICD-10-PCS | Mod: CPTII,S$GLB,, | Performed by: NUCLEAR MEDICINE

## 2021-10-28 DIAGNOSIS — I50.9 ACUTE CONGESTIVE HEART FAILURE, UNSPECIFIED HEART FAILURE TYPE: ICD-10-CM

## 2021-10-28 DIAGNOSIS — I50.22 CHRONIC SYSTOLIC HEART FAILURE: ICD-10-CM

## 2021-10-28 DIAGNOSIS — I49.3 FREQUENT PVCS: ICD-10-CM

## 2021-10-28 DIAGNOSIS — I50.9 CHF (NYHA CLASS III, ACC/AHA STAGE C): Chronic | ICD-10-CM

## 2021-10-28 RX ORDER — LISINOPRIL 10 MG/1
10 TABLET ORAL 2 TIMES DAILY
Qty: 180 TABLET | Refills: 3 | Status: SHIPPED | OUTPATIENT
Start: 2021-10-28 | End: 2022-11-22

## 2021-10-28 RX ORDER — FUROSEMIDE 20 MG/1
20 TABLET ORAL 2 TIMES DAILY PRN
Qty: 60 TABLET | Refills: 3 | Status: SHIPPED | OUTPATIENT
Start: 2021-10-28 | End: 2022-01-26 | Stop reason: SDUPTHER

## 2021-10-28 RX ORDER — CARVEDILOL 25 MG/1
25 TABLET ORAL 2 TIMES DAILY WITH MEALS
Qty: 180 TABLET | Refills: 3 | Status: SHIPPED | OUTPATIENT
Start: 2021-10-28 | End: 2022-11-22

## 2021-12-07 ENCOUNTER — TELEPHONE (OUTPATIENT)
Dept: CARDIOLOGY | Facility: CLINIC | Age: 56
End: 2021-12-07
Payer: COMMERCIAL

## 2021-12-14 ENCOUNTER — TELEPHONE (OUTPATIENT)
Dept: TRANSPLANT | Facility: CLINIC | Age: 56
End: 2021-12-14
Payer: COMMERCIAL

## 2021-12-16 ENCOUNTER — TELEPHONE (OUTPATIENT)
Dept: CARDIOLOGY | Facility: CLINIC | Age: 56
End: 2021-12-16
Payer: COMMERCIAL

## 2022-01-26 DIAGNOSIS — I50.9 ACUTE CONGESTIVE HEART FAILURE, UNSPECIFIED HEART FAILURE TYPE: ICD-10-CM

## 2022-01-26 DIAGNOSIS — I50.9 CHF (NYHA CLASS III, ACC/AHA STAGE C): Chronic | ICD-10-CM

## 2022-01-26 RX ORDER — FUROSEMIDE 20 MG/1
20 TABLET ORAL 2 TIMES DAILY PRN
Qty: 60 TABLET | Refills: 3 | Status: SHIPPED | OUTPATIENT
Start: 2022-01-26 | End: 2022-11-30

## 2022-03-03 ENCOUNTER — LAB VISIT (OUTPATIENT)
Dept: LAB | Facility: HOSPITAL | Age: 57
End: 2022-03-03
Attending: INTERNAL MEDICINE
Payer: COMMERCIAL

## 2022-03-03 ENCOUNTER — OFFICE VISIT (OUTPATIENT)
Dept: TRANSPLANT | Facility: CLINIC | Age: 57
End: 2022-03-03
Payer: COMMERCIAL

## 2022-03-03 VITALS
SYSTOLIC BLOOD PRESSURE: 136 MMHG | DIASTOLIC BLOOD PRESSURE: 80 MMHG | OXYGEN SATURATION: 95 % | HEIGHT: 63 IN | WEIGHT: 129.63 LBS | BODY MASS INDEX: 22.97 KG/M2 | HEART RATE: 90 BPM

## 2022-03-03 DIAGNOSIS — I50.22 CHRONIC SYSTOLIC HEART FAILURE, ACC/AHA STAGE C: ICD-10-CM

## 2022-03-03 DIAGNOSIS — I50.9 CHF (NYHA CLASS III, ACC/AHA STAGE C): Chronic | ICD-10-CM

## 2022-03-03 DIAGNOSIS — I50.42 CHRONIC COMBINED SYSTOLIC AND DIASTOLIC HEART FAILURE: Primary | ICD-10-CM

## 2022-03-03 DIAGNOSIS — F17.200 CURRENT SMOKER: ICD-10-CM

## 2022-03-03 DIAGNOSIS — I49.3 FREQUENT PVCS: ICD-10-CM

## 2022-03-03 DIAGNOSIS — I50.22 CHRONIC SYSTOLIC HEART FAILURE: ICD-10-CM

## 2022-03-03 DIAGNOSIS — I42.8 NICM (NONISCHEMIC CARDIOMYOPATHY): ICD-10-CM

## 2022-03-03 DIAGNOSIS — I10 ESSENTIAL HYPERTENSION: ICD-10-CM

## 2022-03-03 DIAGNOSIS — J44.9 CHRONIC OBSTRUCTIVE PULMONARY DISEASE, UNSPECIFIED COPD TYPE: ICD-10-CM

## 2022-03-03 LAB
ANION GAP SERPL CALC-SCNC: 11 MMOL/L (ref 8–16)
BNP SERPL-MCNC: 924 PG/ML (ref 0–99)
BUN SERPL-MCNC: 13 MG/DL (ref 6–20)
CALCIUM SERPL-MCNC: 9.4 MG/DL (ref 8.7–10.5)
CHLORIDE SERPL-SCNC: 106 MMOL/L (ref 95–110)
CO2 SERPL-SCNC: 25 MMOL/L (ref 23–29)
CREAT SERPL-MCNC: 0.7 MG/DL (ref 0.5–1.4)
EST. GFR  (AFRICAN AMERICAN): >60 ML/MIN/1.73 M^2
EST. GFR  (NON AFRICAN AMERICAN): >60 ML/MIN/1.73 M^2
GLUCOSE SERPL-MCNC: 83 MG/DL (ref 70–110)
POTASSIUM SERPL-SCNC: 3.8 MMOL/L (ref 3.5–5.1)
SODIUM SERPL-SCNC: 142 MMOL/L (ref 136–145)

## 2022-03-03 PROCEDURE — 1160F PR REVIEW ALL MEDS BY PRESCRIBER/CLIN PHARMACIST DOCUMENTED: ICD-10-PCS | Mod: CPTII,S$GLB,, | Performed by: NURSE PRACTITIONER

## 2022-03-03 PROCEDURE — 99999 PR PBB SHADOW E&M-EST. PATIENT-LVL V: ICD-10-PCS | Mod: PBBFAC,,, | Performed by: NURSE PRACTITIONER

## 2022-03-03 PROCEDURE — 3079F DIAST BP 80-89 MM HG: CPT | Mod: CPTII,S$GLB,, | Performed by: NURSE PRACTITIONER

## 2022-03-03 PROCEDURE — 1160F RVW MEDS BY RX/DR IN RCRD: CPT | Mod: CPTII,S$GLB,, | Performed by: NURSE PRACTITIONER

## 2022-03-03 PROCEDURE — 1159F PR MEDICATION LIST DOCUMENTED IN MEDICAL RECORD: ICD-10-PCS | Mod: CPTII,S$GLB,, | Performed by: NURSE PRACTITIONER

## 2022-03-03 PROCEDURE — 36415 COLL VENOUS BLD VENIPUNCTURE: CPT | Performed by: NURSE PRACTITIONER

## 2022-03-03 PROCEDURE — 3075F PR MOST RECENT SYSTOLIC BLOOD PRESS GE 130-139MM HG: ICD-10-PCS | Mod: CPTII,S$GLB,, | Performed by: NURSE PRACTITIONER

## 2022-03-03 PROCEDURE — 3079F PR MOST RECENT DIASTOLIC BLOOD PRESSURE 80-89 MM HG: ICD-10-PCS | Mod: CPTII,S$GLB,, | Performed by: NURSE PRACTITIONER

## 2022-03-03 PROCEDURE — 99214 OFFICE O/P EST MOD 30 MIN: CPT | Mod: S$GLB,,, | Performed by: NURSE PRACTITIONER

## 2022-03-03 PROCEDURE — 3075F SYST BP GE 130 - 139MM HG: CPT | Mod: CPTII,S$GLB,, | Performed by: NURSE PRACTITIONER

## 2022-03-03 PROCEDURE — 80048 BASIC METABOLIC PNL TOTAL CA: CPT | Performed by: NURSE PRACTITIONER

## 2022-03-03 PROCEDURE — 3008F PR BODY MASS INDEX (BMI) DOCUMENTED: ICD-10-PCS | Mod: CPTII,S$GLB,, | Performed by: NURSE PRACTITIONER

## 2022-03-03 PROCEDURE — 99999 PR PBB SHADOW E&M-EST. PATIENT-LVL V: CPT | Mod: PBBFAC,,, | Performed by: NURSE PRACTITIONER

## 2022-03-03 PROCEDURE — 99214 PR OFFICE/OUTPT VISIT, EST, LEVL IV, 30-39 MIN: ICD-10-PCS | Mod: S$GLB,,, | Performed by: NURSE PRACTITIONER

## 2022-03-03 PROCEDURE — 3008F BODY MASS INDEX DOCD: CPT | Mod: CPTII,S$GLB,, | Performed by: NURSE PRACTITIONER

## 2022-03-03 PROCEDURE — 4010F PR ACE/ARB THEARPY RXD/TAKEN: ICD-10-PCS | Mod: CPTII,S$GLB,, | Performed by: NURSE PRACTITIONER

## 2022-03-03 PROCEDURE — 83880 ASSAY OF NATRIURETIC PEPTIDE: CPT | Performed by: NUCLEAR MEDICINE

## 2022-03-03 PROCEDURE — 4010F ACE/ARB THERAPY RXD/TAKEN: CPT | Mod: CPTII,S$GLB,, | Performed by: NURSE PRACTITIONER

## 2022-03-03 PROCEDURE — 1159F MED LIST DOCD IN RCRD: CPT | Mod: CPTII,S$GLB,, | Performed by: NURSE PRACTITIONER

## 2022-03-03 NOTE — PROGRESS NOTES
Subjective:   Patient ID:  Melissa Petty is a 57 y.o. female who presents for follow-up of Chronic systolic heart failure, ACC/AHA    Primary Cardiologist - formerly Dr. Welch    Cardiac Problems  1. Chronic combined systolic and diastolic heart failure//HFrEF; stage C; FC 1; EF 45%  2. NICM  3. PVCs  4. Essential HTN  5. COPD  6. Current smoker     HPI   Ms. Petty presents today for routine 4mo follow up of the problems listed above. Overall, the patient is feeling well and offers no cardiac complaints. 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 She has not needed to take her PRN lasix in many months. She has occasional R knee pain/swelling from her arthritis. She is currently smoking 1ppd, not ready to quit at this time.    Patient denies new or worsening chest pain/tightness, SOB, PAINTER, PND, orthopnea, edema, palpitations, lightheadedness, dizziness, fainting, vision changes, weakness, or abnormal bleeding.     Patient states they are compliant with all medications and offers no side effects.     Review of Systems   Constitutional: Negative.   HENT: Negative.    Eyes: Negative.  Negative for blurred vision, double vision, vision loss in left eye, vision loss in right eye, visual disturbance and visual halos.   Cardiovascular: Negative.  Negative for chest pain, claudication, cyanosis, dyspnea on exertion, irregular heartbeat, leg swelling, near-syncope, orthopnea, palpitations and paroxysmal nocturnal dyspnea.   Respiratory: Negative for cough, hemoptysis, shortness of breath, sleep disturbances due to breathing and wheezing.    Endocrine: Negative.    Hematologic/Lymphatic: Negative.  Negative for bleeding problem. Does not bruise/bleed easily.   Skin: Negative.  Negative for nail changes.   Musculoskeletal: Negative.  Negative for falls, muscle cramps, muscle weakness and myalgias.   Gastrointestinal: Negative.  Negative for bloating, abdominal pain,  anorexia, hematemesis, hematochezia, melena, nausea and vomiting.   Genitourinary: Negative.  Negative for dysuria, flank pain and hematuria.   Neurological: Negative.  Negative for brief paralysis, dizziness, focal weakness, headaches, light-headedness, numbness, paresthesias and weakness.   Psychiatric/Behavioral: Negative.    Allergic/Immunologic: Negative.        Objective:   Physical Exam  Vitals and nursing note reviewed.   Constitutional:       General: She is not in acute distress.     Appearance: Normal appearance. She is not ill-appearing.   HENT:      Head: Normocephalic and atraumatic.   Eyes:      Extraocular Movements: Extraocular movements intact.      Pupils: Pupils are equal, round, and reactive to light.   Neck:      Vascular: No carotid bruit.   Cardiovascular:      Rate and Rhythm: Normal rate and regular rhythm.      Pulses: Normal pulses.      Heart sounds: Normal heart sounds. No murmur heard.  Pulmonary:      Effort: Pulmonary effort is normal.      Breath sounds: Normal breath sounds.   Abdominal:      General: Bowel sounds are normal. There is no distension.      Palpations: Abdomen is soft.      Tenderness: There is no abdominal tenderness.   Musculoskeletal:         General: No swelling or tenderness. Normal range of motion.      Cervical back: Normal range of motion and neck supple. No tenderness.      Right lower leg: No edema.      Left lower leg: No edema.   Lymphadenopathy:      Cervical: No cervical adenopathy.   Skin:     General: Skin is warm and dry.      Capillary Refill: Capillary refill takes less than 2 seconds.   Neurological:      General: No focal deficit present.      Mental Status: She is alert and oriented to person, place, and time. Mental status is at baseline.   Psychiatric:         Mood and Affect: Mood normal.         Behavior: Behavior normal.         Thought Content: Thought content normal.         Judgment: Judgment normal.       /80 (BP Location: Left  "arm, Patient Position: Sitting)   Pulse 90   Ht 5' 3" (1.6 m)   Wt 58.8 kg (129 lb 10.1 oz)   SpO2 95%   BMI 22.96 kg/m²     Assessment:     1. Chronic combined systolic and diastolic heart failure    2. NICM (nonischemic cardiomyopathy)    3. Frequent PVCs    4. Essential hypertension    5. Chronic obstructive pulmonary disease, unspecified COPD type    6. Current smoker      Labs/Imaging Data Reviewed:  1. TTE 5/27/2020  Summary  · Concentric left ventricular hypertrophy.  · Mildly decreased left ventricular systolic function. The estimated ejection fraction is 45%.  · Grade I (mild) left ventricular diastolic dysfunction consistent with impaired relaxation.  · Normal right ventricular systolic function.  · Global hypokinetic wall motion.  · Moderate left atrial enlargement.  · Normal central venous pressure (3 mmHg).    Plan:   1. Chronic combined systolic and diastolic heart failure  2. NICM (nonischemic cardiomyopathy)  - stable, well compensated  - continue carvedilol 25mg BID  - continue lasix 20mg PRN   - continue lisinopril 10mg BID  - BMP today  - strict daily weights  - 2L daily (64oz) fluid restriction  - 2000mg daily sodium restriction  - instructed patient to call if weight gain 2-3lbs overnight or 5lbs in one week  - instructed patient to call if they develop worsening shortness of breath, increased leg swelling, increased abdominal bloating, or shortness of breath when you are sleeping     3. Frequent PVCs  - stable  - asymptomatic    4. Essential hypertension  - BP WNL  - continue lisinopril 10mg BID     5. Chronic obstructive pulmonary disease, unspecified COPD type  - stable    6. Current smoker  - currently smokes about 1 ppd  - not interested in quitting at this time  - discussed importance of smoking cessation as it relates to underlying CHF and COPD            Carmen Dubon NP  [facility]    This note was created using M*Modal voice recognition software that " occasionally misinterpreted phrases or words. Please excuse any grammatical errors.

## 2022-11-22 DIAGNOSIS — I49.3 FREQUENT PVCS: ICD-10-CM

## 2022-11-22 DIAGNOSIS — I50.22 CHRONIC SYSTOLIC HEART FAILURE: ICD-10-CM

## 2022-11-22 RX ORDER — CARVEDILOL 25 MG/1
TABLET ORAL
Qty: 60 TABLET | Refills: 0 | Status: SHIPPED | OUTPATIENT
Start: 2022-11-22 | End: 2022-12-15

## 2022-11-22 RX ORDER — LISINOPRIL 10 MG/1
TABLET ORAL
Qty: 60 TABLET | Refills: 0 | Status: SHIPPED | OUTPATIENT
Start: 2022-11-22 | End: 2023-01-06

## 2022-11-29 NOTE — PROGRESS NOTES
Subjective:   Patient ID:  Melissa Petty is a 57 y.o. female who presents for evaluation of Congestive Heart Failure      HPI    Melissa Petty is a 57 year old female who presents to Arrhythmia clinic for 6 month follow up. Her current medical conditions include DCM, HFmrEF of 45%, PVCs, COPD,h/o covid,(9/2021) smoker. Shee returns today and states she is doing ok.       She has been using her ibuprofen more regularly due to right knee pain.   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     Has been having to use her lasix regularly due to worsening shortness of breath episodes.       Denies chest pain or anginal equivalents. Reports regular walking without any issues lately. NO leg swelling or claudications. No recent falls, syncope or near syncopal events. Reports compliance with medications and dietary restrictions. NO CNS complaints to suggest TIA or CVA today. No signs of abnormal bleeding.     Still smoking 1 ppd.         Past Medical History:   Diagnosis Date    Cardiomyopathy     CHF (congestive heart failure)     COPD exacerbation 5/26/2020    Hypertension        Past Surgical History:   Procedure Laterality Date    JOINT REPLACEMENT         Social History     Tobacco Use    Smoking status: Every Day     Packs/day: 1.00     Years: 32.00     Pack years: 32.00     Types: Cigarettes    Smokeless tobacco: Never   Substance Use Topics    Alcohol use: Yes     Comment: Occassionally    Drug use: No       Family History   Problem Relation Age of Onset    Heart failure Mother     Hypertension Brother      Wt Readings from Last 3 Encounters:   11/30/22 56.6 kg (124 lb 12.5 oz)   03/03/22 58.8 kg (129 lb 10.1 oz)   10/06/21 58.8 kg (129 lb 10.1 oz)     Temp Readings from Last 3 Encounters:   09/27/21 97.7 °F (36.5 °C)   09/25/21 98.5 °F (36.9 °C) (Oral)   09/25/21 98.5 °F (36.9 °C) (Oral)     BP Readings from Last 3 Encounters:   11/30/22 120/80   03/03/22 136/80   10/06/21 (!) 140/70     Pulse  Readings from Last 3 Encounters:   11/30/22 88   03/03/22 90   10/06/21 74     Current Outpatient Medications on File Prior to Visit   Medication Sig Dispense Refill    carvediloL (COREG) 25 MG tablet TAKE 1 TABLET BY MOUTH TWICE DAILY WITH MEALS 60 tablet 0    folic acid (FOLVITE) 800 MCG Tab Take 400 mcg by mouth once daily.      furosemide (LASIX) 20 MG tablet Take 1 tablet (20 mg total) by mouth 2 (two) times daily as needed (for any increased swelling, wt gain or shortness of breath). 60 tablet 3    lisinopriL 10 MG tablet Take 1 tablet by mouth twice daily 60 tablet 0    albuterol (PROVENTIL/VENTOLIN HFA) 90 mcg/actuation inhaler INHALE 1 TO 2 PUFFS BY MOUTH EVERY 6 HOURS AS NEEDED FOR WHEEZING (RESCUE) 9 g 0    fluticasone propion-salmeterol 115-21 mcg/dose (ADVAIR HFA) 115-21 mcg/actuation HFAA inhaler Inhale 2 puffs into the lungs every 12 (twelve) hours. Controller 12 g 0    naproxen (NAPROSYN) 500 MG tablet Take 1 tablet by mouth 2 (two) times daily.      thiamine (VITAMIN B-1) 100 MG tablet Take 250 mg by mouth once daily.       Current Facility-Administered Medications on File Prior to Visit   Medication Dose Route Frequency Provider Last Rate Last Admin    acetaminophen tablet 650 mg  650 mg Oral Once PRN Jaspreet Lerma MD        albuterol inhaler 2 puff  2 puff Inhalation Q20 Min PRN Jaspreet Lerma MD        diphenhydrAMINE injection 25 mg  25 mg Intravenous Once PRN Jaspreet Lerma MD        EPINEPHrine (EPIPEN) 0.3 mg/0.3 mL pen injection 0.3 mg  0.3 mg Intramuscular PRN Jaspreet Lerma MD        methylPREDNISolone sodium succinate injection 40 mg  40 mg Intravenous Once PRN Jaspreet Lerma MD        ondansetron disintegrating tablet 4 mg  4 mg Oral Once PRN Jaspreet Lerma MD        sodium chloride 0.9% 500 mL flush bag   Intravenous PRN Jaspreet Lerma MD        sodium chloride 0.9% flush 10 mL  10 mL Intravenous PRN Jaspreet Lerma MD           Review of Systems  "  Constitutional: Positive for malaise/fatigue.   HENT:  Negative for hearing loss and hoarse voice.    Eyes:  Negative for blurred vision and visual disturbance.   Cardiovascular:  Positive for dyspnea on exertion. Negative for chest pain, claudication, irregular heartbeat, leg swelling, near-syncope, orthopnea, palpitations, paroxysmal nocturnal dyspnea and syncope.   Respiratory:  Negative for cough, hemoptysis, shortness of breath, sleep disturbances due to breathing, snoring and wheezing.    Endocrine: Negative for cold intolerance and heat intolerance.   Hematologic/Lymphatic: Does not bruise/bleed easily.   Skin:  Negative for color change, dry skin and nail changes.   Musculoskeletal:  Positive for arthritis and back pain. Negative for joint pain and myalgias.   Gastrointestinal:  Negative for bloating, abdominal pain, constipation, nausea and vomiting.   Genitourinary:  Negative for dysuria, flank pain, hematuria and hesitancy.   Neurological:  Negative for headaches, light-headedness, loss of balance, numbness, paresthesias and weakness.   Psychiatric/Behavioral:  Negative for altered mental status.    Allergic/Immunologic: Negative for environmental allergies.     Objective:/80 (BP Location: Left arm, Patient Position: Sitting)   Pulse 88   Ht 5' 3" (1.6 m)   Wt 56.6 kg (124 lb 12.5 oz)   SpO2 95%   BMI 22.10 kg/m²      Physical Exam  Vitals and nursing note reviewed.   Constitutional:       General: She is not in acute distress.     Appearance: Normal appearance. She is well-developed. She is not ill-appearing.   HENT:      Head: Normocephalic and atraumatic.      Nose: Nose normal.      Mouth/Throat:      Mouth: Mucous membranes are moist.   Eyes:      Pupils: Pupils are equal, round, and reactive to light.   Neck:      Thyroid: No thyromegaly.      Vascular: No JVD.      Trachea: No tracheal deviation.   Cardiovascular:      Rate and Rhythm: Normal rate and regular rhythm.      Chest Wall: " PMI is not displaced.      Pulses: Intact distal pulses.           Radial pulses are 2+ on the right side and 2+ on the left side.        Dorsalis pedis pulses are 2+ on the right side and 2+ on the left side.      Heart sounds: S1 normal and S2 normal. Heart sounds not distant. No murmur heard.  Pulmonary:      Effort: Pulmonary effort is normal. No respiratory distress.      Breath sounds: Normal breath sounds. No wheezing.   Abdominal:      General: Bowel sounds are normal. There is no distension.      Palpations: Abdomen is soft.      Tenderness: There is no abdominal tenderness.   Musculoskeletal:         General: No swelling. Normal range of motion.      Cervical back: Full passive range of motion without pain, normal range of motion and neck supple.      Right ankle: No swelling.      Left ankle: No swelling.   Skin:     General: Skin is warm and dry.      Capillary Refill: Capillary refill takes less than 2 seconds.      Nails: There is no clubbing.   Neurological:      General: No focal deficit present.      Mental Status: She is alert and oriented to person, place, and time.   Psychiatric:         Speech: Speech normal.         Behavior: Behavior normal.         Thought Content: Thought content normal.         Judgment: Judgment normal.       Lab Results   Component Value Date    CHOL 153 05/26/2021     Lab Results   Component Value Date    HDL 52 05/26/2021     Lab Results   Component Value Date    LDLCALC 88.4 05/26/2021     Lab Results   Component Value Date    TRIG 63 05/26/2021     Lab Results   Component Value Date    CHOLHDL 34.0 05/26/2021       Chemistry        Component Value Date/Time     03/03/2022 1508    K 3.8 03/03/2022 1508     03/03/2022 1508    CO2 25 03/03/2022 1508    BUN 13 03/03/2022 1508    CREATININE 0.7 03/03/2022 1508    GLU 83 03/03/2022 1508        Component Value Date/Time    CALCIUM 9.4 03/03/2022 1508    ALKPHOS 88 09/25/2021 1739    AST 20 09/25/2021 1739    ALT  15 09/25/2021 1739    BILITOT 0.5 09/25/2021 1739    ESTGFRAFRICA >60.0 03/03/2022 1508    EGFRNONAA >60.0 03/03/2022 1508          Lab Results   Component Value Date    TSH 0.332 (L) 05/31/2013     Lab Results   Component Value Date    INR 1.1 05/31/2013     Lab Results   Component Value Date    WBC 3.52 (L) 09/25/2021    HGB 17.6 (H) 09/25/2021    HCT 53.5 (H) 09/25/2021    MCV 99 (H) 09/25/2021     (L) 09/25/2021        Assessment:      1. Chronic systolic heart failure, ACC/AHA stage C    2. Abnormal ECG    3. Cardiomyopathy, dilated, nonischemic    4. PVC (premature ventricular contraction)    5. Tobacco abuse        Plan:     Obtain ECHO today to evaluate LVEF given worsening CHF symptoms  CMP BNP today  Will decide next steps after labs and ECHO reviewed    Nicole May, FNP-C [facility]

## 2022-11-30 ENCOUNTER — HOSPITAL ENCOUNTER (OUTPATIENT)
Dept: CARDIOLOGY | Facility: HOSPITAL | Age: 57
Discharge: HOME | End: 2022-11-30
Payer: COMMERCIAL

## 2022-11-30 ENCOUNTER — OFFICE VISIT (OUTPATIENT)
Dept: CARDIOLOGY | Facility: CLINIC | Age: 57
End: 2022-11-30
Payer: COMMERCIAL

## 2022-11-30 ENCOUNTER — HOSPITAL ENCOUNTER (OUTPATIENT)
Dept: CARDIOLOGY | Facility: HOSPITAL | Age: 57
Discharge: HOME | End: 2022-11-30
Attending: NURSE PRACTITIONER
Payer: COMMERCIAL

## 2022-11-30 ENCOUNTER — TELEPHONE (OUTPATIENT)
Dept: CARDIOLOGY | Facility: HOSPITAL | Age: 57
End: 2022-11-30
Payer: COMMERCIAL

## 2022-11-30 VITALS
OXYGEN SATURATION: 95 % | WEIGHT: 124.75 LBS | DIASTOLIC BLOOD PRESSURE: 80 MMHG | HEIGHT: 63 IN | HEART RATE: 88 BPM | BODY MASS INDEX: 22.11 KG/M2 | SYSTOLIC BLOOD PRESSURE: 120 MMHG

## 2022-11-30 VITALS
WEIGHT: 124 LBS | HEIGHT: 63 IN | HEART RATE: 78 BPM | BODY MASS INDEX: 21.97 KG/M2 | DIASTOLIC BLOOD PRESSURE: 80 MMHG | SYSTOLIC BLOOD PRESSURE: 120 MMHG

## 2022-11-30 DIAGNOSIS — Z72.0 TOBACCO ABUSE: ICD-10-CM

## 2022-11-30 DIAGNOSIS — Q21.12 PFO (PATENT FORAMEN OVALE): ICD-10-CM

## 2022-11-30 DIAGNOSIS — I49.3 FREQUENT PVCS: ICD-10-CM

## 2022-11-30 DIAGNOSIS — I50.22 CHRONIC SYSTOLIC HEART FAILURE, ACC/AHA STAGE C: Primary | ICD-10-CM

## 2022-11-30 DIAGNOSIS — I50.22 CHRONIC SYSTOLIC HEART FAILURE, ACC/AHA STAGE C: ICD-10-CM

## 2022-11-30 DIAGNOSIS — I42.0 CARDIOMYOPATHY, DILATED, NONISCHEMIC: ICD-10-CM

## 2022-11-30 DIAGNOSIS — I49.3 PVC (PREMATURE VENTRICULAR CONTRACTION): ICD-10-CM

## 2022-11-30 DIAGNOSIS — R94.31 ABNORMAL ECG: ICD-10-CM

## 2022-11-30 DIAGNOSIS — I50.22 CHRONIC SYSTOLIC HEART FAILURE: ICD-10-CM

## 2022-11-30 LAB
AORTIC ROOT ANNULUS: 3.27 CM
ASCENDING AORTA: 2.54 CM
AV INDEX (PROSTH): 0.6
AV MEAN GRADIENT: 2 MMHG
AV PEAK GRADIENT: 3 MMHG
AV REGURGITATION PRESSURE HALF TIME: 605.6 MS
AV VALVE AREA: 1.91 CM2
AV VELOCITY RATIO: 0.67
BSA FOR ECHO PROCEDURE: 1.58 M2
CV ECHO LV RWT: 0.33 CM
DOP CALC AO PEAK VEL: 0.86 M/S
DOP CALC AO VTI: 15.4 CM
DOP CALC LVOT AREA: 3.2 CM2
DOP CALC LVOT DIAMETER: 2.02 CM
DOP CALC LVOT PEAK VEL: 0.58 M/S
DOP CALC LVOT STROKE VOLUME: 29.47 CM3
DOP CALC RVOT PEAK VEL: 0.41 M/S
DOP CALC RVOT VTI: 8.9 CM
DOP CALCLVOT PEAK VEL VTI: 9.2 CM
E WAVE DECELERATION TIME: 117.78 MSEC
E/A RATIO: 2.62
E/E' RATIO: 20.92 M/S
ECHO LV POSTERIOR WALL: 1.08 CM (ref 0.6–1.1)
EJECTION FRACTION: 25 %
FRACTIONAL SHORTENING: 13 % (ref 28–44)
INTERVENTRICULAR SEPTUM: 1.01 CM (ref 0.6–1.1)
IVRT: 82.78 MSEC
LA MAJOR: 6.23 CM
LA MINOR: 6.2 CM
LA WIDTH: 4 CM
LEFT ATRIUM SIZE: 4.87 CM
LEFT ATRIUM VOLUME INDEX MOD: 52.2 ML/M2
LEFT ATRIUM VOLUME INDEX: 65.1 ML/M2
LEFT ATRIUM VOLUME MOD: 82.42 CM3
LEFT ATRIUM VOLUME: 102.91 CM3
LEFT INTERNAL DIMENSION IN SYSTOLE: 5.77 CM (ref 2.1–4)
LEFT VENTRICLE DIASTOLIC VOLUME INDEX: 142.5 ML/M2
LEFT VENTRICLE DIASTOLIC VOLUME: 225.15 ML
LEFT VENTRICLE MASS INDEX: 196 G/M2
LEFT VENTRICLE SYSTOLIC VOLUME INDEX: 104.1 ML/M2
LEFT VENTRICLE SYSTOLIC VOLUME: 164.49 ML
LEFT VENTRICULAR INTERNAL DIMENSION IN DIASTOLE: 6.62 CM (ref 3.5–6)
LEFT VENTRICULAR MASS: 308.99 G
LV LATERAL E/E' RATIO: 27.2 M/S
LV SEPTAL E/E' RATIO: 17 M/S
LVOT MG: 0.74 MMHG
LVOT MV: 0.41 CM/S
MV PEAK A VEL: 0.52 M/S
MV PEAK E VEL: 1.36 M/S
MV STENOSIS PRESSURE HALF TIME: 34.16 MS
MV VALVE AREA P 1/2 METHOD: 6.44 CM2
PISA AR MAX VEL: 2.6 M/S
PISA TR MAX VEL: 3.51 M/S
PULM VEIN S/D RATIO: 0.67
PV MEAN GRADIENT: 0.37 MMHG
PV PEAK D VEL: 0.52 M/S
PV PEAK S VEL: 0.35 M/S
PV PEAK VELOCITY: 0.8 CM/S
RA MAJOR: 4.09 CM
RA PRESSURE: 8 MMHG
RA WIDTH: 3.94 CM
RIGHT VENTRICULAR END-DIASTOLIC DIMENSION: 3.55 CM
SINUS: 2.89 CM
STJ: 2.51 CM
TDI LATERAL: 0.05 M/S
TDI SEPTAL: 0.08 M/S
TDI: 0.07 M/S
TR MAX PG: 49 MMHG
TRICUSPID ANNULAR PLANE SYSTOLIC EXCURSION: 1.59 CM
TV REST PULMONARY ARTERY PRESSURE: 57 MMHG

## 2022-11-30 PROCEDURE — 3079F DIAST BP 80-89 MM HG: CPT | Mod: CPTII,S$GLB,, | Performed by: NURSE PRACTITIONER

## 2022-11-30 PROCEDURE — 99214 PR OFFICE/OUTPT VISIT, EST, LEVL IV, 30-39 MIN: ICD-10-PCS | Mod: S$GLB,,, | Performed by: NURSE PRACTITIONER

## 2022-11-30 PROCEDURE — 3008F BODY MASS INDEX DOCD: CPT | Mod: CPTII,S$GLB,, | Performed by: NURSE PRACTITIONER

## 2022-11-30 PROCEDURE — 93306 ECHO (CUPID ONLY): ICD-10-PCS | Mod: 26,,, | Performed by: INTERNAL MEDICINE

## 2022-11-30 PROCEDURE — 93005 ELECTROCARDIOGRAM TRACING: CPT

## 2022-11-30 PROCEDURE — 3074F SYST BP LT 130 MM HG: CPT | Mod: CPTII,S$GLB,, | Performed by: NURSE PRACTITIONER

## 2022-11-30 PROCEDURE — 93010 ELECTROCARDIOGRAM REPORT: CPT | Mod: ,,, | Performed by: INTERNAL MEDICINE

## 2022-11-30 PROCEDURE — 3074F PR MOST RECENT SYSTOLIC BLOOD PRESSURE < 130 MM HG: ICD-10-PCS | Mod: CPTII,S$GLB,, | Performed by: NURSE PRACTITIONER

## 2022-11-30 PROCEDURE — 99214 OFFICE O/P EST MOD 30 MIN: CPT | Mod: S$GLB,,, | Performed by: NURSE PRACTITIONER

## 2022-11-30 PROCEDURE — 4010F ACE/ARB THERAPY RXD/TAKEN: CPT | Mod: CPTII,S$GLB,, | Performed by: NURSE PRACTITIONER

## 2022-11-30 PROCEDURE — 4010F PR ACE/ARB THEARPY RXD/TAKEN: ICD-10-PCS | Mod: CPTII,S$GLB,, | Performed by: NURSE PRACTITIONER

## 2022-11-30 PROCEDURE — 99999 PR PBB SHADOW E&M-EST. PATIENT-LVL V: CPT | Mod: PBBFAC,,, | Performed by: NURSE PRACTITIONER

## 2022-11-30 PROCEDURE — 93010 EKG 12-LEAD: ICD-10-PCS | Mod: ,,, | Performed by: INTERNAL MEDICINE

## 2022-11-30 PROCEDURE — 3008F PR BODY MASS INDEX (BMI) DOCUMENTED: ICD-10-PCS | Mod: CPTII,S$GLB,, | Performed by: NURSE PRACTITIONER

## 2022-11-30 PROCEDURE — 93306 TTE W/DOPPLER COMPLETE: CPT | Mod: 26,,, | Performed by: INTERNAL MEDICINE

## 2022-11-30 PROCEDURE — 93306 TTE W/DOPPLER COMPLETE: CPT

## 2022-11-30 PROCEDURE — 99999 PR PBB SHADOW E&M-EST. PATIENT-LVL V: ICD-10-PCS | Mod: PBBFAC,,, | Performed by: NURSE PRACTITIONER

## 2022-11-30 PROCEDURE — 1159F PR MEDICATION LIST DOCUMENTED IN MEDICAL RECORD: ICD-10-PCS | Mod: CPTII,S$GLB,, | Performed by: NURSE PRACTITIONER

## 2022-11-30 PROCEDURE — 3079F PR MOST RECENT DIASTOLIC BLOOD PRESSURE 80-89 MM HG: ICD-10-PCS | Mod: CPTII,S$GLB,, | Performed by: NURSE PRACTITIONER

## 2022-11-30 PROCEDURE — 1159F MED LIST DOCD IN RCRD: CPT | Mod: CPTII,S$GLB,, | Performed by: NURSE PRACTITIONER

## 2022-11-30 RX ORDER — ASPIRIN 81 MG/1
81 TABLET ORAL DAILY
Qty: 30 TABLET | Refills: 6 | Status: SHIPPED | OUTPATIENT
Start: 2022-11-30 | End: 2023-11-30

## 2022-11-30 RX ORDER — DAPAGLIFLOZIN 10 MG/1
10 TABLET, FILM COATED ORAL DAILY
Qty: 30 TABLET | Refills: 3 | Status: SHIPPED | OUTPATIENT
Start: 2022-11-30 | End: 2023-03-28

## 2022-11-30 RX ORDER — TORSEMIDE 10 MG/1
10 TABLET ORAL DAILY
Qty: 30 TABLET | Refills: 11 | Status: SHIPPED | OUTPATIENT
Start: 2022-11-30 | End: 2023-11-30

## 2022-11-30 NOTE — TELEPHONE ENCOUNTER
Please call patient    ECHO reviewed  Her heart function has decreased to 25%  She also has a communication in her heart which is abnormal, called a Patent foramen ovale, which places her at an increased risk to have a stroke.     She should start Aspirin 81 mg daily    BNP is significantly elevated on labs  Renal function stable  Potassium WNL    We need to retool her medications to get her out of this acute heart failure episode    -Add Torsemide 10 mg daily  -Add Farxiga 10 mg daily    RTC in 2 weeks

## 2022-12-08 ENCOUNTER — TELEPHONE (OUTPATIENT)
Dept: CARDIOLOGY | Facility: CLINIC | Age: 57
End: 2022-12-08
Payer: COMMERCIAL

## 2022-12-08 NOTE — TELEPHONE ENCOUNTER
----- Message from Afsaneh Vogel sent at 12/8/2022  2:03 PM CST -----  Contact: [phone]  Pt is calling regarding blood pressure states her pressure have been dropping , also states it started dropping soon as she started taking medication prescribed this week . Please call back at [phone] . Thanks/dj

## 2022-12-08 NOTE — TELEPHONE ENCOUNTER
I called the patient for symptom check.     I spoke to the patient.     She says, since taking the medication she has been having problems with dizziness and lightheadedness. She borrowed her friends blood pressure machine and it has been:    Yesterday bp was: 94/66, 95/58    Now 120/74 88 no symptoms at the exact minute     Last night she did not taking the lisinopril or the carvedilol and this morning she took the lisinopril and carvedilol but held the farxiga.     Please advise.

## 2022-12-08 NOTE — TELEPHONE ENCOUNTER
Message  Received: Today  Sara Jj, JETT Cantrell LPN  Caller: Unspecified (Today,  3:23 PM)  Hold lisinopril   Take Coreg daily   Take Farxiga around noon time     Make sure she is wearing her lifevest.     Please start weighing regularly     Make sure she knows about appt next week.     Thanks       The patient has been notified of this information and all questions answered.     pt sent by Dr. Barbara navarro for eval of right foot infection and angiocath tomorrow morning

## 2022-12-15 ENCOUNTER — LAB VISIT (OUTPATIENT)
Dept: LAB | Facility: HOSPITAL | Age: 57
End: 2022-12-15
Attending: NURSE PRACTITIONER
Payer: COMMERCIAL

## 2022-12-15 ENCOUNTER — OFFICE VISIT (OUTPATIENT)
Dept: CARDIOLOGY | Facility: CLINIC | Age: 57
End: 2022-12-15
Payer: COMMERCIAL

## 2022-12-15 VITALS
DIASTOLIC BLOOD PRESSURE: 76 MMHG | SYSTOLIC BLOOD PRESSURE: 108 MMHG | WEIGHT: 118.19 LBS | HEART RATE: 80 BPM | BODY MASS INDEX: 20.94 KG/M2 | HEIGHT: 63 IN | OXYGEN SATURATION: 95 %

## 2022-12-15 DIAGNOSIS — I49.3 PVC (PREMATURE VENTRICULAR CONTRACTION): ICD-10-CM

## 2022-12-15 DIAGNOSIS — Z72.0 TOBACCO ABUSE: ICD-10-CM

## 2022-12-15 DIAGNOSIS — I50.9 CHF (NYHA CLASS III, ACC/AHA STAGE C): Chronic | ICD-10-CM

## 2022-12-15 DIAGNOSIS — I42.0 CARDIOMYOPATHY, DILATED, NONISCHEMIC: ICD-10-CM

## 2022-12-15 DIAGNOSIS — I50.22 CHRONIC SYSTOLIC HEART FAILURE, ACC/AHA STAGE C: Primary | ICD-10-CM

## 2022-12-15 DIAGNOSIS — I50.22 CHRONIC SYSTOLIC HEART FAILURE, ACC/AHA STAGE C: ICD-10-CM

## 2022-12-15 LAB
ANION GAP SERPL CALC-SCNC: 10 MMOL/L (ref 8–16)
BNP SERPL-MCNC: 400 PG/ML (ref 0–99)
BUN SERPL-MCNC: 14 MG/DL (ref 6–20)
CALCIUM SERPL-MCNC: 9.8 MG/DL (ref 8.7–10.5)
CHLORIDE SERPL-SCNC: 103 MMOL/L (ref 95–110)
CO2 SERPL-SCNC: 27 MMOL/L (ref 23–29)
CREAT SERPL-MCNC: 0.7 MG/DL (ref 0.5–1.4)
EST. GFR  (NO RACE VARIABLE): >60 ML/MIN/1.73 M^2
GLUCOSE SERPL-MCNC: 88 MG/DL (ref 70–110)
POTASSIUM SERPL-SCNC: 5 MMOL/L (ref 3.5–5.1)
SODIUM SERPL-SCNC: 140 MMOL/L (ref 136–145)

## 2022-12-15 PROCEDURE — 1159F MED LIST DOCD IN RCRD: CPT | Mod: CPTII,S$GLB,, | Performed by: NURSE PRACTITIONER

## 2022-12-15 PROCEDURE — 99999 PR PBB SHADOW E&M-EST. PATIENT-LVL V: ICD-10-PCS | Mod: PBBFAC,,, | Performed by: NURSE PRACTITIONER

## 2022-12-15 PROCEDURE — 83880 ASSAY OF NATRIURETIC PEPTIDE: CPT | Performed by: NURSE PRACTITIONER

## 2022-12-15 PROCEDURE — 4010F PR ACE/ARB THEARPY RXD/TAKEN: ICD-10-PCS | Mod: CPTII,S$GLB,, | Performed by: NURSE PRACTITIONER

## 2022-12-15 PROCEDURE — 3008F PR BODY MASS INDEX (BMI) DOCUMENTED: ICD-10-PCS | Mod: CPTII,S$GLB,, | Performed by: NURSE PRACTITIONER

## 2022-12-15 PROCEDURE — 36415 COLL VENOUS BLD VENIPUNCTURE: CPT | Performed by: NURSE PRACTITIONER

## 2022-12-15 PROCEDURE — 80048 BASIC METABOLIC PNL TOTAL CA: CPT | Performed by: NURSE PRACTITIONER

## 2022-12-15 PROCEDURE — 4010F ACE/ARB THERAPY RXD/TAKEN: CPT | Mod: CPTII,S$GLB,, | Performed by: NURSE PRACTITIONER

## 2022-12-15 PROCEDURE — 1159F PR MEDICATION LIST DOCUMENTED IN MEDICAL RECORD: ICD-10-PCS | Mod: CPTII,S$GLB,, | Performed by: NURSE PRACTITIONER

## 2022-12-15 PROCEDURE — 99214 PR OFFICE/OUTPT VISIT, EST, LEVL IV, 30-39 MIN: ICD-10-PCS | Mod: S$GLB,,, | Performed by: NURSE PRACTITIONER

## 2022-12-15 PROCEDURE — 99214 OFFICE O/P EST MOD 30 MIN: CPT | Mod: S$GLB,,, | Performed by: NURSE PRACTITIONER

## 2022-12-15 PROCEDURE — 3074F PR MOST RECENT SYSTOLIC BLOOD PRESSURE < 130 MM HG: ICD-10-PCS | Mod: CPTII,S$GLB,, | Performed by: NURSE PRACTITIONER

## 2022-12-15 PROCEDURE — 3008F BODY MASS INDEX DOCD: CPT | Mod: CPTII,S$GLB,, | Performed by: NURSE PRACTITIONER

## 2022-12-15 PROCEDURE — 3078F DIAST BP <80 MM HG: CPT | Mod: CPTII,S$GLB,, | Performed by: NURSE PRACTITIONER

## 2022-12-15 PROCEDURE — 3074F SYST BP LT 130 MM HG: CPT | Mod: CPTII,S$GLB,, | Performed by: NURSE PRACTITIONER

## 2022-12-15 PROCEDURE — 99999 PR PBB SHADOW E&M-EST. PATIENT-LVL V: CPT | Mod: PBBFAC,,, | Performed by: NURSE PRACTITIONER

## 2022-12-15 PROCEDURE — 3078F PR MOST RECENT DIASTOLIC BLOOD PRESSURE < 80 MM HG: ICD-10-PCS | Mod: CPTII,S$GLB,, | Performed by: NURSE PRACTITIONER

## 2022-12-15 RX ORDER — BISOPROLOL FUMARATE 5 MG/1
5 TABLET, FILM COATED ORAL DAILY
Qty: 30 TABLET | Refills: 11 | Status: SHIPPED | OUTPATIENT
Start: 2022-12-15 | End: 2023-03-20 | Stop reason: SDUPTHER

## 2022-12-22 ENCOUNTER — TELEPHONE (OUTPATIENT)
Dept: CARDIOLOGY | Facility: CLINIC | Age: 57
End: 2022-12-22
Payer: COMMERCIAL

## 2022-12-22 DIAGNOSIS — I50.22 CHRONIC SYSTOLIC HEART FAILURE, ACC/AHA STAGE C: Primary | ICD-10-CM

## 2022-12-22 NOTE — TELEPHONE ENCOUNTER
----- Message from JETT Mcginnis sent at 12/15/2022 10:51 AM CST -----  Please call patient    Labs reviewed  BNP has significantly improved   BMP WNL    Decrease Torsemide to every other day  Continue Farxiga 10 mg daily  Given issues with low BP- switch Coreg to Bisoprolol 5 mg daily  Log BP and weights  Please call for log in 1 week    Thanks  Sara

## 2022-12-22 NOTE — TELEPHONE ENCOUNTER
I called the patient for symptom check.     I spoke to the patient.     She says she still does not have any energy, but denies     118 no change this week    12/16 107/71  12/17 129/84   12/18 125/77  101/67  12/19 116/73  103/75  12/20 127/70  99/60  12/21 123/75

## 2023-01-06 ENCOUNTER — OFFICE VISIT (OUTPATIENT)
Dept: CARDIOLOGY | Facility: CLINIC | Age: 58
End: 2023-01-06
Payer: COMMERCIAL

## 2023-01-06 VITALS
WEIGHT: 119.06 LBS | HEIGHT: 63 IN | DIASTOLIC BLOOD PRESSURE: 77 MMHG | OXYGEN SATURATION: 96 % | RESPIRATION RATE: 16 BRPM | BODY MASS INDEX: 21.09 KG/M2 | SYSTOLIC BLOOD PRESSURE: 116 MMHG | HEART RATE: 70 BPM

## 2023-01-06 DIAGNOSIS — I50.9 CHF (NYHA CLASS II, ACC/AHA STAGE C): ICD-10-CM

## 2023-01-06 DIAGNOSIS — I42.0 CARDIOMYOPATHY, DILATED, NONISCHEMIC: ICD-10-CM

## 2023-01-06 DIAGNOSIS — I49.3 PVC (PREMATURE VENTRICULAR CONTRACTION): ICD-10-CM

## 2023-01-06 DIAGNOSIS — Z72.0 TOBACCO ABUSE: ICD-10-CM

## 2023-01-06 DIAGNOSIS — I10 ESSENTIAL HYPERTENSION: ICD-10-CM

## 2023-01-06 DIAGNOSIS — I50.22 CHRONIC SYSTOLIC HEART FAILURE, ACC/AHA STAGE C: Primary | ICD-10-CM

## 2023-01-06 PROCEDURE — 99999 PR PBB SHADOW E&M-EST. PATIENT-LVL V: ICD-10-PCS | Mod: PBBFAC,,, | Performed by: NURSE PRACTITIONER

## 2023-01-06 PROCEDURE — 4010F PR ACE/ARB THEARPY RXD/TAKEN: ICD-10-PCS | Mod: CPTII,S$GLB,, | Performed by: NURSE PRACTITIONER

## 2023-01-06 PROCEDURE — 3074F SYST BP LT 130 MM HG: CPT | Mod: CPTII,S$GLB,, | Performed by: NURSE PRACTITIONER

## 2023-01-06 PROCEDURE — 1159F MED LIST DOCD IN RCRD: CPT | Mod: CPTII,S$GLB,, | Performed by: NURSE PRACTITIONER

## 2023-01-06 PROCEDURE — 3078F DIAST BP <80 MM HG: CPT | Mod: CPTII,S$GLB,, | Performed by: NURSE PRACTITIONER

## 2023-01-06 PROCEDURE — 4010F ACE/ARB THERAPY RXD/TAKEN: CPT | Mod: CPTII,S$GLB,, | Performed by: NURSE PRACTITIONER

## 2023-01-06 PROCEDURE — 1159F PR MEDICATION LIST DOCUMENTED IN MEDICAL RECORD: ICD-10-PCS | Mod: CPTII,S$GLB,, | Performed by: NURSE PRACTITIONER

## 2023-01-06 PROCEDURE — 99999 PR PBB SHADOW E&M-EST. PATIENT-LVL V: CPT | Mod: PBBFAC,,, | Performed by: NURSE PRACTITIONER

## 2023-01-06 PROCEDURE — 99214 PR OFFICE/OUTPT VISIT, EST, LEVL IV, 30-39 MIN: ICD-10-PCS | Mod: S$GLB,,, | Performed by: NURSE PRACTITIONER

## 2023-01-06 PROCEDURE — 3008F BODY MASS INDEX DOCD: CPT | Mod: CPTII,S$GLB,, | Performed by: NURSE PRACTITIONER

## 2023-01-06 PROCEDURE — 3008F PR BODY MASS INDEX (BMI) DOCUMENTED: ICD-10-PCS | Mod: CPTII,S$GLB,, | Performed by: NURSE PRACTITIONER

## 2023-01-06 PROCEDURE — 3078F PR MOST RECENT DIASTOLIC BLOOD PRESSURE < 80 MM HG: ICD-10-PCS | Mod: CPTII,S$GLB,, | Performed by: NURSE PRACTITIONER

## 2023-01-06 PROCEDURE — 99214 OFFICE O/P EST MOD 30 MIN: CPT | Mod: S$GLB,,, | Performed by: NURSE PRACTITIONER

## 2023-01-06 PROCEDURE — 3074F PR MOST RECENT SYSTOLIC BLOOD PRESSURE < 130 MM HG: ICD-10-PCS | Mod: CPTII,S$GLB,, | Performed by: NURSE PRACTITIONER

## 2023-01-06 RX ORDER — SACUBITRIL AND VALSARTAN 24; 26 MG/1; MG/1
1 TABLET, FILM COATED ORAL 2 TIMES DAILY
Qty: 60 TABLET | Refills: 3 | Status: SHIPPED | OUTPATIENT
Start: 2023-01-06 | End: 2023-04-24 | Stop reason: SDUPTHER

## 2023-01-19 NOTE — PROGRESS NOTES
Subjective:   Patient ID:  Melissa Petty is a 57 y.o. female who presents for evaluation of Chronic Systolic Heart Failure        HPI    Melissa Petty is a 57 year old female who presents to Arrhythmia clinic for 6 month follow up. Her current medical conditions include DCM, HFmrEF of 45%, PVCs, COPD,h/o covid,(9/2021) smoker. Shee returns today and states she is doing ok.       She has been using her ibuprofen more regularly due to right knee pain.   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     Has been having to use her lasix regularly due to worsening shortness of breath episodes.       Denies chest pain or anginal equivalents. Reports regular walking without any issues lately. NO leg swelling or claudications. No recent falls, syncope or near syncopal events. Reports compliance with medications and dietary restrictions. NO CNS complaints to suggest TIA or CVA today. No signs of abnormal bleeding.     Still smoking 1 ppd.       12/15/2022 update    Melissa Petty returns for 2 week follow up.     She reports significant improvement in CHF symptoms since med adjustments after last visit. Wearing LifeVest since last visit without any firing.     Still continues to smoke but has cut back since last visit.     Last 2 days, she has had noted improvement in shortness of breath. Denies orthopnea, PND or abdominal bloating. Has lost 6 pounds since last visit.     BP had dropped last week and ACEi has been on hold since that time. BP improved with med adjustments. No leg swelling on exam today.     Long discussion regarding CHF treatment plan. CHF educational booklet provided to patient today.     [place] paperwork completed today, tentative date on return to work will be late March or early April.     1/6/2023 update    Melissa Petty returns for follow up.     She is doing well with med adjustments at last visit.     Weight is stable today. Still has some shortness of breath with exertion but is  much improved from initial visit with me.     BP is much improved since med adjustments at last visit.     Tolerating Bisoprolol 5 mg daily without any cardiac side effects.     No chest pain or anginal equivalents.   Today, she is NYHA Stage C FC II.     Will add Entresto 24/26 nightly starting today. Denies orthopnea, PND or abdominal bloating. No leg swelling on exam today.     1/20/2023 update    Melissa Petty returns for 2 week CHF follow up. She continues to improve with each visit and GDMT titration. Tolerating Entresto without any side effects. BP well controlled. Has not used any PRN torsemide since last visit. Wearing LifeVest without any firing.     Has not returned to work on FMLA leave at this time.     NO chest pain or anginal equivalents. Doing well with limiting salt and fluid intake recently. Weight is stable today in office. BP much improved today.     Still has some degree of shortness of breath when she exerts herself. But able to complete regular tasks without any shortness of breath or fatigue. Denies any bendopnea symptoms. No leg swelling on exam today.     Past Medical History:   Diagnosis Date    Cardiomyopathy     CHF (congestive heart failure)     COPD exacerbation 5/26/2020    Hypertension        Past Surgical History:   Procedure Laterality Date    JOINT REPLACEMENT         Social History     Tobacco Use    Smoking status: Every Day     Packs/day: 1.00     Years: 32.00     Pack years: 32.00     Types: Cigarettes    Smokeless tobacco: Never   Substance Use Topics    Alcohol use: Yes     Comment: Occassionally    Drug use: No       Family History   Problem Relation Age of Onset    Heart failure Mother     Hypertension Brother      Wt Readings from Last 3 Encounters:   01/20/23 55.3 kg (121 lb 14.6 oz)   01/06/23 54 kg (119 lb 0.8 oz)   12/15/22 53.6 kg (118 lb 2.7 oz)     Temp Readings from Last 3 Encounters:   09/27/21 97.7 °F (36.5 °C)   09/25/21 98.5 °F (36.9 °C) (Oral)   09/25/21  98.5 °F (36.9 °C) (Oral)     BP Readings from Last 3 Encounters:   01/20/23 126/78   01/06/23 116/77   12/15/22 108/76     Pulse Readings from Last 3 Encounters:   01/20/23 66   01/06/23 70   12/15/22 80     Current Outpatient Medications on File Prior to Visit   Medication Sig Dispense Refill    albuterol (PROVENTIL/VENTOLIN HFA) 90 mcg/actuation inhaler INHALE 1 TO 2 PUFFS BY MOUTH EVERY 6 HOURS AS NEEDED FOR WHEEZING (RESCUE) 9 g 0    aspirin (ECOTRIN) 81 MG EC tablet Take 1 tablet (81 mg total) by mouth once daily. 30 tablet 6    bisoprolol (ZEBETA) 5 MG tablet Take 1 tablet (5 mg total) by mouth once daily. 30 tablet 11    dapagliflozin (FARXIGA) 10 mg tablet Take 1 tablet (10 mg total) by mouth once daily. 30 tablet 3    folic acid (FOLVITE) 800 MCG Tab Take 400 mcg by mouth once daily.      naproxen (NAPROSYN) 500 MG tablet Take 1 tablet by mouth 2 (two) times daily.      sacubitriL-valsartan (ENTRESTO) 24-26 mg per tablet Take 1 tablet by mouth 2 (two) times daily. 60 tablet 3    thiamine 100 MG tablet Take 250 mg by mouth once daily.      torsemide (DEMADEX) 10 MG Tab Take 1 tablet (10 mg total) by mouth once daily. 30 tablet 11    fluticasone propion-salmeterol 115-21 mcg/dose (ADVAIR HFA) 115-21 mcg/actuation HFAA inhaler Inhale 2 puffs into the lungs every 12 (twelve) hours. Controller 12 g 0     Current Facility-Administered Medications on File Prior to Visit   Medication Dose Route Frequency Provider Last Rate Last Admin    acetaminophen tablet 650 mg  650 mg Oral Once PRN Jaspreet Lerma MD        albuterol inhaler 2 puff  2 puff Inhalation Q20 Min PRN Jaspreet Lerma MD        diphenhydrAMINE injection 25 mg  25 mg Intravenous Once PRN Jaspreet Lerma MD        EPINEPHrine (EPIPEN) 0.3 mg/0.3 mL pen injection 0.3 mg  0.3 mg Intramuscular PRN Jaspreet Lerma MD        methylPREDNISolone sodium succinate injection 40 mg  40 mg Intravenous Once PRN Jaspreet Lerma MD        ondansetron  "disintegrating tablet 4 mg  4 mg Oral Once PRN Jaspreet Lerma MD        sodium chloride 0.9% 500 mL flush bag   Intravenous PRN Jaspreet Lerma MD        sodium chloride 0.9% flush 10 mL  10 mL Intravenous PRN Jaspreet Lerma MD           Review of Systems   Constitutional: Positive for malaise/fatigue.   HENT:  Negative for hearing loss and hoarse voice.    Eyes:  Negative for blurred vision and visual disturbance.   Cardiovascular:  Positive for dyspnea on exertion. Negative for chest pain, claudication, irregular heartbeat, leg swelling, near-syncope, orthopnea, palpitations, paroxysmal nocturnal dyspnea and syncope.   Respiratory:  Negative for cough, hemoptysis, shortness of breath, sleep disturbances due to breathing, snoring and wheezing.    Endocrine: Negative for cold intolerance and heat intolerance.   Hematologic/Lymphatic: Does not bruise/bleed easily.   Skin:  Negative for color change, dry skin and nail changes.   Musculoskeletal:  Positive for arthritis and back pain. Negative for joint pain and myalgias.   Gastrointestinal:  Negative for bloating, abdominal pain, constipation, nausea and vomiting.   Genitourinary:  Negative for dysuria, flank pain, hematuria and hesitancy.   Neurological:  Negative for headaches, light-headedness, loss of balance, numbness, paresthesias and weakness.   Psychiatric/Behavioral:  Negative for altered mental status.    Allergic/Immunologic: Negative for environmental allergies.     Objective:/78   Pulse 66   Resp 16   Ht 5' 3" (1.6 m)   Wt 55.3 kg (121 lb 14.6 oz)   SpO2 97%   BMI 21.60 kg/m²      Physical Exam  Vitals and nursing note reviewed.   Constitutional:       General: She is not in acute distress.     Appearance: Normal appearance. She is well-developed. She is not ill-appearing.   HENT:      Head: Normocephalic and atraumatic.      Nose: Nose normal.      Mouth/Throat:      Mouth: Mucous membranes are moist.   Eyes:      Pupils: Pupils are " equal, round, and reactive to light.   Neck:      Thyroid: No thyromegaly.      Vascular: No JVD.      Trachea: No tracheal deviation.   Cardiovascular:      Rate and Rhythm: Normal rate and regular rhythm.      Chest Wall: PMI is not displaced.      Pulses: Intact distal pulses.           Radial pulses are 2+ on the right side and 2+ on the left side.        Dorsalis pedis pulses are 2+ on the right side and 2+ on the left side.      Heart sounds: S1 normal and S2 normal. Heart sounds not distant. No murmur heard.  Pulmonary:      Effort: Pulmonary effort is normal. No respiratory distress.      Breath sounds: Normal breath sounds. No wheezing.   Abdominal:      General: Bowel sounds are normal. There is no distension.      Palpations: Abdomen is soft.      Tenderness: There is no abdominal tenderness.   Musculoskeletal:         General: No swelling. Normal range of motion.      Cervical back: Full passive range of motion without pain, normal range of motion and neck supple.      Right ankle: No swelling.      Left ankle: No swelling.   Skin:     General: Skin is warm and dry.      Capillary Refill: Capillary refill takes less than 2 seconds.      Nails: There is no clubbing.   Neurological:      General: No focal deficit present.      Mental Status: She is alert and oriented to person, place, and time.   Psychiatric:         Speech: Speech normal.         Behavior: Behavior normal.         Thought Content: Thought content normal.         Judgment: Judgment normal.       Lab Results   Component Value Date    CHOL 153 05/26/2021     Lab Results   Component Value Date    HDL 52 05/26/2021     Lab Results   Component Value Date    LDLCALC 88.4 05/26/2021     Lab Results   Component Value Date    TRIG 63 05/26/2021     Lab Results   Component Value Date    CHOLHDL 34.0 05/26/2021       Chemistry        Component Value Date/Time     12/15/2022 0901    K 5.0 12/15/2022 0901     12/15/2022 0901    CO2 27  12/15/2022 0901    BUN 14 12/15/2022 0901    CREATININE 0.7 12/15/2022 0901    GLU 88 12/15/2022 0901        Component Value Date/Time    CALCIUM 9.8 12/15/2022 0901    ALKPHOS 91 11/30/2022 1004    AST 26 11/30/2022 1004    ALT 20 11/30/2022 1004    BILITOT 1.1 (H) 11/30/2022 1004    ESTGFRAFRICA >60.0 03/03/2022 1508    EGFRNONAA >60.0 03/03/2022 1508          Lab Results   Component Value Date    TSH 0.332 (L) 05/31/2013     Lab Results   Component Value Date    INR 1.1 05/31/2013     Lab Results   Component Value Date    WBC 3.52 (L) 09/25/2021    HGB 17.6 (H) 09/25/2021    HCT 53.5 (H) 09/25/2021    MCV 99 (H) 09/25/2021     (L) 09/25/2021        Assessment:      1. Chronic systolic heart failure, ACC/AHA stage C    2. CHF (NYHA class II, ACC/AHA stage C)    3. Cardiomyopathy, dilated, nonischemic    4. PVC (premature ventricular contraction)    5. Tobacco abuse    6. Essential hypertension        Plan:   Increase Entresto to 24/26 BID  Continue all other medications    CHF SYNOPSIS:    GDMT:  ACE/ARB/ARNI: Entresto 24/26 BID  Beta Blocker: Bisoprolol 5 mg daily  MRA: on hold  SGLT2: Farxiga 10 mg daily  Diuretic: Torsemide 10 mg daily PRN    Daily weights  Profile BP at home  Ok to use Torsemide PRN for shortness of breath, leg swelling or weight gain of 3 pounds overnight or 5 pounds in 1 week  Call if any issues prior to next visit  RTC in 2 weeks with BMP BNP    Nicole May, FNP-C [facility]

## 2023-01-20 ENCOUNTER — OFFICE VISIT (OUTPATIENT)
Dept: CARDIOLOGY | Facility: CLINIC | Age: 58
End: 2023-01-20
Payer: COMMERCIAL

## 2023-01-20 VITALS
RESPIRATION RATE: 16 BRPM | DIASTOLIC BLOOD PRESSURE: 78 MMHG | WEIGHT: 121.94 LBS | HEIGHT: 63 IN | SYSTOLIC BLOOD PRESSURE: 126 MMHG | OXYGEN SATURATION: 97 % | BODY MASS INDEX: 21.61 KG/M2 | HEART RATE: 66 BPM

## 2023-01-20 DIAGNOSIS — I49.3 PVC (PREMATURE VENTRICULAR CONTRACTION): ICD-10-CM

## 2023-01-20 DIAGNOSIS — I50.9 CHF (NYHA CLASS II, ACC/AHA STAGE C): ICD-10-CM

## 2023-01-20 DIAGNOSIS — Z72.0 TOBACCO ABUSE: ICD-10-CM

## 2023-01-20 DIAGNOSIS — I50.22 CHRONIC SYSTOLIC HEART FAILURE, ACC/AHA STAGE C: Primary | ICD-10-CM

## 2023-01-20 DIAGNOSIS — I10 ESSENTIAL HYPERTENSION: ICD-10-CM

## 2023-01-20 DIAGNOSIS — I42.0 CARDIOMYOPATHY, DILATED, NONISCHEMIC: ICD-10-CM

## 2023-01-20 PROCEDURE — 1159F PR MEDICATION LIST DOCUMENTED IN MEDICAL RECORD: ICD-10-PCS | Mod: CPTII,S$GLB,, | Performed by: NURSE PRACTITIONER

## 2023-01-20 PROCEDURE — 3078F PR MOST RECENT DIASTOLIC BLOOD PRESSURE < 80 MM HG: ICD-10-PCS | Mod: CPTII,S$GLB,, | Performed by: NURSE PRACTITIONER

## 2023-01-20 PROCEDURE — 99999 PR PBB SHADOW E&M-EST. PATIENT-LVL IV: ICD-10-PCS | Mod: PBBFAC,,, | Performed by: NURSE PRACTITIONER

## 2023-01-20 PROCEDURE — 1159F MED LIST DOCD IN RCRD: CPT | Mod: CPTII,S$GLB,, | Performed by: NURSE PRACTITIONER

## 2023-01-20 PROCEDURE — 3078F DIAST BP <80 MM HG: CPT | Mod: CPTII,S$GLB,, | Performed by: NURSE PRACTITIONER

## 2023-01-20 PROCEDURE — 4010F ACE/ARB THERAPY RXD/TAKEN: CPT | Mod: CPTII,S$GLB,, | Performed by: NURSE PRACTITIONER

## 2023-01-20 PROCEDURE — 3074F SYST BP LT 130 MM HG: CPT | Mod: CPTII,S$GLB,, | Performed by: NURSE PRACTITIONER

## 2023-01-20 PROCEDURE — 99214 PR OFFICE/OUTPT VISIT, EST, LEVL IV, 30-39 MIN: ICD-10-PCS | Mod: S$GLB,,, | Performed by: NURSE PRACTITIONER

## 2023-01-20 PROCEDURE — 3074F PR MOST RECENT SYSTOLIC BLOOD PRESSURE < 130 MM HG: ICD-10-PCS | Mod: CPTII,S$GLB,, | Performed by: NURSE PRACTITIONER

## 2023-01-20 PROCEDURE — 3008F BODY MASS INDEX DOCD: CPT | Mod: CPTII,S$GLB,, | Performed by: NURSE PRACTITIONER

## 2023-01-20 PROCEDURE — 4010F PR ACE/ARB THEARPY RXD/TAKEN: ICD-10-PCS | Mod: CPTII,S$GLB,, | Performed by: NURSE PRACTITIONER

## 2023-01-20 PROCEDURE — 99999 PR PBB SHADOW E&M-EST. PATIENT-LVL IV: CPT | Mod: PBBFAC,,, | Performed by: NURSE PRACTITIONER

## 2023-01-20 PROCEDURE — 3008F PR BODY MASS INDEX (BMI) DOCUMENTED: ICD-10-PCS | Mod: CPTII,S$GLB,, | Performed by: NURSE PRACTITIONER

## 2023-01-20 PROCEDURE — 99214 OFFICE O/P EST MOD 30 MIN: CPT | Mod: S$GLB,,, | Performed by: NURSE PRACTITIONER

## 2023-02-02 NOTE — PROGRESS NOTES
Subjective:   Patient ID:  Melissa Petty is a 58 y.o. female who presents for evaluation of Chronic systolic heart failure        HPI    Melissa Petty is a 57 year old female who presents to Arrhythmia clinic for 6 month follow up. Her current medical conditions include DCM, HFmrEF of 45%, PVCs, COPD,h/o covid,(9/2021) smoker. Shee returns today and states she is doing ok.       She has been using her ibuprofen more regularly due to right knee pain.   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     Has been having to use her lasix regularly due to worsening shortness of breath episodes.       Denies chest pain or anginal equivalents. Reports regular walking without any issues lately. NO leg swelling or claudications. No recent falls, syncope or near syncopal events. Reports compliance with medications and dietary restrictions. NO CNS complaints to suggest TIA or CVA today. No signs of abnormal bleeding.     Still smoking 1 ppd.       12/15/2022 update    Melissa Petty returns for 2 week follow up.     She reports significant improvement in CHF symptoms since med adjustments after last visit. Wearing LifeVest since last visit without any firing.     Still continues to smoke but has cut back since last visit.     Last 2 days, she has had noted improvement in shortness of breath. Denies orthopnea, PND or abdominal bloating. Has lost 6 pounds since last visit.     BP had dropped last week and ACEi has been on hold since that time. BP improved with med adjustments. No leg swelling on exam today.     Long discussion regarding CHF treatment plan. CHF educational booklet provided to patient today.     [place] paperwork completed today, tentative date on return to work will be late March or early April.     1/6/2023 update    Melissa Petty returns for follow up.     She is doing well with med adjustments at last visit.     Weight is stable today. Still has some shortness of breath with exertion but is  much improved from initial visit with me.     BP is much improved since med adjustments at last visit.     Tolerating Bisoprolol 5 mg daily without any cardiac side effects.     No chest pain or anginal equivalents.   Today, she is NYHA Stage C FC II.     Will add Entresto 24/26 nightly starting today. Denies orthopnea, PND or abdominal bloating. No leg swelling on exam today.     1/20/2023 update    Melissa Petty returns for 2 week CHF follow up. She continues to improve with each visit and GDMT titration. Tolerating Entresto without any side effects. BP well controlled. Has not used any PRN torsemide since last visit. Wearing LifeVest without any firing.     Has not returned to work on FMLA leave at this time.     NO chest pain or anginal equivalents. Doing well with limiting salt and fluid intake recently. Weight is stable today in office. BP much improved today.     Still has some degree of shortness of breath when she exerts herself. But able to complete regular tasks without any shortness of breath or fatigue. Denies any bendopnea symptoms. No leg swelling on exam today.     2/3/2023 update    She returns today and states she is doing well.     Denies any CHF symptoms since last visit. Has not used her Torsemide in the past 2 weeks. BP well controlled today.     No chest pain or anginal equivalents. No leg swelling on exam today.     Weight is stable today. Labs pending.    No shortness of breath, PAINTER or palpitations. Denies orthopnea, PND or abdominal bloating.     Doing well with salt and fluid restrictions since last visit.   Tolerating medications without any side effects offered today in clinic.         Past Medical History:   Diagnosis Date    Cardiomyopathy     CHF (congestive heart failure)     COPD exacerbation 5/26/2020    Hypertension        Past Surgical History:   Procedure Laterality Date    JOINT REPLACEMENT         Social History     Tobacco Use    Smoking status: Every Day     Packs/day:  1.00     Years: 32.00     Pack years: 32.00     Types: Cigarettes    Smokeless tobacco: Never   Substance Use Topics    Alcohol use: Yes     Comment: Occassionally    Drug use: No       Family History   Problem Relation Age of Onset    Heart failure Mother     Hypertension Brother      Wt Readings from Last 3 Encounters:   02/03/23 55 kg (121 lb 4.1 oz)   01/20/23 55.3 kg (121 lb 14.6 oz)   01/06/23 54 kg (119 lb 0.8 oz)     Temp Readings from Last 3 Encounters:   09/27/21 97.7 °F (36.5 °C)   09/25/21 98.5 °F (36.9 °C) (Oral)   09/25/21 98.5 °F (36.9 °C) (Oral)     BP Readings from Last 3 Encounters:   02/03/23 122/80   01/20/23 126/78   01/06/23 116/77     Pulse Readings from Last 3 Encounters:   02/03/23 65   01/20/23 66   01/06/23 70     Current Outpatient Medications on File Prior to Visit   Medication Sig Dispense Refill    albuterol (PROVENTIL/VENTOLIN HFA) 90 mcg/actuation inhaler INHALE 1 TO 2 PUFFS BY MOUTH EVERY 6 HOURS AS NEEDED FOR WHEEZING (RESCUE) 9 g 0    aspirin (ECOTRIN) 81 MG EC tablet Take 1 tablet (81 mg total) by mouth once daily. 30 tablet 6    bisoprolol (ZEBETA) 5 MG tablet Take 1 tablet (5 mg total) by mouth once daily. 30 tablet 11    dapagliflozin (FARXIGA) 10 mg tablet Take 1 tablet (10 mg total) by mouth once daily. 30 tablet 3    folic acid (FOLVITE) 800 MCG Tab Take 400 mcg by mouth once daily.      naproxen (NAPROSYN) 500 MG tablet Take 1 tablet by mouth 2 (two) times daily.      sacubitriL-valsartan (ENTRESTO) 24-26 mg per tablet Take 1 tablet by mouth 2 (two) times daily. 60 tablet 3    thiamine 100 MG tablet Take 250 mg by mouth once daily.      torsemide (DEMADEX) 10 MG Tab Take 1 tablet (10 mg total) by mouth once daily. 30 tablet 11    fluticasone propion-salmeterol 115-21 mcg/dose (ADVAIR HFA) 115-21 mcg/actuation HFAA inhaler Inhale 2 puffs into the lungs every 12 (twelve) hours. Controller 12 g 0     Current Facility-Administered Medications on File Prior to Visit    Medication Dose Route Frequency Provider Last Rate Last Admin    acetaminophen tablet 650 mg  650 mg Oral Once PRN Jaspreet Lerma MD        albuterol inhaler 2 puff  2 puff Inhalation Q20 Min PRN Jaspreet Lerma MD        diphenhydrAMINE injection 25 mg  25 mg Intravenous Once PRN Jaspreet Lerma MD        EPINEPHrine (EPIPEN) 0.3 mg/0.3 mL pen injection 0.3 mg  0.3 mg Intramuscular PRN Jaspreet Lerma MD        methylPREDNISolone sodium succinate injection 40 mg  40 mg Intravenous Once PRN Jaspreet Lerma MD        ondansetron disintegrating tablet 4 mg  4 mg Oral Once PRN Jaspreet Lerma MD        sodium chloride 0.9% 500 mL flush bag   Intravenous PRN Jaspreet Lerma MD        sodium chloride 0.9% flush 10 mL  10 mL Intravenous PRN Jaspreet Lerma MD           Review of Systems   Constitutional: Positive for malaise/fatigue.   HENT:  Negative for hearing loss and hoarse voice.    Eyes:  Negative for blurred vision and visual disturbance.   Cardiovascular:  Positive for dyspnea on exertion. Negative for chest pain, claudication, irregular heartbeat, leg swelling, near-syncope, orthopnea, palpitations, paroxysmal nocturnal dyspnea and syncope.   Respiratory:  Negative for cough, hemoptysis, shortness of breath, sleep disturbances due to breathing, snoring and wheezing.    Endocrine: Negative for cold intolerance and heat intolerance.   Hematologic/Lymphatic: Does not bruise/bleed easily.   Skin:  Negative for color change, dry skin and nail changes.   Musculoskeletal:  Positive for arthritis and back pain. Negative for joint pain and myalgias.   Gastrointestinal:  Negative for bloating, abdominal pain, constipation, nausea and vomiting.   Genitourinary:  Negative for dysuria, flank pain, hematuria and hesitancy.   Neurological:  Negative for headaches, light-headedness, loss of balance, numbness, paresthesias and weakness.   Psychiatric/Behavioral:  Negative for altered mental status.   "  Allergic/Immunologic: Negative for environmental allergies.     Objective:/80   Pulse 65   Resp 16   Ht 5' 3" (1.6 m)   Wt 55 kg (121 lb 4.1 oz)   SpO2 98%   BMI 21.48 kg/m²      Physical Exam  Vitals and nursing note reviewed.   Constitutional:       General: She is not in acute distress.     Appearance: Normal appearance. She is well-developed. She is not ill-appearing.   HENT:      Head: Normocephalic and atraumatic.      Nose: Nose normal.      Mouth/Throat:      Mouth: Mucous membranes are moist.   Eyes:      Pupils: Pupils are equal, round, and reactive to light.   Neck:      Thyroid: No thyromegaly.      Vascular: No JVD.      Trachea: No tracheal deviation.   Cardiovascular:      Rate and Rhythm: Normal rate and regular rhythm.      Chest Wall: PMI is not displaced.      Pulses: Intact distal pulses.           Radial pulses are 2+ on the right side and 2+ on the left side.        Dorsalis pedis pulses are 2+ on the right side and 2+ on the left side.      Heart sounds: S1 normal and S2 normal. Heart sounds not distant. No murmur heard.  Pulmonary:      Effort: Pulmonary effort is normal. No respiratory distress.      Breath sounds: Normal breath sounds. No wheezing.   Abdominal:      General: Bowel sounds are normal. There is no distension.      Palpations: Abdomen is soft.      Tenderness: There is no abdominal tenderness.   Musculoskeletal:         General: No swelling. Normal range of motion.      Cervical back: Full passive range of motion without pain, normal range of motion and neck supple.      Right ankle: No swelling.      Left ankle: No swelling.   Skin:     General: Skin is warm and dry.      Capillary Refill: Capillary refill takes less than 2 seconds.      Nails: There is no clubbing.   Neurological:      General: No focal deficit present.      Mental Status: She is alert and oriented to person, place, and time.   Psychiatric:         Speech: Speech normal.         Behavior: " Behavior normal.         Thought Content: Thought content normal.         Judgment: Judgment normal.       Lab Results   Component Value Date    CHOL 153 05/26/2021     Lab Results   Component Value Date    HDL 52 05/26/2021     Lab Results   Component Value Date    LDLCALC 88.4 05/26/2021     Lab Results   Component Value Date    TRIG 63 05/26/2021     Lab Results   Component Value Date    CHOLHDL 34.0 05/26/2021       Chemistry        Component Value Date/Time     12/15/2022 0901    K 5.0 12/15/2022 0901     12/15/2022 0901    CO2 27 12/15/2022 0901    BUN 14 12/15/2022 0901    CREATININE 0.7 12/15/2022 0901    GLU 88 12/15/2022 0901        Component Value Date/Time    CALCIUM 9.8 12/15/2022 0901    ALKPHOS 91 11/30/2022 1004    AST 26 11/30/2022 1004    ALT 20 11/30/2022 1004    BILITOT 1.1 (H) 11/30/2022 1004    ESTGFRAFRICA >60.0 03/03/2022 1508    EGFRNONAA >60.0 03/03/2022 1508          Lab Results   Component Value Date    TSH 0.332 (L) 05/31/2013     Lab Results   Component Value Date    INR 1.1 05/31/2013     Lab Results   Component Value Date    WBC 3.52 (L) 09/25/2021    HGB 17.6 (H) 09/25/2021    HCT 53.5 (H) 09/25/2021    MCV 99 (H) 09/25/2021     (L) 09/25/2021        Assessment:      1. CHF (NYHA class II, ACC/AHA stage C)    2. Chronic systolic heart failure, ACC/AHA stage C    3. Cardiomyopathy, dilated, nonischemic    4. PVC (premature ventricular contraction)          Plan:   Increase Entresto to 2 tabs in AM and 1 tab in PM  Call with lab results  Dash diet 2 gm sodium restriction  RTC in 1 month with ECHO to evaluate improvement in LVEF    CHF SYNOPSIS:    GDMT:  ACE/ARB/ARNI: Entresto 24/26 BID  Beta Blocker: Bisoprolol 5 mg daily  MRA: on hold  SGLT2: Farxiga 10 mg daily  Diuretic: Torsemide 10 mg daily PRN        Nicole May, FNP-C [facility]

## 2023-02-03 ENCOUNTER — TELEPHONE (OUTPATIENT)
Dept: CARDIOLOGY | Facility: CLINIC | Age: 58
End: 2023-02-03

## 2023-02-03 ENCOUNTER — OFFICE VISIT (OUTPATIENT)
Dept: CARDIOLOGY | Facility: CLINIC | Age: 58
End: 2023-02-03
Payer: COMMERCIAL

## 2023-02-03 ENCOUNTER — LAB VISIT (OUTPATIENT)
Dept: LAB | Facility: HOSPITAL | Age: 58
End: 2023-02-03
Attending: NURSE PRACTITIONER
Payer: COMMERCIAL

## 2023-02-03 VITALS
HEART RATE: 65 BPM | DIASTOLIC BLOOD PRESSURE: 80 MMHG | SYSTOLIC BLOOD PRESSURE: 122 MMHG | WEIGHT: 121.25 LBS | BODY MASS INDEX: 21.48 KG/M2 | RESPIRATION RATE: 16 BRPM | HEIGHT: 63 IN | OXYGEN SATURATION: 98 %

## 2023-02-03 DIAGNOSIS — I42.0 CARDIOMYOPATHY, DILATED, NONISCHEMIC: ICD-10-CM

## 2023-02-03 DIAGNOSIS — I49.3 PVC (PREMATURE VENTRICULAR CONTRACTION): ICD-10-CM

## 2023-02-03 DIAGNOSIS — I50.22 CHRONIC SYSTOLIC HEART FAILURE, ACC/AHA STAGE C: ICD-10-CM

## 2023-02-03 DIAGNOSIS — I50.9 CHF (NYHA CLASS II, ACC/AHA STAGE C): Primary | ICD-10-CM

## 2023-02-03 LAB
ANION GAP SERPL CALC-SCNC: 13 MMOL/L (ref 8–16)
BNP SERPL-MCNC: 274 PG/ML (ref 0–99)
BUN SERPL-MCNC: 10 MG/DL (ref 6–20)
CALCIUM SERPL-MCNC: 9.9 MG/DL (ref 8.7–10.5)
CHLORIDE SERPL-SCNC: 103 MMOL/L (ref 95–110)
CO2 SERPL-SCNC: 24 MMOL/L (ref 23–29)
CREAT SERPL-MCNC: 0.7 MG/DL (ref 0.5–1.4)
EST. GFR  (NO RACE VARIABLE): >60 ML/MIN/1.73 M^2
GLUCOSE SERPL-MCNC: 82 MG/DL (ref 70–110)
POTASSIUM SERPL-SCNC: 4.2 MMOL/L (ref 3.5–5.1)
SODIUM SERPL-SCNC: 140 MMOL/L (ref 136–145)

## 2023-02-03 PROCEDURE — 99214 OFFICE O/P EST MOD 30 MIN: CPT | Mod: S$GLB,,, | Performed by: NURSE PRACTITIONER

## 2023-02-03 PROCEDURE — 4010F PR ACE/ARB THEARPY RXD/TAKEN: ICD-10-PCS | Mod: CPTII,S$GLB,, | Performed by: NURSE PRACTITIONER

## 2023-02-03 PROCEDURE — 3074F SYST BP LT 130 MM HG: CPT | Mod: CPTII,S$GLB,, | Performed by: NURSE PRACTITIONER

## 2023-02-03 PROCEDURE — 3079F PR MOST RECENT DIASTOLIC BLOOD PRESSURE 80-89 MM HG: ICD-10-PCS | Mod: CPTII,S$GLB,, | Performed by: NURSE PRACTITIONER

## 2023-02-03 PROCEDURE — 99999 PR PBB SHADOW E&M-EST. PATIENT-LVL V: ICD-10-PCS | Mod: PBBFAC,,, | Performed by: NURSE PRACTITIONER

## 2023-02-03 PROCEDURE — 99214 PR OFFICE/OUTPT VISIT, EST, LEVL IV, 30-39 MIN: ICD-10-PCS | Mod: S$GLB,,, | Performed by: NURSE PRACTITIONER

## 2023-02-03 PROCEDURE — 3074F PR MOST RECENT SYSTOLIC BLOOD PRESSURE < 130 MM HG: ICD-10-PCS | Mod: CPTII,S$GLB,, | Performed by: NURSE PRACTITIONER

## 2023-02-03 PROCEDURE — 80048 BASIC METABOLIC PNL TOTAL CA: CPT | Performed by: NURSE PRACTITIONER

## 2023-02-03 PROCEDURE — 1159F PR MEDICATION LIST DOCUMENTED IN MEDICAL RECORD: ICD-10-PCS | Mod: CPTII,S$GLB,, | Performed by: NURSE PRACTITIONER

## 2023-02-03 PROCEDURE — 3079F DIAST BP 80-89 MM HG: CPT | Mod: CPTII,S$GLB,, | Performed by: NURSE PRACTITIONER

## 2023-02-03 PROCEDURE — 4010F ACE/ARB THERAPY RXD/TAKEN: CPT | Mod: CPTII,S$GLB,, | Performed by: NURSE PRACTITIONER

## 2023-02-03 PROCEDURE — 83880 ASSAY OF NATRIURETIC PEPTIDE: CPT | Performed by: NURSE PRACTITIONER

## 2023-02-03 PROCEDURE — 3008F PR BODY MASS INDEX (BMI) DOCUMENTED: ICD-10-PCS | Mod: CPTII,S$GLB,, | Performed by: NURSE PRACTITIONER

## 2023-02-03 PROCEDURE — 36415 COLL VENOUS BLD VENIPUNCTURE: CPT | Performed by: NURSE PRACTITIONER

## 2023-02-03 PROCEDURE — 99999 PR PBB SHADOW E&M-EST. PATIENT-LVL V: CPT | Mod: PBBFAC,,, | Performed by: NURSE PRACTITIONER

## 2023-02-03 PROCEDURE — 1159F MED LIST DOCD IN RCRD: CPT | Mod: CPTII,S$GLB,, | Performed by: NURSE PRACTITIONER

## 2023-02-03 PROCEDURE — 3008F BODY MASS INDEX DOCD: CPT | Mod: CPTII,S$GLB,, | Performed by: NURSE PRACTITIONER

## 2023-02-03 NOTE — TELEPHONE ENCOUNTER
Patient notified              Labs reviewed   BNP has decreased nicely with medication adjustments   Renal function WNL   Potassium WNL     Continue same medications as discussed during visit   Keep next follow up with ECHO as scheduled

## 2023-02-03 NOTE — PROGRESS NOTES
Please notify patient    Labs reviewed  BNP has decreased nicely with medication adjustments  Renal function WNL  Potassium WNL    Continue same medications as discussed during visit  Keep next follow up with ECHO as scheduled    Thanks  Sara Hebert back once notified.

## 2023-03-09 ENCOUNTER — OFFICE VISIT (OUTPATIENT)
Dept: CARDIOLOGY | Facility: CLINIC | Age: 58
End: 2023-03-09
Payer: COMMERCIAL

## 2023-03-09 ENCOUNTER — HOSPITAL ENCOUNTER (OUTPATIENT)
Dept: CARDIOLOGY | Facility: HOSPITAL | Age: 58
Discharge: HOME | End: 2023-03-09
Attending: NURSE PRACTITIONER
Payer: COMMERCIAL

## 2023-03-09 VITALS
SYSTOLIC BLOOD PRESSURE: 118 MMHG | DIASTOLIC BLOOD PRESSURE: 60 MMHG | HEIGHT: 63 IN | RESPIRATION RATE: 16 BRPM | WEIGHT: 121 LBS | HEIGHT: 63 IN | DIASTOLIC BLOOD PRESSURE: 80 MMHG | HEART RATE: 78 BPM | HEART RATE: 72 BPM | WEIGHT: 119.25 LBS | BODY MASS INDEX: 21.13 KG/M2 | OXYGEN SATURATION: 95 % | BODY MASS INDEX: 21.44 KG/M2 | SYSTOLIC BLOOD PRESSURE: 122 MMHG

## 2023-03-09 DIAGNOSIS — I42.0 CARDIOMYOPATHY, DILATED, NONISCHEMIC: ICD-10-CM

## 2023-03-09 DIAGNOSIS — I10 ESSENTIAL HYPERTENSION: Primary | ICD-10-CM

## 2023-03-09 DIAGNOSIS — I50.22 CHRONIC SYSTOLIC HEART FAILURE, ACC/AHA STAGE C: ICD-10-CM

## 2023-03-09 DIAGNOSIS — I50.9 CHF (NYHA CLASS II, ACC/AHA STAGE C): ICD-10-CM

## 2023-03-09 DIAGNOSIS — I49.3 PVC (PREMATURE VENTRICULAR CONTRACTION): ICD-10-CM

## 2023-03-09 DIAGNOSIS — Z72.0 TOBACCO ABUSE: ICD-10-CM

## 2023-03-09 LAB
AORTIC ROOT ANNULUS: 3.24 CM
ASCENDING AORTA: 3.15 CM
AV INDEX (PROSTH): 0.6
AV MEAN GRADIENT: 3 MMHG
AV PEAK GRADIENT: 6 MMHG
AV VALVE AREA: 2.05 CM2
AV VELOCITY RATIO: 0.68
BSA FOR ECHO PROCEDURE: 1.56 M2
CV ECHO LV RWT: 0.42 CM
DOP CALC AO PEAK VEL: 1.2 M/S
DOP CALC AO VTI: 23.9 CM
DOP CALC LVOT AREA: 3.4 CM2
DOP CALC LVOT DIAMETER: 2.09 CM
DOP CALC LVOT PEAK VEL: 0.81 M/S
DOP CALC LVOT STROKE VOLUME: 49.03 CM3
DOP CALC RVOT PEAK VEL: 0.6 M/S
DOP CALC RVOT VTI: 11.2 CM
DOP CALCLVOT PEAK VEL VTI: 14.3 CM
E WAVE DECELERATION TIME: 213.29 MSEC
E/A RATIO: 0.56
E/E' RATIO: 6.88 M/S
ECHO LV POSTERIOR WALL: 1.02 CM (ref 0.6–1.1)
FRACTIONAL SHORTENING: 16 % (ref 28–44)
INTERVENTRICULAR SEPTUM: 0.97 CM (ref 0.6–1.1)
IVRT: 102.76 MSEC
LA MAJOR: 4.13 CM
LA MINOR: 4.4 CM
LA WIDTH: 2.9 CM
LEFT ATRIUM SIZE: 3.19 CM
LEFT ATRIUM VOLUME INDEX MOD: 22.8 ML/M2
LEFT ATRIUM VOLUME INDEX: 21.5 ML/M2
LEFT ATRIUM VOLUME MOD: 35.61 CM3
LEFT ATRIUM VOLUME: 33.5 CM3
LEFT INTERNAL DIMENSION IN SYSTOLE: 4.08 CM (ref 2.1–4)
LEFT VENTRICLE DIASTOLIC VOLUME INDEX: 70.78 ML/M2
LEFT VENTRICLE DIASTOLIC VOLUME: 110.42 ML
LEFT VENTRICLE MASS INDEX: 111 G/M2
LEFT VENTRICLE SYSTOLIC VOLUME INDEX: 46.9 ML/M2
LEFT VENTRICLE SYSTOLIC VOLUME: 73.16 ML
LEFT VENTRICULAR INTERNAL DIMENSION IN DIASTOLE: 4.86 CM (ref 3.5–6)
LEFT VENTRICULAR MASS: 172.51 G
LV LATERAL E/E' RATIO: 6.11 M/S
LV SEPTAL E/E' RATIO: 7.86 M/S
LVOT MG: 1.6 MMHG
LVOT MV: 0.6 CM/S
MV PEAK A VEL: 0.99 M/S
MV PEAK E VEL: 0.55 M/S
MV STENOSIS PRESSURE HALF TIME: 61.85 MS
MV VALVE AREA P 1/2 METHOD: 3.56 CM2
PISA TR MAX VEL: 2.57 M/S
PULM VEIN S/D RATIO: 1.42
PV MEAN GRADIENT: 0.89 MMHG
PV PEAK D VEL: 0.38 M/S
PV PEAK S VEL: 0.54 M/S
PV PEAK VELOCITY: 0.89 CM/S
QEF: 37 %
RA MAJOR: 3.59 CM
RA PRESSURE: 3 MMHG
RA WIDTH: 3.17 CM
RIGHT VENTRICULAR END-DIASTOLIC DIMENSION: 2.66 CM
SINUS: 2.78 CM
STJ: 2.78 CM
TDI LATERAL: 0.09 M/S
TDI SEPTAL: 0.07 M/S
TDI: 0.08 M/S
TR MAX PG: 26 MMHG
TV REST PULMONARY ARTERY PRESSURE: 29 MMHG

## 2023-03-09 PROCEDURE — 3008F BODY MASS INDEX DOCD: CPT | Mod: CPTII,S$GLB,, | Performed by: NURSE PRACTITIONER

## 2023-03-09 PROCEDURE — 99999 PR PBB SHADOW E&M-EST. PATIENT-LVL V: CPT | Mod: PBBFAC,,, | Performed by: NURSE PRACTITIONER

## 2023-03-09 PROCEDURE — 3078F PR MOST RECENT DIASTOLIC BLOOD PRESSURE < 80 MM HG: ICD-10-PCS | Mod: CPTII,S$GLB,, | Performed by: NURSE PRACTITIONER

## 2023-03-09 PROCEDURE — 4010F ACE/ARB THERAPY RXD/TAKEN: CPT | Mod: CPTII,S$GLB,, | Performed by: NURSE PRACTITIONER

## 2023-03-09 PROCEDURE — 93356 PR IMG, MYOCARDIAL STRAIN, SPECKLE TRACKING: ICD-10-PCS | Mod: ,,, | Performed by: INTERNAL MEDICINE

## 2023-03-09 PROCEDURE — 99214 PR OFFICE/OUTPT VISIT, EST, LEVL IV, 30-39 MIN: ICD-10-PCS | Mod: S$GLB,,, | Performed by: NURSE PRACTITIONER

## 2023-03-09 PROCEDURE — 93356 MYOCRD STRAIN IMG SPCKL TRCK: CPT

## 2023-03-09 PROCEDURE — 3074F PR MOST RECENT SYSTOLIC BLOOD PRESSURE < 130 MM HG: ICD-10-PCS | Mod: CPTII,S$GLB,, | Performed by: NURSE PRACTITIONER

## 2023-03-09 PROCEDURE — 93356 MYOCRD STRAIN IMG SPCKL TRCK: CPT | Mod: ,,, | Performed by: INTERNAL MEDICINE

## 2023-03-09 PROCEDURE — 93306 ECHO (CUPID ONLY): ICD-10-PCS | Mod: 26,,, | Performed by: INTERNAL MEDICINE

## 2023-03-09 PROCEDURE — 4010F PR ACE/ARB THEARPY RXD/TAKEN: ICD-10-PCS | Mod: CPTII,S$GLB,, | Performed by: NURSE PRACTITIONER

## 2023-03-09 PROCEDURE — 1159F PR MEDICATION LIST DOCUMENTED IN MEDICAL RECORD: ICD-10-PCS | Mod: CPTII,S$GLB,, | Performed by: NURSE PRACTITIONER

## 2023-03-09 PROCEDURE — 99999 PR PBB SHADOW E&M-EST. PATIENT-LVL V: ICD-10-PCS | Mod: PBBFAC,,, | Performed by: NURSE PRACTITIONER

## 2023-03-09 PROCEDURE — 3078F DIAST BP <80 MM HG: CPT | Mod: CPTII,S$GLB,, | Performed by: NURSE PRACTITIONER

## 2023-03-09 PROCEDURE — 93306 TTE W/DOPPLER COMPLETE: CPT | Mod: 26,,, | Performed by: INTERNAL MEDICINE

## 2023-03-09 PROCEDURE — 1159F MED LIST DOCD IN RCRD: CPT | Mod: CPTII,S$GLB,, | Performed by: NURSE PRACTITIONER

## 2023-03-09 PROCEDURE — 3074F SYST BP LT 130 MM HG: CPT | Mod: CPTII,S$GLB,, | Performed by: NURSE PRACTITIONER

## 2023-03-09 PROCEDURE — 3008F PR BODY MASS INDEX (BMI) DOCUMENTED: ICD-10-PCS | Mod: CPTII,S$GLB,, | Performed by: NURSE PRACTITIONER

## 2023-03-09 PROCEDURE — 99214 OFFICE O/P EST MOD 30 MIN: CPT | Mod: S$GLB,,, | Performed by: NURSE PRACTITIONER

## 2023-03-09 NOTE — PROGRESS NOTES
Subjective:   Patient ID:  Melissa Petty is a 58 y.o. female who presents for evaluation of Follow-up and Congestive Heart Failure        HPI    Melissa Petty is a 57 year old female who presents to [facility] for 6 month follow up. Her current medical conditions include DCM, HFmrEF of 45%, PVCs, COPD,h/o covid,(9/2021) smoker. Shee returns today and states she is doing ok.       She has been using her ibuprofen more regularly due to right knee pain.   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     Has been having to use her lasix regularly due to worsening shortness of breath episodes.       Denies chest pain or anginal equivalents. Reports regular walking without any issues lately. NO leg swelling or claudications. No recent falls, syncope or near syncopal events. Reports compliance with medications and dietary restrictions. NO CNS complaints to suggest TIA or CVA today. No signs of abnormal bleeding.     Still smoking 1 ppd.       12/15/2022 update    Melissa Petty returns for 2 week follow up.     She reports significant improvement in CHF symptoms since med adjustments after last visit. Wearing LifeVest since last visit without any firing.     Still continues to smoke but has cut back since last visit.     Last 2 days, she has had noted improvement in shortness of breath. Denies orthopnea, PND or abdominal bloating. Has lost 6 pounds since last visit.     BP had dropped last week and ACEi has been on hold since that time. BP improved with med adjustments. No leg swelling on exam today.     Long discussion regarding CHF treatment plan. CHF educational booklet provided to patient today.     [place] paperwork completed today, tentative date on return to work will be late March or early April.     1/6/2023 update    Melissa Petty returns for follow up.     She is doing well with med adjustments at last visit.     Weight is stable today. Still has some shortness of breath with exertion  but is much improved from initial visit with me.     BP is much improved since med adjustments at last visit.     Tolerating Bisoprolol 5 mg daily without any cardiac side effects.     No chest pain or anginal equivalents.   Today, she is NYHA Stage C FC II.     Will add Entresto 24/26 nightly starting today. Denies orthopnea, PND or abdominal bloating. No leg swelling on exam today.     1/20/2023 update    Melissa Petty returns for 2 week CHF follow up. She continues to improve with each visit and GDMT titration. Tolerating Entresto without any side effects. BP well controlled. Has not used any PRN torsemide since last visit. Wearing LifeVest without any firing.     Has not returned to work on FMLA leave at this time.     NO chest pain or anginal equivalents. Doing well with limiting salt and fluid intake recently. Weight is stable today in office. BP much improved today.     Still has some degree of shortness of breath when she exerts herself. But able to complete regular tasks without any shortness of breath or fatigue. Denies any bendopnea symptoms. No leg swelling on exam today.     2/3/2023 update    She returns today and states she is doing well.     Denies any CHF symptoms since last visit. Has not used her Torsemide in the past 2 weeks. BP well controlled today.     No chest pain or anginal equivalents. No leg swelling on exam today.     Weight is stable today. Labs pending.    No shortness of breath, PAINTER or palpitations. Denies orthopnea, PND or abdominal bloating.     Doing well with salt and fluid restrictions since last visit.   Tolerating medications without any side effects offered today in clinic.     3/9/2023 update    Melissa Petty returns for CHF follow up and ECHO to update LVEF with GDMT.     ECHO pending.     Has been doing well CHF wise. NO chest pain or anginal equivalents. Denies shortness of breath, PAINTER or palpitations. NO leg swelling on exam today.     Has been doing well with salt  restrictions.     Tolerating medications without any issues today.     Await echo results to decide next steps.     Past Medical History:   Diagnosis Date    Cardiomyopathy     CHF (congestive heart failure)     COPD exacerbation 5/26/2020    Hypertension        Past Surgical History:   Procedure Laterality Date    JOINT REPLACEMENT         Social History     Tobacco Use    Smoking status: Every Day     Packs/day: 1.00     Years: 32.00     Pack years: 32.00     Types: Cigarettes    Smokeless tobacco: Never   Substance Use Topics    Alcohol use: Yes     Comment: Occassionally    Drug use: No       Family History   Problem Relation Age of Onset    Heart failure Mother     Hypertension Brother      Wt Readings from Last 3 Encounters:   03/09/23 54.1 kg (119 lb 4.3 oz)   03/09/23 54.9 kg (121 lb)   02/03/23 55 kg (121 lb 4.1 oz)     Temp Readings from Last 3 Encounters:   09/27/21 97.7 °F (36.5 °C)   09/25/21 98.5 °F (36.9 °C) (Oral)   09/25/21 98.5 °F (36.9 °C) (Oral)     BP Readings from Last 3 Encounters:   03/09/23 118/60   03/09/23 122/80   02/03/23 122/80     Pulse Readings from Last 3 Encounters:   03/09/23 72   03/09/23 78   02/03/23 65     Current Outpatient Medications on File Prior to Visit   Medication Sig Dispense Refill    albuterol (PROVENTIL/VENTOLIN HFA) 90 mcg/actuation inhaler INHALE 1 TO 2 PUFFS BY MOUTH EVERY 6 HOURS AS NEEDED FOR WHEEZING (RESCUE) 9 g 0    aspirin (ECOTRIN) 81 MG EC tablet Take 1 tablet (81 mg total) by mouth once daily. 30 tablet 6    bisoprolol (ZEBETA) 5 MG tablet Take 1 tablet (5 mg total) by mouth once daily. 30 tablet 11    dapagliflozin (FARXIGA) 10 mg tablet Take 1 tablet (10 mg total) by mouth once daily. 30 tablet 3    folic acid (FOLVITE) 800 MCG Tab Take 400 mcg by mouth once daily.      naproxen (NAPROSYN) 500 MG tablet Take 1 tablet by mouth 2 (two) times daily.      sacubitriL-valsartan (ENTRESTO) 24-26 mg per tablet Take 1 tablet by mouth 2 (two) times daily. 60  tablet 3    thiamine 100 MG tablet Take 250 mg by mouth once daily.      torsemide (DEMADEX) 10 MG Tab Take 1 tablet (10 mg total) by mouth once daily. 30 tablet 11    fluticasone propion-salmeterol 115-21 mcg/dose (ADVAIR HFA) 115-21 mcg/actuation HFAA inhaler Inhale 2 puffs into the lungs every 12 (twelve) hours. Controller 12 g 0     Current Facility-Administered Medications on File Prior to Visit   Medication Dose Route Frequency Provider Last Rate Last Admin    acetaminophen tablet 650 mg  650 mg Oral Once PRN Jaspreet Lerma MD        albuterol inhaler 2 puff  2 puff Inhalation Q20 Min PRN Jaspreet Lerma MD        diphenhydrAMINE injection 25 mg  25 mg Intravenous Once PRN Jaspreet Lerma MD        EPINEPHrine (EPIPEN) 0.3 mg/0.3 mL pen injection 0.3 mg  0.3 mg Intramuscular PRN Jaspreet Lerma MD        methylPREDNISolone sodium succinate injection 40 mg  40 mg Intravenous Once PRN Jaspreet Lerma MD        ondansetron disintegrating tablet 4 mg  4 mg Oral Once PRN Jaspreet Lerma MD        sodium chloride 0.9% 500 mL flush bag   Intravenous PRN Jaspreet Lerma MD        sodium chloride 0.9% flush 10 mL  10 mL Intravenous PRN Jaspreet Lerma MD           Review of Systems   Constitutional: Positive for malaise/fatigue.   HENT:  Negative for hearing loss and hoarse voice.    Eyes:  Negative for blurred vision and visual disturbance.   Cardiovascular:  Positive for dyspnea on exertion. Negative for chest pain, claudication, irregular heartbeat, leg swelling, near-syncope, orthopnea, palpitations, paroxysmal nocturnal dyspnea and syncope.   Respiratory:  Negative for cough, hemoptysis, shortness of breath, sleep disturbances due to breathing, snoring and wheezing.    Endocrine: Negative for cold intolerance and heat intolerance.   Hematologic/Lymphatic: Does not bruise/bleed easily.   Skin:  Negative for color change, dry skin and nail changes.   Musculoskeletal:  Positive for arthritis and back  "pain. Negative for joint pain and myalgias.   Gastrointestinal:  Negative for bloating, abdominal pain, constipation, nausea and vomiting.   Genitourinary:  Negative for dysuria, flank pain, hematuria and hesitancy.   Neurological:  Negative for headaches, light-headedness, loss of balance, numbness, paresthesias and weakness.   Psychiatric/Behavioral:  Negative for altered mental status.    Allergic/Immunologic: Negative for environmental allergies.     Objective:/60   Pulse 72   Resp 16   Ht 5' 3" (1.6 m)   Wt 54.1 kg (119 lb 4.3 oz)   SpO2 95%   BMI 21.13 kg/m²      Physical Exam  Vitals and nursing note reviewed.   Constitutional:       General: She is not in acute distress.     Appearance: Normal appearance. She is well-developed. She is not ill-appearing.   HENT:      Head: Normocephalic and atraumatic.      Nose: Nose normal.      Mouth/Throat:      Mouth: Mucous membranes are moist.   Eyes:      Pupils: Pupils are equal, round, and reactive to light.   Neck:      Thyroid: No thyromegaly.      Vascular: No JVD.      Trachea: No tracheal deviation.   Cardiovascular:      Rate and Rhythm: Normal rate and regular rhythm.      Chest Wall: PMI is not displaced.      Pulses: Intact distal pulses.           Radial pulses are 2+ on the right side and 2+ on the left side.        Dorsalis pedis pulses are 2+ on the right side and 2+ on the left side.      Heart sounds: S1 normal and S2 normal. Heart sounds not distant. No murmur heard.  Pulmonary:      Effort: Pulmonary effort is normal. No respiratory distress.      Breath sounds: Normal breath sounds. No wheezing.   Abdominal:      General: Bowel sounds are normal. There is no distension.      Palpations: Abdomen is soft.      Tenderness: There is no abdominal tenderness.   Musculoskeletal:         General: No swelling. Normal range of motion.      Cervical back: Full passive range of motion without pain, normal range of motion and neck supple.      Right " ankle: No swelling.      Left ankle: No swelling.   Skin:     General: Skin is warm and dry.      Capillary Refill: Capillary refill takes less than 2 seconds.      Nails: There is no clubbing.   Neurological:      General: No focal deficit present.      Mental Status: She is alert and oriented to person, place, and time.   Psychiatric:         Speech: Speech normal.         Behavior: Behavior normal.         Thought Content: Thought content normal.         Judgment: Judgment normal.       Lab Results   Component Value Date    CHOL 153 05/26/2021     Lab Results   Component Value Date    HDL 52 05/26/2021     Lab Results   Component Value Date    LDLCALC 88.4 05/26/2021     Lab Results   Component Value Date    TRIG 63 05/26/2021     Lab Results   Component Value Date    CHOLHDL 34.0 05/26/2021       Chemistry        Component Value Date/Time     02/03/2023 0852    K 4.2 02/03/2023 0852     02/03/2023 0852    CO2 24 02/03/2023 0852    BUN 10 02/03/2023 0852    CREATININE 0.7 02/03/2023 0852    GLU 82 02/03/2023 0852        Component Value Date/Time    CALCIUM 9.9 02/03/2023 0852    ALKPHOS 91 11/30/2022 1004    AST 26 11/30/2022 1004    ALT 20 11/30/2022 1004    BILITOT 1.1 (H) 11/30/2022 1004    ESTGFRAFRICA >60.0 03/03/2022 1508    EGFRNONAA >60.0 03/03/2022 1508          Lab Results   Component Value Date    TSH 0.332 (L) 05/31/2013     Lab Results   Component Value Date    INR 1.1 05/31/2013     Lab Results   Component Value Date    WBC 3.52 (L) 09/25/2021    HGB 17.6 (H) 09/25/2021    HCT 53.5 (H) 09/25/2021    MCV 99 (H) 09/25/2021     (L) 09/25/2021        Assessment:      1. Essential hypertension    2. CHF (NYHA class II, ACC/AHA stage C)    3. Chronic systolic heart failure, ACC/AHA stage C    4. Cardiomyopathy, dilated, nonischemic    5. PVC (premature ventricular contraction)    6. Tobacco abuse            Plan:       CHF SYNOPSIS:    GDMT:  ACE/ARB/ARNI: Entresto 24/26 BID 2 tabs I AM  and 1 tab in PM  Beta Blocker: Bisoprolol 5 mg daily  MRA: on hold  SGLT2: Farxiga 10 mg daily  Diuretic: Torsemide 10 mg daily PRN    Await echo results to decide next steps and return to work.     Nicole May, FNP-C [facility]

## 2023-03-10 ENCOUNTER — TELEPHONE (OUTPATIENT)
Dept: CARDIOLOGY | Facility: CLINIC | Age: 58
End: 2023-03-10
Payer: COMMERCIAL

## 2023-03-10 DIAGNOSIS — I50.9 CHF (NYHA CLASS II, ACC/AHA STAGE C): Primary | ICD-10-CM

## 2023-03-10 NOTE — PROGRESS NOTES
Please call patient    Reviewed ECHO in detail with Dr Hernandez (EP specialist)    EF improved to 37%- still worrisome since she has had recurrent drop in EF previously.     Would like to have her wear 48 hour holter monitor then decide on removal of lifevest.   Decide on return to work after holter monitor  RTC in 1 month     Thanks  Sara

## 2023-03-10 NOTE — TELEPHONE ENCOUNTER
----- Message from JETT Mcginnis sent at 3/10/2023  8:24 AM CST -----  Please call patient    Reviewed ECHO in detail with Dr Hernandez (EP specialist)    EF improved to 37%- still worrisome since she has had recurrent drop in EF previously.     Would like to have her wear 48 hour holter monitor then decide on removal of lifevest.   Decide on return to work after holter monitor  RTC in 1 month     Thanks  Sara

## 2023-03-15 ENCOUNTER — HOSPITAL ENCOUNTER (OUTPATIENT)
Dept: CARDIOLOGY | Facility: HOSPITAL | Age: 58
Discharge: HOME | End: 2023-03-15
Attending: NURSE PRACTITIONER
Payer: COMMERCIAL

## 2023-03-15 DIAGNOSIS — I50.9 CHF (NYHA CLASS II, ACC/AHA STAGE C): ICD-10-CM

## 2023-03-15 PROCEDURE — 93226 XTRNL ECG REC<48 HR SCAN A/R: CPT

## 2023-03-15 PROCEDURE — 93227 XTRNL ECG REC<48 HR R&I: CPT | Mod: ,,, | Performed by: STUDENT IN AN ORGANIZED HEALTH CARE EDUCATION/TRAINING PROGRAM

## 2023-03-15 PROCEDURE — 93227 HOLTER MONITOR - 48 HOUR (CUPID ONLY): ICD-10-PCS | Mod: ,,, | Performed by: STUDENT IN AN ORGANIZED HEALTH CARE EDUCATION/TRAINING PROGRAM

## 2023-03-19 LAB
OHS CV EVENT MONITOR DAY: 0
OHS CV HOLTER LENGTH DECIMAL HOURS: 48
OHS CV HOLTER LENGTH HOURS: 48
OHS CV HOLTER LENGTH MINUTES: 0
OHS CV HOLTER SINUS AVERAGE HR: 74
OHS CV HOLTER SINUS MAX HR: 132
OHS CV HOLTER SINUS MIN HR: 53

## 2023-04-24 ENCOUNTER — OFFICE VISIT (OUTPATIENT)
Dept: CARDIOLOGY | Facility: CLINIC | Age: 58
End: 2023-04-24
Payer: COMMERCIAL

## 2023-04-24 VITALS
BODY MASS INDEX: 22.07 KG/M2 | HEART RATE: 70 BPM | RESPIRATION RATE: 16 BRPM | WEIGHT: 124.56 LBS | OXYGEN SATURATION: 95 % | DIASTOLIC BLOOD PRESSURE: 80 MMHG | HEIGHT: 63 IN | SYSTOLIC BLOOD PRESSURE: 130 MMHG

## 2023-04-24 DIAGNOSIS — I42.0 CARDIOMYOPATHY, DILATED, NONISCHEMIC: ICD-10-CM

## 2023-04-24 DIAGNOSIS — I50.22 CHRONIC SYSTOLIC HEART FAILURE, ACC/AHA STAGE C: ICD-10-CM

## 2023-04-24 DIAGNOSIS — I10 ESSENTIAL HYPERTENSION: ICD-10-CM

## 2023-04-24 DIAGNOSIS — Z72.0 TOBACCO ABUSE: ICD-10-CM

## 2023-04-24 DIAGNOSIS — I50.9 CHF (NYHA CLASS II, ACC/AHA STAGE C): Primary | ICD-10-CM

## 2023-04-24 DIAGNOSIS — I49.3 PVC (PREMATURE VENTRICULAR CONTRACTION): ICD-10-CM

## 2023-04-24 PROCEDURE — 4010F PR ACE/ARB THEARPY RXD/TAKEN: ICD-10-PCS | Mod: CPTII,S$GLB,, | Performed by: NURSE PRACTITIONER

## 2023-04-24 PROCEDURE — 4010F ACE/ARB THERAPY RXD/TAKEN: CPT | Mod: CPTII,S$GLB,, | Performed by: NURSE PRACTITIONER

## 2023-04-24 PROCEDURE — 99214 OFFICE O/P EST MOD 30 MIN: CPT | Mod: S$GLB,,, | Performed by: NURSE PRACTITIONER

## 2023-04-24 PROCEDURE — 3075F SYST BP GE 130 - 139MM HG: CPT | Mod: CPTII,S$GLB,, | Performed by: NURSE PRACTITIONER

## 2023-04-24 PROCEDURE — 99999 PR PBB SHADOW E&M-EST. PATIENT-LVL V: ICD-10-PCS | Mod: PBBFAC,,, | Performed by: NURSE PRACTITIONER

## 2023-04-24 PROCEDURE — 3008F BODY MASS INDEX DOCD: CPT | Mod: CPTII,S$GLB,, | Performed by: NURSE PRACTITIONER

## 2023-04-24 PROCEDURE — 99214 PR OFFICE/OUTPT VISIT, EST, LEVL IV, 30-39 MIN: ICD-10-PCS | Mod: S$GLB,,, | Performed by: NURSE PRACTITIONER

## 2023-04-24 PROCEDURE — 3079F PR MOST RECENT DIASTOLIC BLOOD PRESSURE 80-89 MM HG: ICD-10-PCS | Mod: CPTII,S$GLB,, | Performed by: NURSE PRACTITIONER

## 2023-04-24 PROCEDURE — 1159F MED LIST DOCD IN RCRD: CPT | Mod: CPTII,S$GLB,, | Performed by: NURSE PRACTITIONER

## 2023-04-24 PROCEDURE — 3008F PR BODY MASS INDEX (BMI) DOCUMENTED: ICD-10-PCS | Mod: CPTII,S$GLB,, | Performed by: NURSE PRACTITIONER

## 2023-04-24 PROCEDURE — 3079F DIAST BP 80-89 MM HG: CPT | Mod: CPTII,S$GLB,, | Performed by: NURSE PRACTITIONER

## 2023-04-24 PROCEDURE — 99999 PR PBB SHADOW E&M-EST. PATIENT-LVL V: CPT | Mod: PBBFAC,,, | Performed by: NURSE PRACTITIONER

## 2023-04-24 PROCEDURE — 3075F PR MOST RECENT SYSTOLIC BLOOD PRESS GE 130-139MM HG: ICD-10-PCS | Mod: CPTII,S$GLB,, | Performed by: NURSE PRACTITIONER

## 2023-04-24 PROCEDURE — 1159F PR MEDICATION LIST DOCUMENTED IN MEDICAL RECORD: ICD-10-PCS | Mod: CPTII,S$GLB,, | Performed by: NURSE PRACTITIONER

## 2023-04-24 RX ORDER — DAPAGLIFLOZIN 10 MG/1
10 TABLET, FILM COATED ORAL DAILY
Qty: 30 TABLET | Refills: 11 | Status: SHIPPED | OUTPATIENT
Start: 2023-04-24 | End: 2023-09-19 | Stop reason: SDUPTHER

## 2023-04-24 RX ORDER — SACUBITRIL AND VALSARTAN 24; 26 MG/1; MG/1
1 TABLET, FILM COATED ORAL 2 TIMES DAILY
Qty: 60 TABLET | Refills: 11 | Status: SHIPPED | OUTPATIENT
Start: 2023-04-24 | End: 2023-04-27

## 2023-04-24 NOTE — PROGRESS NOTES
Subjective:   Patient ID:  Melissa Petty is a 58 y.o. female who presents for evaluation of Congestive Heart Failure and Hypertension        HPI    Melissa Petty is a 57 year old female who presents to [facility] for 6 month follow up. Her current medical conditions include DCM, HFmrEF of 45%, PVCs, COPD,h/o covid,(9/2021) smoker. Shee returns today and states she is doing ok.       She has been using her ibuprofen more regularly due to right knee pain.   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     Has been having to use her lasix regularly due to worsening shortness of breath episodes.       Denies chest pain or anginal equivalents. Reports regular walking without any issues lately. NO leg swelling or claudications. No recent falls, syncope or near syncopal events. Reports compliance with medications and dietary restrictions. NO CNS complaints to suggest TIA or CVA today. No signs of abnormal bleeding.     Still smoking 1 ppd.       12/15/2022 update    Melissa Petty returns for 2 week follow up.     She reports significant improvement in CHF symptoms since med adjustments after last visit. Wearing LifeVest since last visit without any firing.     Still continues to smoke but has cut back since last visit.     Last 2 days, she has had noted improvement in shortness of breath. Denies orthopnea, PND or abdominal bloating. Has lost 6 pounds since last visit.     BP had dropped last week and ACEi has been on hold since that time. BP improved with med adjustments. No leg swelling on exam today.     Long discussion regarding CHF treatment plan. CHF educational booklet provided to patient today.     [place] paperwork completed today, tentative date on return to work will be late March or early April.     1/6/2023 update    Melissa Petty returns for follow up.     She is doing well with med adjustments at last visit.     Weight is stable today. Still has some shortness of breath with  exertion but is much improved from initial visit with me.     BP is much improved since med adjustments at last visit.     Tolerating Bisoprolol 5 mg daily without any cardiac side effects.     No chest pain or anginal equivalents.   Today, she is NYHA Stage C FC II.     Will add Entresto 24/26 nightly starting today. Denies orthopnea, PND or abdominal bloating. No leg swelling on exam today.     1/20/2023 update    Melissa Petty returns for 2 week CHF follow up. She continues to improve with each visit and GDMT titration. Tolerating Entresto without any side effects. BP well controlled. Has not used any PRN torsemide since last visit. Wearing LifeVest without any firing.     Has not returned to work on FMLA leave at this time.     NO chest pain or anginal equivalents. Doing well with limiting salt and fluid intake recently. Weight is stable today in office. BP much improved today.     Still has some degree of shortness of breath when she exerts herself. But able to complete regular tasks without any shortness of breath or fatigue. Denies any bendopnea symptoms. No leg swelling on exam today.     2/3/2023 update    She returns today and states she is doing well.     Denies any CHF symptoms since last visit. Has not used her Torsemide in the past 2 weeks. BP well controlled today.     No chest pain or anginal equivalents. No leg swelling on exam today.     Weight is stable today. Labs pending.    No shortness of breath, PAINTER or palpitations. Denies orthopnea, PND or abdominal bloating.     Doing well with salt and fluid restrictions since last visit.   Tolerating medications without any side effects offered today in clinic.     3/9/2023 update    Melissa Petty returns for CHF follow up and ECHO to update LVEF with GDMT.     ECHO pending.     Has been doing well CHF wise. NO chest pain or anginal equivalents. Denies shortness of breath, PAINTER or palpitations. NO leg swelling on exam today.     Has been doing well  with salt restrictions.     Tolerating medications without any issues today.     Await echo results to decide next steps.     4/24/2023 update    Melissa Petty returns for 1 month CHF follow up. She has returned to work 5 days a week without any cardiac complaints. Denies any shortness of breath, PAINTER or palpitations. NO chest pain or anginal equivalents.     Overall, she feels great today in office. Has not used any Torsemide in quite some time. NO leg swelling on exam.     Reports compliance with medications and dietary restrictions.     No dizziness, LH, Syncope or near syncopal events.     Past Medical History:   Diagnosis Date    Cardiomyopathy     CHF (congestive heart failure)     COPD exacerbation 5/26/2020    Hypertension        Past Surgical History:   Procedure Laterality Date    JOINT REPLACEMENT         Social History     Tobacco Use    Smoking status: Every Day     Packs/day: 1.00     Years: 32.00     Pack years: 32.00     Types: Cigarettes    Smokeless tobacco: Never   Substance Use Topics    Alcohol use: Yes     Comment: Occassionally    Drug use: No       Family History   Problem Relation Age of Onset    Heart failure Mother     Hypertension Brother      Wt Readings from Last 3 Encounters:   04/24/23 56.5 kg (124 lb 9 oz)   03/09/23 54.9 kg (121 lb)   03/09/23 54.1 kg (119 lb 4.3 oz)     Temp Readings from Last 3 Encounters:   09/27/21 97.7 °F (36.5 °C)   09/25/21 98.5 °F (36.9 °C) (Oral)   09/25/21 98.5 °F (36.9 °C) (Oral)     BP Readings from Last 3 Encounters:   04/24/23 130/80   03/09/23 122/80   03/09/23 118/60     Pulse Readings from Last 3 Encounters:   04/24/23 70   03/09/23 78   03/09/23 72     Current Outpatient Medications on File Prior to Visit   Medication Sig Dispense Refill    albuterol (PROVENTIL/VENTOLIN HFA) 90 mcg/actuation inhaler INHALE 1 TO 2 PUFFS BY MOUTH EVERY 6 HOURS AS NEEDED FOR WHEEZING (RESCUE) 9 g 0    aspirin (ECOTRIN) 81 MG EC tablet Take 1 tablet (81 mg total) by  mouth once daily. 30 tablet 6    bisoprolol (ZEBETA) 5 MG tablet Take 1 tablet (5 mg total) by mouth 2 (two) times a day. 60 tablet 11    folic acid (FOLVITE) 800 MCG Tab Take 400 mcg by mouth once daily.      naproxen (NAPROSYN) 500 MG tablet Take 1 tablet by mouth 2 (two) times daily.      thiamine 100 MG tablet Take 250 mg by mouth once daily.      torsemide (DEMADEX) 10 MG Tab Take 1 tablet (10 mg total) by mouth once daily. 30 tablet 11    [DISCONTINUED] FARXIGA 10 mg tablet Take 1 tablet by mouth once daily 30 tablet 0    [DISCONTINUED] sacubitriL-valsartan (ENTRESTO) 24-26 mg per tablet Take 1 tablet by mouth 2 (two) times daily. 60 tablet 3    fluticasone propion-salmeterol 115-21 mcg/dose (ADVAIR HFA) 115-21 mcg/actuation HFAA inhaler Inhale 2 puffs into the lungs every 12 (twelve) hours. Controller 12 g 0     Current Facility-Administered Medications on File Prior to Visit   Medication Dose Route Frequency Provider Last Rate Last Admin    acetaminophen tablet 650 mg  650 mg Oral Once PRN Jaspreet Lerma MD        albuterol inhaler 2 puff  2 puff Inhalation Q20 Min PRN Jaspreet Lerma MD        diphenhydrAMINE injection 25 mg  25 mg Intravenous Once PRN Jaspreet Lerma MD        EPINEPHrine (EPIPEN) 0.3 mg/0.3 mL pen injection 0.3 mg  0.3 mg Intramuscular PRN Jaspreet Lerma MD        methylPREDNISolone sodium succinate injection 40 mg  40 mg Intravenous Once PRN Jaspreet Lerma MD        ondansetron disintegrating tablet 4 mg  4 mg Oral Once PRN Jaspreet Lerma MD        sodium chloride 0.9% 500 mL flush bag   Intravenous PRN Jaspreet Lerma MD        sodium chloride 0.9% flush 10 mL  10 mL Intravenous PRN Jaspreet Lerma MD           Review of Systems   Constitutional: Positive for malaise/fatigue.   HENT:  Negative for hearing loss and hoarse voice.    Eyes:  Negative for blurred vision and visual disturbance.   Cardiovascular:  Positive for dyspnea on exertion. Negative for chest pain,  "claudication, irregular heartbeat, leg swelling, near-syncope, orthopnea, palpitations, paroxysmal nocturnal dyspnea and syncope.   Respiratory:  Negative for cough, hemoptysis, shortness of breath, sleep disturbances due to breathing, snoring and wheezing.    Endocrine: Negative for cold intolerance and heat intolerance.   Hematologic/Lymphatic: Does not bruise/bleed easily.   Skin:  Negative for color change, dry skin and nail changes.   Musculoskeletal:  Positive for arthritis and back pain. Negative for joint pain and myalgias.   Gastrointestinal:  Negative for bloating, abdominal pain, constipation, nausea and vomiting.   Genitourinary:  Negative for dysuria, flank pain, hematuria and hesitancy.   Neurological:  Negative for headaches, light-headedness, loss of balance, numbness, paresthesias and weakness.   Psychiatric/Behavioral:  Negative for altered mental status.    Allergic/Immunologic: Negative for environmental allergies.     Objective:/80   Pulse 70   Resp 16   Ht 5' 3" (1.6 m)   Wt 56.5 kg (124 lb 9 oz)   SpO2 95%   BMI 22.06 kg/m²      Physical Exam  Vitals and nursing note reviewed.   Constitutional:       General: She is not in acute distress.     Appearance: Normal appearance. She is well-developed. She is not ill-appearing.   HENT:      Head: Normocephalic and atraumatic.      Nose: Nose normal.      Mouth/Throat:      Mouth: Mucous membranes are moist.   Eyes:      Pupils: Pupils are equal, round, and reactive to light.   Neck:      Thyroid: No thyromegaly.      Vascular: No JVD.      Trachea: No tracheal deviation.   Cardiovascular:      Rate and Rhythm: Normal rate and regular rhythm.      Chest Wall: PMI is not displaced.      Pulses: Intact distal pulses.           Radial pulses are 2+ on the right side and 2+ on the left side.        Dorsalis pedis pulses are 2+ on the right side and 2+ on the left side.      Heart sounds: S1 normal and S2 normal. Heart sounds not distant. No " murmur heard.  Pulmonary:      Effort: Pulmonary effort is normal. No respiratory distress.      Breath sounds: Normal breath sounds. No wheezing.   Abdominal:      General: Bowel sounds are normal. There is no distension.      Palpations: Abdomen is soft.      Tenderness: There is no abdominal tenderness.   Musculoskeletal:         General: No swelling. Normal range of motion.      Cervical back: Full passive range of motion without pain, normal range of motion and neck supple.      Right ankle: No swelling.      Left ankle: No swelling.   Skin:     General: Skin is warm and dry.      Capillary Refill: Capillary refill takes less than 2 seconds.      Nails: There is no clubbing.   Neurological:      General: No focal deficit present.      Mental Status: She is alert and oriented to person, place, and time.   Psychiatric:         Speech: Speech normal.         Behavior: Behavior normal.         Thought Content: Thought content normal.         Judgment: Judgment normal.       Lab Results   Component Value Date    CHOL 153 05/26/2021     Lab Results   Component Value Date    HDL 52 05/26/2021     Lab Results   Component Value Date    LDLCALC 88.4 05/26/2021     Lab Results   Component Value Date    TRIG 63 05/26/2021     Lab Results   Component Value Date    CHOLHDL 34.0 05/26/2021       Chemistry        Component Value Date/Time     02/03/2023 0852    K 4.2 02/03/2023 0852     02/03/2023 0852    CO2 24 02/03/2023 0852    BUN 10 02/03/2023 0852    CREATININE 0.7 02/03/2023 0852    GLU 82 02/03/2023 0852        Component Value Date/Time    CALCIUM 9.9 02/03/2023 0852    ALKPHOS 91 11/30/2022 1004    AST 26 11/30/2022 1004    ALT 20 11/30/2022 1004    BILITOT 1.1 (H) 11/30/2022 1004    ESTGFRAFRICA >60.0 03/03/2022 1508    EGFRNONAA >60.0 03/03/2022 1508          Lab Results   Component Value Date    TSH 0.332 (L) 05/31/2013     Lab Results   Component Value Date    INR 1.1 05/31/2013     Lab Results    Component Value Date    WBC 3.52 (L) 09/25/2021    HGB 17.6 (H) 09/25/2021    HCT 53.5 (H) 09/25/2021    MCV 99 (H) 09/25/2021     (L) 09/25/2021        Assessment:      1. CHF (NYHA class II, ACC/AHA stage C)    2. Chronic systolic heart failure, ACC/AHA stage C    3. Essential hypertension    4. Cardiomyopathy, dilated, nonischemic    5. PVC (premature ventricular contraction)    6. Tobacco abuse              Plan:       CHF SYNOPSIS:    GDMT:  ACE/ARB/ARNI: Entresto 49/51 BID   Beta Blocker: Bisoprolol 5 mg BID  MRA: on hold  SGLT2: Farxiga 10 mg daily  Diuretic: Torsemide 10 mg daily PRN    RTC In 3 months    Nicole May, FNP-C [facility]

## 2023-04-26 NOTE — PROGRESS NOTES
Diagnosis: Rectal  Cancer   Regimen: Folfiri -Leuco + Avastin  Cycle/Day: C10 D1  Is this a C1D1 appt?  No    Ana Lilia Caputo NP is supervising clinician today.    ECO  ECOG [23 1202]   ECOG Performance Status 1       Review and verified Advanced Directives: No: Patient declined to create/provide document at this time     Verified if patient has state DNR bracelet on: No; Full Code    Nursing Assessment:   A focused nursing assessment addressing the toxicity of chemotherapy was performed and the patient reports the following:        Toxicity Assessment    Adverse Events?  Adverse Events: No    Auditory/Ear  Assessment: Yes (Within Defined Limits)    Cardiac General  Assessment: Yes (Within Defined Limits)    Constitutional  Assessment: Yes (w/ Exceptions to WDL)  Fatigue: Grade 1    Dermatology/Skin  Assessment: Yes (w/ Exceptions to WDL)  Alopecia: Grade 2    Endocrine  Assessment: Yes (Within Defined Limits)    Gastrointestinal  Assessment: Yes (w/ Exceptions to WDL)  Anorexia: Grade 1  Nausea: Grade 1    Hemorrhage/Bleeding  Assessment: Yes (Within Defined Limits)    Infection  Assessment: Yes (Within Defined Limits)    Lymphatics  Assessment: Yes (Within Defined Limits)    Musculoskeletal  Assessment: Yes (w/ Exceptions to WDL)  Generalized Muscle Weakness: Grade 1    Neurology  Assessment: Yes (Within Defined Limits)    Ocular  Assessment: Yes (Within Defined Limits)    Pain  Assessment: Yes (Within Defined Limits)    Pulmonary/Upper Respiratory  Assessment: Yes (Within Defined Limits)    Genitourinary  Assessment: Yes (Within Defined Limits)        Patient confirms receipt of a signed copy of Anti-Cancer Treatment consent and verbalizes understanding of treatment plan: Yes.     Pre-Treatment: Treatment consent signed  Patient has valid pre-authorization  VS completed  Height and weight verified  BSA independently double checked & verified by two practitioners  Premed orders, including hydration, are  Subjective:   Patient ID:  Melissa Petty is a 57 y.o. female who presents for evaluation of chronic systolic heart failure        HPI    Melissa Petty is a 57 year old female who presents to Arrhythmia clinic for 6 month follow up. Her current medical conditions include DCM, HFmrEF of 45%, PVCs, COPD,h/o covid,(9/2021) smoker. Shee returns today and states she is doing ok.       She has been using her ibuprofen more regularly due to right knee pain.   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     Has been having to use her lasix regularly due to worsening shortness of breath episodes.       Denies chest pain or anginal equivalents. Reports regular walking without any issues lately. NO leg swelling or claudications. No recent falls, syncope or near syncopal events. Reports compliance with medications and dietary restrictions. NO CNS complaints to suggest TIA or CVA today. No signs of abnormal bleeding.     Still smoking 1 ppd.       12/15/2022 update    Melissa Petty returns for 2 week follow up.     She reports significant improvement in CHF symptoms since med adjustments after last visit. Wearing LifeVest since last visit without any firing.     Still continues to smoke but has cut back since last visit.     Last 2 days, she has had noted improvement in shortness of breath. Denies orthopnea, PND or abdominal bloating. Has lost 6 pounds since last visit.     BP had dropped last week and ACEi has been on hold since that time. BP improved with med adjustments. No leg swelling on exam today.     Long discussion regarding CHF treatment plan. CHF educational booklet provided to patient today.     [place] paperwork completed today, tentative date on return to work will be late March or early April.     1/6/2023    Melissa Petty returns for follow up.     She is doing well with med adjustments at last visit.     Weight is stable today. Still has some shortness of breath with exertion but is much  improved from initial visit with me.     BP is much improved since med adjustments at last visit.     Tolerating Bisoprolol 5 mg daily without any cardiac side effects.     No chest pain or anginal equivalents.   Today, she is NYHA Stage C FC II.     Will add Entresto 24/26 nightly starting today. Denies orthopnea, PND or abdominal bloating. No leg swelling on exam today.     Past Medical History:   Diagnosis Date    Cardiomyopathy     CHF (congestive heart failure)     COPD exacerbation 5/26/2020    Hypertension        Past Surgical History:   Procedure Laterality Date    JOINT REPLACEMENT         Social History     Tobacco Use    Smoking status: Every Day     Packs/day: 1.00     Years: 32.00     Pack years: 32.00     Types: Cigarettes    Smokeless tobacco: Never   Substance Use Topics    Alcohol use: Yes     Comment: Occassionally    Drug use: No       Family History   Problem Relation Age of Onset    Heart failure Mother     Hypertension Brother      Wt Readings from Last 3 Encounters:   01/06/23 54 kg (119 lb 0.8 oz)   12/15/22 53.6 kg (118 lb 2.7 oz)   11/30/22 56.2 kg (124 lb)     Temp Readings from Last 3 Encounters:   09/27/21 97.7 °F (36.5 °C)   09/25/21 98.5 °F (36.9 °C) (Oral)   09/25/21 98.5 °F (36.9 °C) (Oral)     BP Readings from Last 3 Encounters:   01/06/23 116/77   12/15/22 108/76   11/30/22 120/80     Pulse Readings from Last 3 Encounters:   01/06/23 70   12/15/22 80   11/30/22 78     Current Outpatient Medications on File Prior to Visit   Medication Sig Dispense Refill    aspirin (ECOTRIN) 81 MG EC tablet Take 1 tablet (81 mg total) by mouth once daily. 30 tablet 6    bisoprolol (ZEBETA) 5 MG tablet Take 1 tablet (5 mg total) by mouth once daily. 30 tablet 11    dapagliflozin (FARXIGA) 10 mg tablet Take 1 tablet (10 mg total) by mouth once daily. 30 tablet 3    folic acid (FOLVITE) 800 MCG Tab Take 400 mcg by mouth once daily.      thiamine 100 MG tablet Take 250 mg by mouth once daily.       verified prior to administration  Treatment parameters verified in patient protocol  Lab results checked - MD notified; WNl  Chemotherapy doses independently doubled checked & verified by two practitioners    Treatment: I have reviewed the following with the patient:  Name of chemo drug, duration and route of infusion, and reportable infusion-related symptoms.  Chemotherapy has not ; double checked & verified by two practitioners  Appearance and physical integrity of drugs meets standard of drug monograph; double checked & verified by two practitioners  Rate set on infusion pump is in alignment with ordered rate; double checked & verified by two practitioners  Blood return confirmed before, during and after treatment administered  Infusion pump used for non-vesicant drugs  Drugs were administered in proper sequencing  Refer to LDA and MAR for line assessment and medication administration  Home ambulatory pump on discharge. 5FU for continuous infusion: Infusion pump provided by [employer], medication supplied and pump connected by [facility].    Post Treatment: Treatment tolerated well; no adverse reaction    Transfusion: Not needed    Integrative Medicine: No    Oral Chemotherapy: No    Education: No new instructions needed    Next appointment scheduled: - Pump Off/Neulasta   Patient instructed to call the office with any questions or concerns.    Patient Discharged: with family member, to home    Administrations This Visit     beVACizumab-awwb (MVASI) 500 mg in sodium chloride 0.9 % 100 mL chemo infusion     Admin Date  2023 Action  New Bag Dose  500 mg Rate  780 mL/hr Route  Intravenous Administered By  Brandie Torres, RN          fluorouracil (ADRUCIL) 5,400 mg continuous chemo infusion pump     Admin Date  2023 Action  Given Dose  5,400 mg Rate  2.3 mL/hr Route  Intravenous (Continuous Infusion) Administered By  Brandie Torres, RN          irinotecan (CAMPTOSAR) 400 mg in sodium  chloride 0.9 % 500 mL chemo infusion     Admin Date  04/26/2023 Action  New Bag Dose  400 mg Rate  373.3 mL/hr Route  Intravenous Administered By  Brandie Torres, RN          ondansetron 16 mg-dexamethasone 12 mg in sodium chloride 0.9% PREMIX 100 mL     Admin Date  04/26/2023 Action  New Bag Dose  100 mL Route  Intravenous Administered By  Brandie Torres, RN          sodium chloride 0.9 % flush bag 250 mL     Admin Date  04/26/2023 Action  New Bag Dose  250 mL Rate  50 mL/hr Route  Intravenous Administered By  Brandie Torres, RN                  Fall risk 35  (Carrillo Fall Risk)    Distress Tool   N/A at this time       torsemide (DEMADEX) 10 MG Tab Take 1 tablet (10 mg total) by mouth once daily. 30 tablet 11    albuterol (PROVENTIL/VENTOLIN HFA) 90 mcg/actuation inhaler INHALE 1 TO 2 PUFFS BY MOUTH EVERY 6 HOURS AS NEEDED FOR WHEEZING (RESCUE) (Patient not taking: Reported on 1/6/2023) 9 g 0    fluticasone propion-salmeterol 115-21 mcg/dose (ADVAIR HFA) 115-21 mcg/actuation HFAA inhaler Inhale 2 puffs into the lungs every 12 (twelve) hours. Controller 12 g 0    naproxen (NAPROSYN) 500 MG tablet Take 1 tablet by mouth 2 (two) times daily.      [DISCONTINUED] lisinopriL 10 MG tablet Take 1 tablet by mouth twice daily (Patient not taking: Reported on 12/15/2022) 60 tablet 0     Current Facility-Administered Medications on File Prior to Visit   Medication Dose Route Frequency Provider Last Rate Last Admin    acetaminophen tablet 650 mg  650 mg Oral Once PRN Jaspreet Lerma MD        albuterol inhaler 2 puff  2 puff Inhalation Q20 Min PRN Jaspreet Lerma MD        diphenhydrAMINE injection 25 mg  25 mg Intravenous Once PRN Jaspreet Lerma MD        EPINEPHrine (EPIPEN) 0.3 mg/0.3 mL pen injection 0.3 mg  0.3 mg Intramuscular PRN Jaspreet Lerma MD        methylPREDNISolone sodium succinate injection 40 mg  40 mg Intravenous Once PRN Jaspreet Lerma MD        ondansetron disintegrating tablet 4 mg  4 mg Oral Once PRN Jaspreet Lerma MD        sodium chloride 0.9% 500 mL flush bag   Intravenous PRN Jaspreet Lerma MD        sodium chloride 0.9% flush 10 mL  10 mL Intravenous PRN Jaspreet Lerma MD           Review of Systems   Constitutional: Positive for malaise/fatigue.   HENT:  Negative for hearing loss and hoarse voice.    Eyes:  Negative for blurred vision and visual disturbance.   Cardiovascular:  Positive for dyspnea on exertion. Negative for chest pain, claudication, irregular heartbeat, leg swelling, near-syncope, orthopnea, palpitations, paroxysmal nocturnal dyspnea and syncope.   Respiratory:  Negative for  "cough, hemoptysis, shortness of breath, sleep disturbances due to breathing, snoring and wheezing.    Endocrine: Negative for cold intolerance and heat intolerance.   Hematologic/Lymphatic: Does not bruise/bleed easily.   Skin:  Negative for color change, dry skin and nail changes.   Musculoskeletal:  Positive for arthritis and back pain. Negative for joint pain and myalgias.   Gastrointestinal:  Negative for bloating, abdominal pain, constipation, nausea and vomiting.   Genitourinary:  Negative for dysuria, flank pain, hematuria and hesitancy.   Neurological:  Negative for headaches, light-headedness, loss of balance, numbness, paresthesias and weakness.   Psychiatric/Behavioral:  Negative for altered mental status.    Allergic/Immunologic: Negative for environmental allergies.     Objective:/77   Pulse 70   Resp 16   Ht 5' 3" (1.6 m)   Wt 54 kg (119 lb 0.8 oz)   SpO2 96%   BMI 21.09 kg/m²      Physical Exam  Vitals and nursing note reviewed.   Constitutional:       General: She is not in acute distress.     Appearance: Normal appearance. She is well-developed. She is not ill-appearing.   HENT:      Head: Normocephalic and atraumatic.      Nose: Nose normal.      Mouth/Throat:      Mouth: Mucous membranes are moist.   Eyes:      Pupils: Pupils are equal, round, and reactive to light.   Neck:      Thyroid: No thyromegaly.      Vascular: No JVD.      Trachea: No tracheal deviation.   Cardiovascular:      Rate and Rhythm: Normal rate and regular rhythm.      Chest Wall: PMI is not displaced.      Pulses: Intact distal pulses.           Radial pulses are 2+ on the right side and 2+ on the left side.        Dorsalis pedis pulses are 2+ on the right side and 2+ on the left side.      Heart sounds: S1 normal and S2 normal. Heart sounds not distant. No murmur heard.  Pulmonary:      Effort: Pulmonary effort is normal. No respiratory distress.      Breath sounds: Normal breath sounds. No wheezing.   Abdominal: "      General: Bowel sounds are normal. There is no distension.      Palpations: Abdomen is soft.      Tenderness: There is no abdominal tenderness.   Musculoskeletal:         General: No swelling. Normal range of motion.      Cervical back: Full passive range of motion without pain, normal range of motion and neck supple.      Right ankle: No swelling.      Left ankle: No swelling.   Skin:     General: Skin is warm and dry.      Capillary Refill: Capillary refill takes less than 2 seconds.      Nails: There is no clubbing.   Neurological:      General: No focal deficit present.      Mental Status: She is alert and oriented to person, place, and time.   Psychiatric:         Speech: Speech normal.         Behavior: Behavior normal.         Thought Content: Thought content normal.         Judgment: Judgment normal.       Lab Results   Component Value Date    CHOL 153 05/26/2021     Lab Results   Component Value Date    HDL 52 05/26/2021     Lab Results   Component Value Date    LDLCALC 88.4 05/26/2021     Lab Results   Component Value Date    TRIG 63 05/26/2021     Lab Results   Component Value Date    CHOLHDL 34.0 05/26/2021       Chemistry        Component Value Date/Time     12/15/2022 0901    K 5.0 12/15/2022 0901     12/15/2022 0901    CO2 27 12/15/2022 0901    BUN 14 12/15/2022 0901    CREATININE 0.7 12/15/2022 0901    GLU 88 12/15/2022 0901        Component Value Date/Time    CALCIUM 9.8 12/15/2022 0901    ALKPHOS 91 11/30/2022 1004    AST 26 11/30/2022 1004    ALT 20 11/30/2022 1004    BILITOT 1.1 (H) 11/30/2022 1004    ESTGFRAFRICA >60.0 03/03/2022 1508    EGFRNONAA >60.0 03/03/2022 1508          Lab Results   Component Value Date    TSH 0.332 (L) 05/31/2013     Lab Results   Component Value Date    INR 1.1 05/31/2013     Lab Results   Component Value Date    WBC 3.52 (L) 09/25/2021    HGB 17.6 (H) 09/25/2021    HCT 53.5 (H) 09/25/2021    MCV 99 (H) 09/25/2021     (L) 09/25/2021         Assessment:      1. Chronic systolic heart failure, ACC/AHA stage C    2. CHF (NYHA class II, ACC/AHA stage C)    3. Essential hypertension    4. Cardiomyopathy, dilated, nonischemic    5. PVC (premature ventricular contraction)    6. Tobacco abuse            Plan:     CHF SYNOPSIS:    GDMT:  ACE/ARB/ARNI: Start Entresto 24/26 nightly  Beta Blocker: Bisoprolol 5 mg daily  MRA: on hold  SGLT2: Farxiga 10 mg daily  Diuretic: Torsemide 10 mg daily PRN    Daily weights  Profile BP at home  Ok to use Torsemide PRN for shortness of breath, leg swelling or weight gain of 3 pounds overnight or 5 pounds in 1 week  Call if any issues prior to next visit  RTC in 2 weeks with BMP BNP      Nicole May, FNP-C [facility]

## 2023-04-27 DIAGNOSIS — I50.9 CHF (NYHA CLASS II, ACC/AHA STAGE C): Primary | ICD-10-CM

## 2023-04-27 DIAGNOSIS — I50.22 CHRONIC SYSTOLIC HEART FAILURE, ACC/AHA STAGE C: ICD-10-CM

## 2023-04-27 RX ORDER — SACUBITRIL AND VALSARTAN 49; 51 MG/1; MG/1
1 TABLET, FILM COATED ORAL 2 TIMES DAILY
Qty: 60 TABLET | Refills: 11 | Status: SHIPPED | OUTPATIENT
Start: 2023-04-27 | End: 2024-04-02

## 2023-07-21 DIAGNOSIS — I48.0 PAROXYSMAL ATRIAL FIBRILLATION: Primary | ICD-10-CM

## 2023-08-02 ENCOUNTER — OFFICE VISIT (OUTPATIENT)
Dept: CARDIOLOGY | Facility: CLINIC | Age: 58
End: 2023-08-02
Payer: COMMERCIAL

## 2023-08-02 ENCOUNTER — HOSPITAL ENCOUNTER (OUTPATIENT)
Dept: CARDIOLOGY | Facility: HOSPITAL | Age: 58
Discharge: HOME | End: 2023-08-02
Payer: COMMERCIAL

## 2023-08-02 VITALS
OXYGEN SATURATION: 95 % | RESPIRATION RATE: 16 BRPM | WEIGHT: 126.13 LBS | DIASTOLIC BLOOD PRESSURE: 80 MMHG | HEART RATE: 67 BPM | BODY MASS INDEX: 22.35 KG/M2 | HEIGHT: 63 IN | SYSTOLIC BLOOD PRESSURE: 124 MMHG

## 2023-08-02 DIAGNOSIS — I10 ESSENTIAL HYPERTENSION: ICD-10-CM

## 2023-08-02 DIAGNOSIS — I49.3 PVC (PREMATURE VENTRICULAR CONTRACTION): ICD-10-CM

## 2023-08-02 DIAGNOSIS — I42.0 CARDIOMYOPATHY, DILATED, NONISCHEMIC: ICD-10-CM

## 2023-08-02 DIAGNOSIS — I48.0 PAROXYSMAL ATRIAL FIBRILLATION: ICD-10-CM

## 2023-08-02 DIAGNOSIS — Z72.0 TOBACCO ABUSE: ICD-10-CM

## 2023-08-02 DIAGNOSIS — I50.9 CHF (NYHA CLASS II, ACC/AHA STAGE C): Primary | ICD-10-CM

## 2023-08-02 PROCEDURE — 3074F PR MOST RECENT SYSTOLIC BLOOD PRESSURE < 130 MM HG: ICD-10-PCS | Mod: CPTII,S$GLB,, | Performed by: NURSE PRACTITIONER

## 2023-08-02 PROCEDURE — 3079F PR MOST RECENT DIASTOLIC BLOOD PRESSURE 80-89 MM HG: ICD-10-PCS | Mod: CPTII,S$GLB,, | Performed by: NURSE PRACTITIONER

## 2023-08-02 PROCEDURE — 1159F MED LIST DOCD IN RCRD: CPT | Mod: CPTII,S$GLB,, | Performed by: NURSE PRACTITIONER

## 2023-08-02 PROCEDURE — 3008F BODY MASS INDEX DOCD: CPT | Mod: CPTII,S$GLB,, | Performed by: NURSE PRACTITIONER

## 2023-08-02 PROCEDURE — 3074F SYST BP LT 130 MM HG: CPT | Mod: CPTII,S$GLB,, | Performed by: NURSE PRACTITIONER

## 2023-08-02 PROCEDURE — 1159F PR MEDICATION LIST DOCUMENTED IN MEDICAL RECORD: ICD-10-PCS | Mod: CPTII,S$GLB,, | Performed by: NURSE PRACTITIONER

## 2023-08-02 PROCEDURE — 3008F PR BODY MASS INDEX (BMI) DOCUMENTED: ICD-10-PCS | Mod: CPTII,S$GLB,, | Performed by: NURSE PRACTITIONER

## 2023-08-02 PROCEDURE — 99214 PR OFFICE/OUTPT VISIT, EST, LEVL IV, 30-39 MIN: ICD-10-PCS | Mod: S$GLB,,, | Performed by: NURSE PRACTITIONER

## 2023-08-02 PROCEDURE — 4010F PR ACE/ARB THEARPY RXD/TAKEN: ICD-10-PCS | Mod: CPTII,S$GLB,, | Performed by: NURSE PRACTITIONER

## 2023-08-02 PROCEDURE — 99214 OFFICE O/P EST MOD 30 MIN: CPT | Mod: S$GLB,,, | Performed by: NURSE PRACTITIONER

## 2023-08-02 PROCEDURE — 99999 PR PBB SHADOW E&M-EST. PATIENT-LVL V: ICD-10-PCS | Mod: PBBFAC,,, | Performed by: NURSE PRACTITIONER

## 2023-08-02 PROCEDURE — 99999 PR PBB SHADOW E&M-EST. PATIENT-LVL V: CPT | Mod: PBBFAC,,, | Performed by: NURSE PRACTITIONER

## 2023-08-02 PROCEDURE — 3079F DIAST BP 80-89 MM HG: CPT | Mod: CPTII,S$GLB,, | Performed by: NURSE PRACTITIONER

## 2023-08-02 PROCEDURE — 4010F ACE/ARB THERAPY RXD/TAKEN: CPT | Mod: CPTII,S$GLB,, | Performed by: NURSE PRACTITIONER

## 2023-08-02 NOTE — PROGRESS NOTES
Subjective:   Patient ID:  Melissa Petty is a 58 y.o. female who presents for evaluation of Follow-up and Congestive Heart Failure        HPI    Melissa Petty is a 57 year old female who presents to Arrhythmia clinic for 6 month follow up. Her current medical conditions include DCM, HFmrEF of 45%, PVCs, COPD,h/o covid,(9/2021) smoker. Shee returns today and states she is doing ok.       She has been using her ibuprofen more regularly due to right knee pain.   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     Has been having to use her lasix regularly due to worsening shortness of breath episodes.       Denies chest pain or anginal equivalents. Reports regular walking without any issues lately. NO leg swelling or claudications. No recent falls, syncope or near syncopal events. Reports compliance with medications and dietary restrictions. NO CNS complaints to suggest TIA or CVA today. No signs of abnormal bleeding.     Still smoking 1 ppd.       12/15/2022 update    Melissa Petty returns for 2 week follow up.     She reports significant improvement in CHF symptoms since med adjustments after last visit. Wearing LifeVest since last visit without any firing.     Still continues to smoke but has cut back since last visit.     Last 2 days, she has had noted improvement in shortness of breath. Denies orthopnea, PND or abdominal bloating. Has lost 6 pounds since last visit.     BP had dropped last week and ACEi has been on hold since that time. BP improved with med adjustments. No leg swelling on exam today.     Long discussion regarding CHF treatment plan. CHF educational booklet provided to patient today.     [place] paperwork completed today, tentative date on return to work will be late March or early April.     1/6/2023 update    Melissa Petty returns for follow up.     She is doing well with med adjustments at last visit.     Weight is stable today. Still has some shortness of breath with exertion  but is much improved from initial visit with me.     BP is much improved since med adjustments at last visit.     Tolerating Bisoprolol 5 mg daily without any cardiac side effects.     No chest pain or anginal equivalents.   Today, she is NYHA Stage C FC II.     Will add Entresto 24/26 nightly starting today. Denies orthopnea, PND or abdominal bloating. No leg swelling on exam today.     1/20/2023 update    Melissa Petty returns for 2 week CHF follow up. She continues to improve with each visit and GDMT titration. Tolerating Entresto without any side effects. BP well controlled. Has not used any PRN torsemide since last visit. Wearing LifeVest without any firing.     Has not returned to work on FMLA leave at this time.     NO chest pain or anginal equivalents. Doing well with limiting salt and fluid intake recently. Weight is stable today in office. BP much improved today.     Still has some degree of shortness of breath when she exerts herself. But able to complete regular tasks without any shortness of breath or fatigue. Denies any bendopnea symptoms. No leg swelling on exam today.     2/3/2023 update    She returns today and states she is doing well.     Denies any CHF symptoms since last visit. Has not used her Torsemide in the past 2 weeks. BP well controlled today.     No chest pain or anginal equivalents. No leg swelling on exam today.     Weight is stable today. Labs pending.    No shortness of breath, PAINTER or palpitations. Denies orthopnea, PND or abdominal bloating.     Doing well with salt and fluid restrictions since last visit.   Tolerating medications without any side effects offered today in clinic.     3/9/2023 update    Melissa Petty returns for CHF follow up and ECHO to update LVEF with GDMT.     ECHO pending.     Has been doing well CHF wise. NO chest pain or anginal equivalents. Denies shortness of breath, PAINTER or palpitations. NO leg swelling on exam today.     Has been doing well with salt  restrictions.     Tolerating medications without any issues today.     Await echo results to decide next steps.     4/24/2023 update    Melissa Petty returns for 1 month CHF follow up. She has returned to work 5 days a week without any cardiac complaints. Denies any shortness of breath, PAINTER or palpitations. NO chest pain or anginal equivalents.     Overall, she feels great today in office. Has not used any Torsemide in quite some time. NO leg swelling on exam.     Reports compliance with medications and dietary restrictions.     No dizziness, LH, Syncope or near syncopal events.     8/2/2023 update    Melissa Petty returns for 3 month follow up.     She is doing well cardiac wise. Has not required any doses of Torsemide in the past 3 months. BP well controlled today.     She continues to work in the [employer] at [employer] which is physically taxing. NO shortness of breath, PAINTER or palpitations with exertional activities.     Reports compliance with medications and dietary restrictions.   Still smoking daily.     Main complaint is heat intolerance in excessive temperatures.   No leg swelling. Denies chest pain or anginal equivalents.       Past Medical History:   Diagnosis Date    Cardiomyopathy     CHF (congestive heart failure)     COPD exacerbation 5/26/2020    Hypertension        Past Surgical History:   Procedure Laterality Date    JOINT REPLACEMENT         Social History     Tobacco Use    Smoking status: Every Day     Current packs/day: 1.00     Average packs/day: 1 pack/day for 32.0 years (32.0 ttl pk-yrs)     Types: Cigarettes    Smokeless tobacco: Never   Substance Use Topics    Alcohol use: Yes     Comment: Occassionally    Drug use: No       Family History   Problem Relation Age of Onset    Heart failure Mother     Hypertension Brother      Wt Readings from Last 3 Encounters:   08/02/23 57.2 kg (126 lb 1.7 oz)   04/24/23 56.5 kg (124 lb 9 oz)   03/09/23 54.9 kg (121 lb)     Temp Readings from  Last 3 Encounters:   09/27/21 97.7 °F (36.5 °C)   09/25/21 98.5 °F (36.9 °C) (Oral)   09/25/21 98.5 °F (36.9 °C) (Oral)     BP Readings from Last 3 Encounters:   08/02/23 124/80   04/24/23 130/80   03/09/23 122/80     Pulse Readings from Last 3 Encounters:   08/02/23 67   04/24/23 70   03/09/23 78     Current Outpatient Medications on File Prior to Visit   Medication Sig Dispense Refill    albuterol (PROVENTIL/VENTOLIN HFA) 90 mcg/actuation inhaler INHALE 1 TO 2 PUFFS BY MOUTH EVERY 6 HOURS AS NEEDED FOR WHEEZING (RESCUE) 9 g 0    aspirin (ECOTRIN) 81 MG EC tablet Take 1 tablet (81 mg total) by mouth once daily. 30 tablet 6    bisoprolol (ZEBETA) 5 MG tablet Take 1 tablet (5 mg total) by mouth 2 (two) times a day. 60 tablet 11    dapagliflozin (FARXIGA) 10 mg tablet Take 1 tablet (10 mg total) by mouth once daily. 30 tablet 11    folic acid (FOLVITE) 800 MCG Tab Take 400 mcg by mouth once daily.      naproxen (NAPROSYN) 500 MG tablet Take 1 tablet by mouth 2 (two) times daily.      sacubitriL-valsartan (ENTRESTO) 49-51 mg per tablet Take 1 tablet by mouth 2 (two) times daily. 60 tablet 11    thiamine 100 MG tablet Take 250 mg by mouth once daily.      torsemide (DEMADEX) 10 MG Tab Take 1 tablet (10 mg total) by mouth once daily. 30 tablet 11    fluticasone propion-salmeterol 115-21 mcg/dose (ADVAIR HFA) 115-21 mcg/actuation HFAA inhaler Inhale 2 puffs into the lungs every 12 (twelve) hours. Controller 12 g 0     Current Facility-Administered Medications on File Prior to Visit   Medication Dose Route Frequency Provider Last Rate Last Admin    acetaminophen tablet 650 mg  650 mg Oral Once PRN Jaspreet Lerma MD        albuterol inhaler 2 puff  2 puff Inhalation Q20 Min PRN Jaspreet Lerma MD        diphenhydrAMINE injection 25 mg  25 mg Intravenous Once PRN Jaspreet Lerma MD        EPINEPHrine (EPIPEN) 0.3 mg/0.3 mL pen injection 0.3 mg  0.3 mg Intramuscular PRN Jaspreet Lerma MD        methylPREDNISolone  "sodium succinate injection 40 mg  40 mg Intravenous Once PRN Jaspreet Lerma MD        ondansetron disintegrating tablet 4 mg  4 mg Oral Once PRN Jaspreet Lerma MD        sodium chloride 0.9% 500 mL flush bag   Intravenous PRN Jaspreet Lerma MD        sodium chloride 0.9% flush 10 mL  10 mL Intravenous PRN Jaspreet Lerma MD           Review of Systems   Constitutional: Positive for malaise/fatigue.   HENT:  Negative for hearing loss and hoarse voice.    Eyes:  Negative for blurred vision and visual disturbance.   Cardiovascular:  Negative for chest pain, claudication, dyspnea on exertion, irregular heartbeat, leg swelling, near-syncope, orthopnea, palpitations, paroxysmal nocturnal dyspnea and syncope.   Respiratory:  Negative for cough, hemoptysis, shortness of breath, sleep disturbances due to breathing, snoring and wheezing.    Endocrine: Negative for cold intolerance and heat intolerance.   Hematologic/Lymphatic: Does not bruise/bleed easily.   Skin:  Negative for color change, dry skin and nail changes.   Musculoskeletal:  Positive for arthritis and back pain. Negative for joint pain and myalgias.   Gastrointestinal:  Negative for bloating, abdominal pain, constipation, nausea and vomiting.   Genitourinary:  Negative for dysuria, flank pain, hematuria and hesitancy.   Neurological:  Negative for headaches, light-headedness, loss of balance, numbness, paresthesias and weakness.   Psychiatric/Behavioral:  Negative for altered mental status.    Allergic/Immunologic: Negative for environmental allergies.       Objective:/80   Pulse 67   Resp 16   Ht 5' 3" (1.6 m)   Wt 57.2 kg (126 lb 1.7 oz)   SpO2 95%   BMI 22.34 kg/m²      Physical Exam  Vitals and nursing note reviewed.   Constitutional:       General: She is not in acute distress.     Appearance: Normal appearance. She is well-developed. She is not ill-appearing.   HENT:      Head: Normocephalic and atraumatic.      Nose: Nose normal.     "  Mouth/Throat:      Mouth: Mucous membranes are moist.   Eyes:      Pupils: Pupils are equal, round, and reactive to light.   Neck:      Thyroid: No thyromegaly.      Vascular: No JVD.      Trachea: No tracheal deviation.   Cardiovascular:      Rate and Rhythm: Normal rate and regular rhythm.      Chest Wall: PMI is not displaced.      Pulses: Intact distal pulses.           Radial pulses are 2+ on the right side and 2+ on the left side.        Dorsalis pedis pulses are 2+ on the right side and 2+ on the left side.      Heart sounds: S1 normal and S2 normal. Heart sounds not distant. No murmur heard.  Pulmonary:      Effort: Pulmonary effort is normal. No respiratory distress.      Breath sounds: Normal breath sounds. No wheezing.   Abdominal:      General: Bowel sounds are normal. There is no distension.      Palpations: Abdomen is soft.      Tenderness: There is no abdominal tenderness.   Musculoskeletal:         General: No swelling. Normal range of motion.      Cervical back: Full passive range of motion without pain, normal range of motion and neck supple.      Right ankle: No swelling.      Left ankle: No swelling.   Skin:     General: Skin is warm and dry.      Capillary Refill: Capillary refill takes less than 2 seconds.      Nails: There is no clubbing.   Neurological:      General: No focal deficit present.      Mental Status: She is alert and oriented to person, place, and time.   Psychiatric:         Speech: Speech normal.         Behavior: Behavior normal.         Thought Content: Thought content normal.         Judgment: Judgment normal.         Lab Results   Component Value Date    CHOL 153 05/26/2021     Lab Results   Component Value Date    HDL 52 05/26/2021     Lab Results   Component Value Date    LDLCALC 88.4 05/26/2021     Lab Results   Component Value Date    TRIG 63 05/26/2021     Lab Results   Component Value Date    CHOLHDL 34.0 05/26/2021       Chemistry        Component Value Date/Time      02/03/2023 0852    K 4.2 02/03/2023 0852     02/03/2023 0852    CO2 24 02/03/2023 0852    BUN 10 02/03/2023 0852    CREATININE 0.7 02/03/2023 0852    GLU 82 02/03/2023 0852        Component Value Date/Time    CALCIUM 9.9 02/03/2023 0852    ALKPHOS 91 11/30/2022 1004    AST 26 11/30/2022 1004    ALT 20 11/30/2022 1004    BILITOT 1.1 (H) 11/30/2022 1004    ESTGFRAFRICA >60.0 03/03/2022 1508    EGFRNONAA >60.0 03/03/2022 1508          Lab Results   Component Value Date    TSH 0.332 (L) 05/31/2013     Lab Results   Component Value Date    INR 1.1 05/31/2013     Lab Results   Component Value Date    WBC 3.52 (L) 09/25/2021    HGB 17.6 (H) 09/25/2021    HCT 53.5 (H) 09/25/2021    MCV 99 (H) 09/25/2021     (L) 09/25/2021        Assessment:      1. CHF (NYHA class II, ACC/AHA stage C)    2. Essential hypertension    3. Cardiomyopathy, dilated, nonischemic    4. PVC (premature ventricular contraction)    5. Tobacco abuse            Plan:       CHF SYNOPSIS:    GDMT:  ACE/ARB/ARNI: Entresto 49/51 BID   Beta Blocker: Bisoprolol 5 mg BID  MRA: on hold  SGLT2: Farxiga 10 mg daily  Diuretic: Torsemide 10 mg daily PRN    RTC In 6 months  Call if any issues prior to next visit.     Nicole May, FNP-C [facility]

## 2023-09-19 DIAGNOSIS — I50.22 CHRONIC SYSTOLIC HEART FAILURE, ACC/AHA STAGE C: ICD-10-CM

## 2023-09-19 RX ORDER — DAPAGLIFLOZIN 10 MG/1
10 TABLET, FILM COATED ORAL DAILY
Qty: 30 TABLET | Refills: 11 | Status: SHIPPED | OUTPATIENT
Start: 2023-09-19 | End: 2023-11-02

## 2023-11-01 ENCOUNTER — TELEPHONE (OUTPATIENT)
Dept: CARDIOLOGY | Facility: HOSPITAL | Age: 58
End: 2023-11-01
Payer: COMMERCIAL

## 2023-11-01 DIAGNOSIS — I50.22 CHRONIC SYSTOLIC HEART FAILURE, ACC/AHA STAGE C: ICD-10-CM

## 2023-11-01 NOTE — TELEPHONE ENCOUNTER
Contacted pt and got her scheduled for labs on 11/08/23 and confirmed her appt w/ Sara Jj NP on 02/02/24. Pt vu w/o q/c        Pt last seen 08/02/23. F/U scheduled for 02/02/24    Please see the attached refill request.

## 2023-11-02 DIAGNOSIS — I50.22 CHRONIC SYSTOLIC HEART FAILURE, ACC/AHA STAGE C: Primary | ICD-10-CM

## 2023-11-02 RX ORDER — DAPAGLIFLOZIN 10 MG/1
10 TABLET, FILM COATED ORAL
Qty: 30 TABLET | Refills: 0 | Status: SHIPPED | OUTPATIENT
Start: 2023-11-02 | End: 2023-11-28

## 2023-11-08 ENCOUNTER — LAB VISIT (OUTPATIENT)
Dept: LAB | Facility: HOSPITAL | Age: 58
End: 2023-11-08
Payer: COMMERCIAL

## 2023-11-08 DIAGNOSIS — I50.22 CHRONIC SYSTOLIC HEART FAILURE, ACC/AHA STAGE C: ICD-10-CM

## 2023-11-08 PROCEDURE — 36415 COLL VENOUS BLD VENIPUNCTURE: CPT

## 2023-11-08 PROCEDURE — 80048 BASIC METABOLIC PNL TOTAL CA: CPT

## 2023-11-09 LAB
ANION GAP SERPL CALC-SCNC: 10 MMOL/L (ref 8–16)
BUN SERPL-MCNC: 16 MG/DL (ref 6–20)
CALCIUM SERPL-MCNC: 9.5 MG/DL (ref 8.7–10.5)
CHLORIDE SERPL-SCNC: 105 MMOL/L (ref 95–110)
CO2 SERPL-SCNC: 27 MMOL/L (ref 23–29)
CREAT SERPL-MCNC: 0.8 MG/DL (ref 0.5–1.4)
EST. GFR  (NO RACE VARIABLE): >60 ML/MIN/1.73 M^2
GLUCOSE SERPL-MCNC: 94 MG/DL (ref 70–110)
POTASSIUM SERPL-SCNC: 3.8 MMOL/L (ref 3.5–5.1)
SODIUM SERPL-SCNC: 142 MMOL/L (ref 136–145)

## 2023-11-10 ENCOUNTER — TELEPHONE (OUTPATIENT)
Dept: CARDIOLOGY | Facility: CLINIC | Age: 58
End: 2023-11-10
Payer: COMMERCIAL

## 2023-11-10 NOTE — TELEPHONE ENCOUNTER
Contacted patient; Patient received and understood results with no questions or concerns.       ----- Message from JETT Mcginnis sent at 11/10/2023  1:37 PM CST -----  Please call patient    Labs reviewed  Renal function stable on current regimen  Continue same meds for now  Keep next appt as scheduled    Thanks  Sara

## 2023-11-28 DIAGNOSIS — I50.22 CHRONIC SYSTOLIC HEART FAILURE, ACC/AHA STAGE C: ICD-10-CM

## 2023-11-28 RX ORDER — DAPAGLIFLOZIN 10 MG/1
10 TABLET, FILM COATED ORAL
Qty: 30 TABLET | Refills: 6 | Status: SHIPPED | OUTPATIENT
Start: 2023-11-28

## 2023-12-19 ENCOUNTER — OFFICE VISIT (OUTPATIENT)
Dept: URGENT CARE | Facility: CLINIC | Age: 58
End: 2023-12-19
Payer: COMMERCIAL

## 2023-12-19 VITALS
DIASTOLIC BLOOD PRESSURE: 84 MMHG | RESPIRATION RATE: 18 BRPM | TEMPERATURE: 98 F | SYSTOLIC BLOOD PRESSURE: 134 MMHG | HEART RATE: 67 BPM | OXYGEN SATURATION: 95 %

## 2023-12-19 DIAGNOSIS — H61.23 BILATERAL IMPACTED CERUMEN: ICD-10-CM

## 2023-12-19 DIAGNOSIS — F17.200 SMOKER: Primary | ICD-10-CM

## 2023-12-19 DIAGNOSIS — H92.01 RIGHT EAR PAIN: ICD-10-CM

## 2023-12-19 PROCEDURE — 69209 EAR CERUMEN REMOVAL: ICD-10-PCS | Mod: 50,S$GLB,, | Performed by: NURSE PRACTITIONER

## 2023-12-19 PROCEDURE — 69209 REMOVE IMPACTED EAR WAX UNI: CPT | Mod: 50,S$GLB,, | Performed by: NURSE PRACTITIONER

## 2023-12-19 PROCEDURE — 99214 PR OFFICE/OUTPT VISIT, EST, LEVL IV, 30-39 MIN: ICD-10-PCS | Mod: 25,S$GLB,, | Performed by: NURSE PRACTITIONER

## 2023-12-19 PROCEDURE — 99214 OFFICE O/P EST MOD 30 MIN: CPT | Mod: 25,S$GLB,, | Performed by: NURSE PRACTITIONER

## 2023-12-19 RX ORDER — CIPROFLOXACIN AND DEXAMETHASONE 3; 1 MG/ML; MG/ML
4 SUSPENSION/ DROPS AURICULAR (OTIC) 2 TIMES DAILY
Qty: 7.5 ML | Refills: 0 | Status: SHIPPED | OUTPATIENT
Start: 2023-12-19

## 2023-12-19 NOTE — PATIENT INSTRUCTIONS
Today in clinic we removed a wax build up from your ears. This can be caused by using q-tips, ear buds, or hearing aid which push the wax down into your ears. In addition tracie people naturally make more wax resulting in a build up.     To remove ear wax at home you can use OTC debrox and rinse with warm ding.     Your ears have been successfully flushed today. Evaluation after the procedure does not indicate there is an infection therefore you do not require antibiotics.     You may feel mild soreness after ear wax removal. If you do take tylenol as needed for pain relief.    Return for any new or worsening symptoms.       Tobacco Cessation  Smoking, vaping, and smokeless tobacco cause harm by raising blood pressure and increasing your risk of heart attack and stroke. Chewing tobacco increases your risk of cancer of the face and throat. Smoking not only raises the risk of cancer, but more often, causes emphysema. This crippling lung disease can cause constant shortness of breath and a need to use oxygen. Stopping smoking can make the difference between playing with your grandchildren outside and sitting by with your oxygen tank watching them.     You may already know your nicotine habit is dangerous, but perhaps you feel helpless or dont know where to start.    Here at [facility] we are invested in the improvement of your health. We would be happy to help you with smoking cessation. If you are interested in quitting and answer yes to   the questions below, we can provide you with FREE assistance to get you on your way.     ? Are you a [place] resident?  ? Did you start smoking before September 1, 1988?  ? Are you ready to quit smoking?    Quitting doesnt have to be lonely -   [facility]ess[facility] program offers a supportive environment along with      FREE smoking cessation counseling to help get you through those rough patches   FREE or reduced medications* to help curb the cravings    You do not need to  be an [facility] patient to participate in the program.  Ready? Call [phone] or toll free [phone] or visit [email or web address] to sign up for the program.

## 2023-12-19 NOTE — PROCEDURES
Ear Cerumen Removal    Date/Time: 12/19/2023 2:45 PM    Performed by: Osiris Camara NP  Authorized by: Osiris Camara, NP    Consent Done?:  Yes (Verbal)    Local anesthetic:  None  Ceruminolytics applied: Ceruminolytics applied prior to the procedure    Medication Used:  Other  Location details:  Both ears  Procedure type: curette and irrigation    Cerumen  Removal Results:  Cerumen partially removed  Patient tolerance:  Patient tolerated the procedure well with no immediate complications     Visualized TM bilaterally without fluid or erythema. Right ear canal mildly erythematous.

## 2024-02-01 DIAGNOSIS — I50.9 CHF (NYHA CLASS II, ACC/AHA STAGE C): Primary | ICD-10-CM

## 2024-05-01 DIAGNOSIS — I50.22 CHRONIC SYSTOLIC HEART FAILURE, ACC/AHA STAGE C: Primary | ICD-10-CM

## 2024-05-01 RX ORDER — BISOPROLOL FUMARATE 5 MG/1
10 TABLET, FILM COATED ORAL DAILY
Qty: 60 TABLET | Refills: 11 | Status: SHIPPED | OUTPATIENT
Start: 2024-05-01 | End: 2025-05-01

## 2024-06-11 RX ORDER — SACUBITRIL AND VALSARTAN 49; 51 MG/1; MG/1
1 TABLET, FILM COATED ORAL 2 TIMES DAILY
Qty: 60 TABLET | Refills: 6 | Status: SHIPPED | OUTPATIENT
Start: 2024-06-11

## 2024-08-07 DIAGNOSIS — I50.22 CHRONIC SYSTOLIC HEART FAILURE, ACC/AHA STAGE C: ICD-10-CM

## 2024-08-07 RX ORDER — DAPAGLIFLOZIN 10 MG/1
10 TABLET, FILM COATED ORAL
Qty: 30 TABLET | Refills: 11 | Status: SHIPPED | OUTPATIENT
Start: 2024-08-07

## 2024-08-13 ENCOUNTER — OFFICE VISIT (OUTPATIENT)
Dept: CARDIOLOGY | Facility: CLINIC | Age: 59
End: 2024-08-13
Payer: COMMERCIAL

## 2024-08-13 ENCOUNTER — HOSPITAL ENCOUNTER (OUTPATIENT)
Dept: CARDIOLOGY | Facility: HOSPITAL | Age: 59
Discharge: HOME | End: 2024-08-13
Payer: COMMERCIAL

## 2024-08-13 VITALS
BODY MASS INDEX: 22.95 KG/M2 | HEART RATE: 70 BPM | HEIGHT: 63 IN | WEIGHT: 129.5 LBS | SYSTOLIC BLOOD PRESSURE: 126 MMHG | DIASTOLIC BLOOD PRESSURE: 84 MMHG | OXYGEN SATURATION: 93 %

## 2024-08-13 DIAGNOSIS — I50.9 CHF (NYHA CLASS II, ACC/AHA STAGE C): ICD-10-CM

## 2024-08-13 DIAGNOSIS — I49.3 PVC (PREMATURE VENTRICULAR CONTRACTION): ICD-10-CM

## 2024-08-13 DIAGNOSIS — R94.31 ABNORMAL ECG: ICD-10-CM

## 2024-08-13 DIAGNOSIS — I50.22 CHRONIC SYSTOLIC HEART FAILURE, ACC/AHA STAGE C: Primary | ICD-10-CM

## 2024-08-13 DIAGNOSIS — I42.0 CARDIOMYOPATHY, DILATED, NONISCHEMIC: ICD-10-CM

## 2024-08-13 LAB
OHS QRS DURATION: 100 MS
OHS QTC CALCULATION: 460 MS

## 2024-08-13 PROCEDURE — 3074F SYST BP LT 130 MM HG: CPT | Mod: CPTII,S$GLB,, | Performed by: NURSE PRACTITIONER

## 2024-08-13 PROCEDURE — 99214 OFFICE O/P EST MOD 30 MIN: CPT | Mod: S$GLB,,, | Performed by: NURSE PRACTITIONER

## 2024-08-13 PROCEDURE — 93005 ELECTROCARDIOGRAM TRACING: CPT

## 2024-08-13 PROCEDURE — 3008F BODY MASS INDEX DOCD: CPT | Mod: CPTII,S$GLB,, | Performed by: NURSE PRACTITIONER

## 2024-08-13 PROCEDURE — 1159F MED LIST DOCD IN RCRD: CPT | Mod: CPTII,S$GLB,, | Performed by: NURSE PRACTITIONER

## 2024-08-13 PROCEDURE — 4010F ACE/ARB THERAPY RXD/TAKEN: CPT | Mod: CPTII,S$GLB,, | Performed by: NURSE PRACTITIONER

## 2024-08-13 PROCEDURE — 99999 PR PBB SHADOW E&M-EST. PATIENT-LVL IV: CPT | Mod: PBBFAC,,, | Performed by: NURSE PRACTITIONER

## 2024-08-13 PROCEDURE — 93010 ELECTROCARDIOGRAM REPORT: CPT | Mod: ,,, | Performed by: INTERNAL MEDICINE

## 2024-08-13 PROCEDURE — 3079F DIAST BP 80-89 MM HG: CPT | Mod: CPTII,S$GLB,, | Performed by: NURSE PRACTITIONER

## 2024-08-13 NOTE — PROGRESS NOTES
Subjective:   Patient ID:  Melissa Petty is a 59 y.o. female who presents for evaluation of No chief complaint on file.        HPI    Melissa Petty is a 57 year old female who presents to [facility] for 6 month follow up. Her current medical conditions include DCM, HFmrEF of 45%, PVCs, COPD,h/o covid,(9/2021) smoker. Shee returns today and states she is doing ok.       She has been using her ibuprofen more regularly due to right knee pain.   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     Has been having to use her lasix regularly due to worsening shortness of breath episodes.       Denies chest pain or anginal equivalents. Reports regular walking without any issues lately. NO leg swelling or claudications. No recent falls, syncope or near syncopal events. Reports compliance with medications and dietary restrictions. NO CNS complaints to suggest TIA or CVA today. No signs of abnormal bleeding.     Still smoking 1 ppd.       12/15/2022 update    Melissa Petty returns for 2 week follow up.     She reports significant improvement in CHF symptoms since med adjustments after last visit. Wearing LifeVest since last visit without any firing.     Still continues to smoke but has cut back since last visit.     Last 2 days, she has had noted improvement in shortness of breath. Denies orthopnea, PND or abdominal bloating. Has lost 6 pounds since last visit.     BP had dropped last week and ACEi has been on hold since that time. BP improved with med adjustments. No leg swelling on exam today.     Long discussion regarding CHF treatment plan. CHF educational booklet provided to patient today.     [place] paperwork completed today, tentative date on return to work will be late March or early April.     1/6/2023 update    Melissa Petty returns for follow up.     She is doing well with med adjustments at last visit.     Weight is stable today. Still has some shortness of breath with exertion but is  much improved from initial visit with me.     BP is much improved since med adjustments at last visit.     Tolerating Bisoprolol 5 mg daily without any cardiac side effects.     No chest pain or anginal equivalents.   Today, she is NYHA Stage C FC II.     Will add Entresto 24/26 nightly starting today. Denies orthopnea, PND or abdominal bloating. No leg swelling on exam today.     1/20/2023 update    Melissa Petty returns for 2 week CHF follow up. She continues to improve with each visit and GDMT titration. Tolerating Entresto without any side effects. BP well controlled. Has not used any PRN torsemide since last visit. Wearing LifeVest without any firing.     Has not returned to work on FMLA leave at this time.     NO chest pain or anginal equivalents. Doing well with limiting salt and fluid intake recently. Weight is stable today in office. BP much improved today.     Still has some degree of shortness of breath when she exerts herself. But able to complete regular tasks without any shortness of breath or fatigue. Denies any bendopnea symptoms. No leg swelling on exam today.     2/3/2023 update    She returns today and states she is doing well.     Denies any CHF symptoms since last visit. Has not used her Torsemide in the past 2 weeks. BP well controlled today.     No chest pain or anginal equivalents. No leg swelling on exam today.     Weight is stable today. Labs pending.    No shortness of breath, PAINTER or palpitations. Denies orthopnea, PND or abdominal bloating.     Doing well with salt and fluid restrictions since last visit.   Tolerating medications without any side effects offered today in clinic.     3/9/2023 update    Melissa Petty returns for CHF follow up and ECHO to update LVEF with GDMT.     ECHO pending.     Has been doing well CHF wise. NO chest pain or anginal equivalents. Denies shortness of breath, PAINTER or palpitations. NO leg swelling on exam today.     Has been doing well with salt  restrictions.     Tolerating medications without any issues today.     Await echo results to decide next steps.     4/24/2023 update    Melissa Petty returns for 1 month CHF follow up. She has returned to work 5 days a week without any cardiac complaints. Denies any shortness of breath, PAINTER or palpitations. NO chest pain or anginal equivalents.     Overall, she feels great today in office. Has not used any Torsemide in quite some time. NO leg swelling on exam.     Reports compliance with medications and dietary restrictions.     No dizziness, LH, Syncope or near syncopal events.     8/2/2023 update    Melissa Petty returns for 3 month follow up.     She is doing well cardiac wise. Has not required any doses of Torsemide in the past 3 months. BP well controlled today.     She continues to work in the [employer] at [employer] which is physically taxing. NO shortness of breath, PAINTER or palpitations with exertional activities.     Reports compliance with medications and dietary restrictions.   Still smoking daily.     Main complaint is heat intolerance in excessive temperatures.   No leg swelling. Denies chest pain or anginal equivalents.     8/13/2024 update    Melissa Petty returns for annual follow up.     Doing well with medications. Reports compliance with sodium and fluid restrictions.     Denies chest pain or anginal equivalents. No shortness of breath, PAINTER or palpitations. Denies orthopnea, PND or abdominal bloating. Reports regular walking without any issues lately. NO leg swelling or claudications. No recent falls, syncope or near syncopal events. Reports compliance with medications and dietary restrictions. NO CNS complaints to suggest TIA or CVA today. No signs of abnormal bleeding on ASA daily.     Past Medical History:   Diagnosis Date    Cardiomyopathy     CHF (congestive heart failure)     COPD exacerbation 5/26/2020    Hypertension        Past Surgical History:   Procedure Laterality Date     JOINT REPLACEMENT         Social History     Tobacco Use    Smoking status: Every Day     Current packs/day: 1.00     Average packs/day: 1 pack/day for 32.0 years (32.0 ttl pk-yrs)     Types: Cigarettes    Smokeless tobacco: Never   Substance Use Topics    Alcohol use: Yes     Comment: Occassionally    Drug use: No       Family History   Problem Relation Name Age of Onset    Heart failure Mother      Hypertension Brother       Wt Readings from Last 3 Encounters:   08/13/24 58.7 kg (129 lb 8.3 oz)   08/02/23 57.2 kg (126 lb 1.7 oz)   04/24/23 56.5 kg (124 lb 9 oz)     Temp Readings from Last 3 Encounters:   12/19/23 98.2 °F (36.8 °C) (Oral)   09/27/21 97.7 °F (36.5 °C)   09/25/21 98.5 °F (36.9 °C) (Oral)     BP Readings from Last 3 Encounters:   08/13/24 126/84   12/19/23 134/84   08/02/23 124/80     Pulse Readings from Last 3 Encounters:   08/13/24 70   12/19/23 67   08/02/23 67     Current Outpatient Medications on File Prior to Visit   Medication Sig Dispense Refill    aspirin (ECOTRIN) 81 MG EC tablet Take 1 tablet (81 mg total) by mouth once daily. 30 tablet 6    bisoprolol (ZEBETA) 5 MG tablet Take 2 tablets (10 mg total) by mouth once daily. 60 tablet 11    FARXIGA 10 mg tablet Take 1 tablet by mouth once daily 30 tablet 11    folic acid (FOLVITE) 800 MCG Tab Take 400 mcg by mouth once daily.      sacubitriL-valsartan (ENTRESTO) 49-51 mg per tablet Take 1 tablet by mouth twice daily 60 tablet 6    torsemide (DEMADEX) 10 MG Tab Take 1 tablet (10 mg total) by mouth once daily. 30 tablet 11    albuterol (PROVENTIL/VENTOLIN HFA) 90 mcg/actuation inhaler INHALE 1 TO 2 PUFFS BY MOUTH EVERY 6 HOURS AS NEEDED FOR WHEEZING (RESCUE) (Patient not taking: Reported on 8/13/2024) 9 g 0    ciprofloxacin-dexAMETHasone 0.3-0.1% (CIPRODEX) 0.3-0.1 % DrpS Place 4 drops into both ears 2 (two) times daily. (Patient not taking: Reported on 8/13/2024) 7.5 mL 0    fluticasone propion-salmeterol 115-21 mcg/dose (ADVAIR HFA) 115-21  mcg/actuation HFAA inhaler Inhale 2 puffs into the lungs every 12 (twelve) hours. Controller 12 g 0    naproxen (NAPROSYN) 500 MG tablet Take 1 tablet by mouth 2 (two) times daily. (Patient not taking: Reported on 8/13/2024)      thiamine 100 MG tablet Take 250 mg by mouth once daily. (Patient not taking: Reported on 8/13/2024)       Current Facility-Administered Medications on File Prior to Visit   Medication Dose Route Frequency Provider Last Rate Last Admin    acetaminophen tablet 650 mg  650 mg Oral Once PRN Jaspreet Lerma MD        albuterol inhaler 2 puff  2 puff Inhalation Q20 Min PRN Jaspreet Lerma MD        diphenhydrAMINE injection 25 mg  25 mg Intravenous Once PRN aJspreet Lerma MD        EPINEPHrine (EPIPEN) 0.3 mg/0.3 mL pen injection 0.3 mg  0.3 mg Intramuscular PRN Jaspreet Lerma MD        methylPREDNISolone sodium succinate injection 40 mg  40 mg Intravenous Once PRJaspreet Schultz MD        ondansetron disintegrating tablet 4 mg  4 mg Oral Once PRN Jaspreet Lerma MD        sodium chloride 0.9% 500 mL flush bag   Intravenous PRJaspreet Schultz MD        sodium chloride 0.9% flush 10 mL  10 mL Intravenous PRN Jaspreet Lerma MD           Review of Systems   Constitutional: Positive for malaise/fatigue.   HENT:  Negative for hearing loss and hoarse voice.    Eyes:  Negative for blurred vision and visual disturbance.   Cardiovascular:  Negative for chest pain, claudication, dyspnea on exertion, irregular heartbeat, leg swelling, near-syncope, orthopnea, palpitations, paroxysmal nocturnal dyspnea and syncope.   Respiratory:  Negative for cough, hemoptysis, shortness of breath, sleep disturbances due to breathing, snoring and wheezing.    Endocrine: Negative for cold intolerance and heat intolerance.   Hematologic/Lymphatic: Does not bruise/bleed easily.   Skin:  Negative for color change, dry skin and nail changes.   Musculoskeletal:  Positive for arthritis and back pain.  "Negative for joint pain and myalgias.   Gastrointestinal:  Negative for bloating, abdominal pain, constipation, nausea and vomiting.   Genitourinary:  Negative for dysuria, flank pain, hematuria and hesitancy.   Neurological:  Negative for headaches, light-headedness, loss of balance, numbness, paresthesias and weakness.   Psychiatric/Behavioral:  Negative for altered mental status.    Allergic/Immunologic: Negative for environmental allergies.       Objective:/84 (BP Location: Right arm, Patient Position: Sitting, BP Method: Medium (Manual))   Pulse 70   Ht 5' 3" (1.6 m)   Wt 58.7 kg (129 lb 8.3 oz)   SpO2 (!) 93%   BMI 22.94 kg/m²      Physical Exam  Vitals and nursing note reviewed.   Constitutional:       General: She is not in acute distress.     Appearance: Normal appearance. She is well-developed. She is not ill-appearing.   HENT:      Head: Normocephalic and atraumatic.      Nose: Nose normal.      Mouth/Throat:      Mouth: Mucous membranes are moist.   Eyes:      Pupils: Pupils are equal, round, and reactive to light.   Neck:      Thyroid: No thyromegaly.      Vascular: No JVD.      Trachea: No tracheal deviation.   Cardiovascular:      Rate and Rhythm: Normal rate and regular rhythm.      Chest Wall: PMI is not displaced.      Pulses: Intact distal pulses.           Radial pulses are 2+ on the right side and 2+ on the left side.        Dorsalis pedis pulses are 2+ on the right side and 2+ on the left side.      Heart sounds: S1 normal and S2 normal. Heart sounds not distant. No murmur heard.  Pulmonary:      Effort: Pulmonary effort is normal. No respiratory distress.      Breath sounds: Normal breath sounds. No wheezing.   Abdominal:      General: Bowel sounds are normal. There is no distension.      Palpations: Abdomen is soft.      Tenderness: There is no abdominal tenderness.   Musculoskeletal:         General: No swelling. Normal range of motion.      Cervical back: Full passive range of " motion without pain, normal range of motion and neck supple.      Right ankle: No swelling.      Left ankle: No swelling.   Skin:     General: Skin is warm and dry.      Capillary Refill: Capillary refill takes less than 2 seconds.      Nails: There is no clubbing.   Neurological:      General: No focal deficit present.      Mental Status: She is alert and oriented to person, place, and time.   Psychiatric:         Speech: Speech normal.         Behavior: Behavior normal.         Thought Content: Thought content normal.         Judgment: Judgment normal.         Lab Results   Component Value Date    CHOL 153 05/26/2021     Lab Results   Component Value Date    HDL 52 05/26/2021     Lab Results   Component Value Date    LDLCALC 88.4 05/26/2021     Lab Results   Component Value Date    TRIG 63 05/26/2021     Lab Results   Component Value Date    CHOLHDL 34.0 05/26/2021       Chemistry        Component Value Date/Time     11/08/2023 1430    K 3.8 11/08/2023 1430     11/08/2023 1430    CO2 27 11/08/2023 1430    BUN 16 11/08/2023 1430    CREATININE 0.8 11/08/2023 1430    GLU 94 11/08/2023 1430        Component Value Date/Time    CALCIUM 9.5 11/08/2023 1430    ALKPHOS 91 11/30/2022 1004    AST 26 11/30/2022 1004    ALT 20 11/30/2022 1004    BILITOT 1.1 (H) 11/30/2022 1004    ESTGFRAFRICA >60.0 03/03/2022 1508    EGFRNONAA >60.0 03/03/2022 1508          Lab Results   Component Value Date    TSH 0.332 (L) 05/31/2013     Lab Results   Component Value Date    INR 1.1 05/31/2013     Lab Results   Component Value Date    WBC 3.52 (L) 09/25/2021    HGB 17.6 (H) 09/25/2021    HCT 53.5 (H) 09/25/2021    MCV 99 (H) 09/25/2021     (L) 09/25/2021        Assessment:      1. Chronic systolic heart failure, ACC/AHA stage C    2. Abnormal ECG    3. Cardiomyopathy, dilated, nonischemic    4. PVC (premature ventricular contraction)              Plan:       CHF SYNOPSIS:    GDMT:  ACE/ARB/ARNI: Entresto 49/51 BID   Beta  Blocker: Bisoprolol 5 mg BID  MRA: on hold  SGLT2: Farxiga 10 mg daily  Diuretic: Torsemide 10 mg daily PRN    RTC In 1 year  Call if any issues prior to next visit.     Nicole May, FNP-C [facility]

## 2024-11-19 NOTE — PROGRESS NOTES
Subjective:    Patient ID:  Melissa Petty is a 54 y.o. female who presents for follow-up of CHF      HPI   Ms. Petty is a 54 year old female patient whose current medical conditions include chronic systolic CHF/dilated NICM, tobacco abuse, and abnormal EKG who presents today for follow-up. Patient last seen in clinic by Dr. Hopkins in 2017. She returns today and states she is doing well. No cardiac complaints. Denies any chest pain, SOB, or other anginal signs or symptoms. No PND, orthopnea, abdominal bloating. Occasional ankle edema, worse at the end of the day. No lightheadedness, dizziness, palpitations, near syncope, or syncope. Fairly active, works at [employer] and is on her feet a lot. Improvement in mobility s/p her hip replacement. BP initially elevated today but patient was rushed to make it to appt, repeat within normal limits. She states she is compliant with her medications but has only been taking Coreg once daily. Needs repeat labs and 2D echo. Still smoking 1 ppd. 2D echo 9/17 showed normal EF. EKG today shows SR with frequent PVC's, LVH, ST and T wave abnormality, consider lateral ischemia, prolonged QT.     Review of Systems   Constitution: Negative for chills, decreased appetite, fever and malaise/fatigue.   HENT: Negative for congestion, hoarse voice and sore throat.    Eyes: Negative for blurred vision and discharge.   Cardiovascular: Negative for chest pain, claudication, cyanosis, dyspnea on exertion, irregular heartbeat, leg swelling, near-syncope, orthopnea, palpitations and paroxysmal nocturnal dyspnea.   Respiratory: Negative for cough, hemoptysis, shortness of breath, snoring, sputum production and wheezing.    Endocrine: Negative for cold intolerance and heat intolerance.   Hematologic/Lymphatic: Negative for bleeding problem. Does not bruise/bleed easily.   Skin: Negative for rash.   Musculoskeletal: Positive for arthritis and joint pain. Negative for back pain, joint swelling,  "muscle cramps, muscle weakness and myalgias.   Gastrointestinal: Negative for abdominal pain, constipation, diarrhea, heartburn, melena and nausea.   Genitourinary: Negative for hematuria.   Neurological: Negative for dizziness, focal weakness, headaches, light-headedness, loss of balance, numbness, paresthesias, seizures and weakness.   Psychiatric/Behavioral: Negative for memory loss. The patient does not have insomnia.    Allergic/Immunologic: Negative for hives.     /82   Pulse 96   Resp 16   Ht 5' 3" (1.6 m)   Wt 63.4 kg (139 lb 12.4 oz)   BMI 24.76 kg/m²     Objective:    Physical Exam   Constitutional: She is oriented to person, place, and time. She appears well-developed and well-nourished. No distress.   HENT:   Head: Normocephalic and atraumatic.   Eyes: Pupils are equal, round, and reactive to light. Right eye exhibits no discharge. Left eye exhibits no discharge.   Neck: Neck supple. No JVD present.   Cardiovascular: Normal rate, regular rhythm, S1 normal, S2 normal and normal heart sounds. Frequent extrasystoles are present.   No murmur heard.  Pulmonary/Chest: Effort normal and breath sounds normal. No respiratory distress. She has no wheezes. She has no rales.   Abdominal: Soft. She exhibits no distension.   Musculoskeletal: She exhibits edema (bilateral ankle (mild)).   Neurological: She is alert and oriented to person, place, and time.   Skin: Skin is warm and dry. She is not diaphoretic. No erythema.   Psychiatric: She has a normal mood and affect. Her behavior is normal. Thought content normal.   Nursing note and vitals reviewed.    2D echo CONCLUSIONS     1 - Concentric hypertrophy.     2 - No wall motion abnormalities.     3 - Normal left ventricular systolic function (EF 60-65%).     4 - Normal left ventricular diastolic function.     5 - Normal right ventricular systolic function .     6 - The estimated PA systolic pressure is greater than 25 mmHg.     No results found for: CHOL  No " results found for: HDL  No results found for: LDLCALC  No results found for: TRIG  No results found for: CHOLHDL    Chemistry        Component Value Date/Time     09/02/2015 1253    K 3.9 09/02/2015 1253     09/02/2015 1253    CO2 26 09/02/2015 1253    BUN 17 09/02/2015 1253    CREATININE 0.7 09/02/2015 1253    GLU 81 09/02/2015 1253        Component Value Date/Time    CALCIUM 9.5 09/02/2015 1253    ALKPHOS 80 06/24/2013 1503    AST 25 06/24/2013 1503    ALT 26 06/24/2013 1503    BILITOT 0.5 06/24/2013 1503    ESTGFRAFRICA >60.0 09/02/2015 1253    EGFRNONAA >60.0 09/02/2015 1253          Assessment:       1. Frequent PVCs    2. Chronic systolic heart failure    3. Cardiomyopathy, dilated, nonischemic    4. Smoker    5. Nonspecific abnormal electrocardiogram (ECG) (EKG)      Patient presents for f/u. Last seen in 2017. Stable, no complaints but EKG with frequent PVC's and non-specific T wave abnormality laterally. Check CMP, Mg level today. Advised patient to take Coreg 12.5 mg BID for now, may need to increase to 25 mg BID. Check echo and MPI stress test next week. Follow-up in 3 weeks with repeat EKG. Consider Holter monitor.   Plan:   -Continue current medical management and risk factor modification, take Coreg 12.5 mg BID (patient instructed to cut pills)  -Cardiac, low salt diet  -Continue active lifestyle  -Smoking cessation advised  -CMP, Mg level today, phone review  -2D echo, MPI stress test next week   -RTC with PA in 3-4 weeks with repeat EKG  -Will consider Holter monitor if no improvement in PVC's s/p BB dosage adjustment       Chart reviewed. Dr. Koenig agrees with plan as outlined above.            [Follow-Up] : a follow-up evaluation of

## 2025-02-20 RX ORDER — SACUBITRIL AND VALSARTAN 49; 51 MG/1; MG/1
1 TABLET, FILM COATED ORAL 2 TIMES DAILY
Qty: 60 TABLET | Refills: 11 | Status: SHIPPED | OUTPATIENT
Start: 2025-02-20

## 2025-04-15 DIAGNOSIS — I50.22 CHRONIC SYSTOLIC HEART FAILURE, ACC/AHA STAGE C: ICD-10-CM

## 2025-04-15 RX ORDER — BISOPROLOL FUMARATE 5 MG/1
10 TABLET, FILM COATED ORAL
Qty: 60 TABLET | Refills: 6 | Status: SHIPPED | OUTPATIENT
Start: 2025-04-15

## 2025-07-30 ENCOUNTER — TELEPHONE (OUTPATIENT)
Dept: CARDIOLOGY | Facility: CLINIC | Age: 60
End: 2025-07-30
Payer: COMMERCIAL

## 2025-07-30 DIAGNOSIS — I10 ESSENTIAL HYPERTENSION: Primary | ICD-10-CM

## 2025-08-13 ENCOUNTER — HOSPITAL ENCOUNTER (OUTPATIENT)
Dept: CARDIOLOGY | Facility: HOSPITAL | Age: 60
Discharge: HOME | End: 2025-08-13
Payer: COMMERCIAL

## 2025-08-13 ENCOUNTER — OFFICE VISIT (OUTPATIENT)
Dept: CARDIOLOGY | Facility: CLINIC | Age: 60
End: 2025-08-13
Payer: COMMERCIAL

## 2025-08-13 VITALS
HEART RATE: 75 BPM | DIASTOLIC BLOOD PRESSURE: 70 MMHG | SYSTOLIC BLOOD PRESSURE: 130 MMHG | BODY MASS INDEX: 22.93 KG/M2 | WEIGHT: 129.44 LBS | OXYGEN SATURATION: 96 % | HEIGHT: 63 IN

## 2025-08-13 DIAGNOSIS — I50.22 CHRONIC SYSTOLIC HEART FAILURE, ACC/AHA STAGE C: ICD-10-CM

## 2025-08-13 DIAGNOSIS — I49.3 PVC (PREMATURE VENTRICULAR CONTRACTION): ICD-10-CM

## 2025-08-13 DIAGNOSIS — I10 ESSENTIAL HYPERTENSION: ICD-10-CM

## 2025-08-13 DIAGNOSIS — I42.0 CARDIOMYOPATHY, DILATED, NONISCHEMIC: ICD-10-CM

## 2025-08-13 DIAGNOSIS — Z72.0 TOBACCO ABUSE: ICD-10-CM

## 2025-08-13 DIAGNOSIS — I50.9 CHF (NYHA CLASS II, ACC/AHA STAGE C): Primary | ICD-10-CM

## 2025-08-13 LAB
OHS QRS DURATION: 98 MS
OHS QTC CALCULATION: 439 MS

## 2025-08-13 PROCEDURE — 99214 OFFICE O/P EST MOD 30 MIN: CPT | Mod: S$GLB,,, | Performed by: NURSE PRACTITIONER

## 2025-08-13 PROCEDURE — 4010F ACE/ARB THERAPY RXD/TAKEN: CPT | Mod: CPTII,S$GLB,, | Performed by: NURSE PRACTITIONER

## 2025-08-13 PROCEDURE — 93010 ELECTROCARDIOGRAM REPORT: CPT | Mod: ,,, | Performed by: INTERNAL MEDICINE

## 2025-08-13 PROCEDURE — 3008F BODY MASS INDEX DOCD: CPT | Mod: CPTII,S$GLB,, | Performed by: NURSE PRACTITIONER

## 2025-08-13 PROCEDURE — 3078F DIAST BP <80 MM HG: CPT | Mod: CPTII,S$GLB,, | Performed by: NURSE PRACTITIONER

## 2025-08-13 PROCEDURE — 1159F MED LIST DOCD IN RCRD: CPT | Mod: CPTII,S$GLB,, | Performed by: NURSE PRACTITIONER

## 2025-08-13 PROCEDURE — 99999 PR PBB SHADOW E&M-EST. PATIENT-LVL IV: CPT | Mod: PBBFAC,,, | Performed by: NURSE PRACTITIONER

## 2025-08-13 PROCEDURE — 93005 ELECTROCARDIOGRAM TRACING: CPT

## 2025-08-13 PROCEDURE — 3075F SYST BP GE 130 - 139MM HG: CPT | Mod: CPTII,S$GLB,, | Performed by: NURSE PRACTITIONER

## 2025-08-13 RX ORDER — TORSEMIDE 10 MG/1
10 TABLET ORAL DAILY
Qty: 30 TABLET | Refills: 11 | Status: SHIPPED | OUTPATIENT
Start: 2025-08-13 | End: 2026-08-13

## 2025-08-13 RX ORDER — DAPAGLIFLOZIN 10 MG/1
10 TABLET, FILM COATED ORAL DAILY
Qty: 30 TABLET | Refills: 11 | Status: SHIPPED | OUTPATIENT
Start: 2025-08-13